# Patient Record
Sex: MALE | Race: BLACK OR AFRICAN AMERICAN | Employment: UNEMPLOYED | URBAN - METROPOLITAN AREA
[De-identification: names, ages, dates, MRNs, and addresses within clinical notes are randomized per-mention and may not be internally consistent; named-entity substitution may affect disease eponyms.]

---

## 2017-01-26 ENCOUNTER — OFFICE VISIT (OUTPATIENT)
Dept: RHEUMATOLOGY | Age: 52
End: 2017-01-26

## 2017-01-26 VITALS
OXYGEN SATURATION: 96 % | SYSTOLIC BLOOD PRESSURE: 146 MMHG | HEIGHT: 67 IN | BODY MASS INDEX: 42.72 KG/M2 | WEIGHT: 272.2 LBS | DIASTOLIC BLOOD PRESSURE: 96 MMHG | TEMPERATURE: 98 F | HEART RATE: 80 BPM

## 2017-01-26 DIAGNOSIS — M05.742 RHEUMATOID ARTHRITIS INVOLVING BOTH HANDS WITH POSITIVE RHEUMATOID FACTOR (HCC): Primary | ICD-10-CM

## 2017-01-26 DIAGNOSIS — M19.90 OSTEOARTH NOS-UNSPEC: ICD-10-CM

## 2017-01-26 DIAGNOSIS — M05.741 RHEUMATOID ARTHRITIS INVOLVING BOTH HANDS WITH POSITIVE RHEUMATOID FACTOR (HCC): Primary | ICD-10-CM

## 2017-01-26 RX ORDER — FOLIC ACID 1 MG/1
1 TABLET ORAL DAILY
Qty: 30 TAB | Refills: 11 | Status: SHIPPED | OUTPATIENT
Start: 2017-01-26 | End: 2017-02-25

## 2017-01-26 RX ORDER — METHOTREXATE 2.5 MG/1
15 TABLET ORAL
Qty: 24 TAB | Refills: 6 | Status: SHIPPED | OUTPATIENT
Start: 2017-01-29 | End: 2017-02-28

## 2017-01-26 NOTE — PROGRESS NOTES
RHEUMATOLOGY PROBLEM LIST AND CHIEF COMPLAINT  1. Rheumatoid Arthritis (2015) - Joint pain/swelling, synovitis in hands, wrists, elbows, rheumatoid nodules on elbows, morning stiffness. Elevated ESR, CCP, RF, CRP. Therapy History:  Prior NSAIDs:   Prior DMARDs:  Current NSAIDs:  Current DMARDs: MTX (Past-09/2016, 10/2016-current). Enbrel (11/2015-current)    2. Shoulder OA and rotator cuff disease    INTERVAL HISTORY  This is a 54 y.o.  male. Today, the patient complains of pain in the joints - left shoulder and hands  Severity:  4 on a scale of 0-10. Timing: all day   Context/Associated signs and symptoms: The patient states that his RA is doing well, but he complains of some lower back pain. He also complains that his hands shake when grasping items like a glass of water. He notes that the nodules on his left elbow persist. He also states that his right knee has been sore on occasion. The patient was given a steroid injection in his left shoulder last visit and states that his shoulder pain fully resolved about 1 week after his steroid injection. He continues on methotrexate 20 mg weekly and Enbrel weekly with no adverse effects. He is no longer on prednisone. RHEUMATOLOGY REVIEW OF SYSTEMS   Positives as per history  Negatives as follows:  Princess Spell:  Denies unexplained persistent fevers or weight change  RESPIRATORY:  No pleuritic pain, exertional dyspnea  CARDIOVASCULAR:  Denies chest pain  GASTRO:   Denies heartburn, abdominal pain, nausea, vomiting, diarrhea  SKIN:    Denies rash   MSK:    No morning stiffness >1 hour, persistent joint swelling, persistent joint pain    PAST MEDICAL HISTORY  Reviewed with patient, significant changes in medical history - no    PHYSICAL EXAM  Blood pressure (!) 146/96, pulse 80, temperature 98 °F (36.7 °C), temperature source Oral, height 5' 7\" (1.702 m), weight 272 lb 3.2 oz (123.5 kg), SpO2 96 %.   GENERAL APPEARANCE: Well-nourished, no acute distress  NECK: No adenopathy  ENT: No oral ulcers  CARDIOVASCULAR: Heart rhythm is regular. No murmur, rub, gallop  CHEST: Normal vesicular breath sounds. No wheezes, rales, pleural friction rubs  ABDOMINAL: The abdomen is soft and nontender. Bowel sounds are normal  SKIN: No rash, palpable purpura, digital ulcer, abnormal thickening   MUSCULOSKELETAL: Rotator cuff signs on the left. Upper extremities -  2 Nodules noted over left elbow  Lower extremities - full range of motion, no tenderness, no swelling, no synovial thickening and no deformity of joints     LABS, RADIOLOGY AND PROCEDURES   Previous labs reviewed -Yes    ASSESSMENT  1. Rheumatoid Arthritis (Established problem -  Very good partial response) - The patient restarted methotrexate and continues on Enbrel weekly. He is doing well and most of his pain today is from osteoarthritis. The nodules over his left elbow have persisted. I will reduce his methotrexate to 15 mg weekly, as methotrexate could be causing this. He should continue on Enbrel weekly along with his methotrexate and return in 4 months for a follow up. 2. Drug therapy monitoring for toxicity (methotrexate/Enbrel) - CBC, BUN, Cr, AST, ALT and albumin every 4 months  3. Osteoarthritis - The patient's back is causing him pain today. I have recommended seeing a back specialist and referred him to physical therapy. His left shoulder improved with his previous steroid injection. PLAN  1. Continue Enbrel  2. Decrease Methotrexate to 15 mg weekly  3. Check CBC, CMP, markers of inflammation (ESR, CRP)  4. Referral to physical therapy  5. Return in 4 months    Refugio Garcia MD, [address] [place] [address]   Adult and Pediatric Rheumatology     [address] [place] [address], Phone [phone], Fax [phone]     Visiting  of Pediatrics    Department of Pediatrics, [facility] [address] [address], [place], [address] [place] [address], Phone [phone], Fax [phone]    cc: Sangeeta Bray MD    Written by jeffery Rodas, as dictated by Rodrigo William.  Huang Funk M.D.

## 2017-01-26 NOTE — MR AVS SNAPSHOT
Visit Information Date & Time Provider Department Dept. Phone Encounter #  
 1/26/2017  1:20 PM Jj Whitfield MD [facility] [id number] Follow-up Instructions Return in about 4 months (around 5/26/2017). Your Appointments 2/16/2017  3:00 PM  
ROUTINE CARE with Sangeeta Bray MD  
DEPARTMENT [facility] OFFICE-[facility] ([address]) Appt Note: 6 [facility]  
 [address] [facility] [phone]  
[phone] [address] [facility] [address] Upcoming Health Maintenance Date Due INFLUENZA AGE 9 TO ADULT 8/1/2016 DTaP/Tdap/Td series (2 - Td) 6/1/2025 Allergies as of 1/26/2017  Review Complete On: 1/26/2017 By: Yessi Aquino LPN No Known Allergies Current Immunizations  Reviewed on 6/1/2015 Name Date Tdap 6/1/2015 Not reviewed this visit You Were Diagnosed With   
  
 Codes Comments Rheumatoid arthritis involving both hands with positive rheumatoid factor ([facility]    -  Primary ICD-10-CM: M05.741, W17.604 ICD-9-CM: 714.0 Osteoarth NOS-unspec     ICD-10-CM: M19.90 ICD-9-CM: 715.90 Vitals BP Pulse Temp Height(growth percentile) Weight(growth percentile) SpO2  
 (!) 146/96 (BP 1 Location: Left arm, BP Patient Position: Sitting) 80 98 °F (36.7 °C) (Oral) 5' 7\" (1.702 m) 272 lb 3.2 oz (123.5 kg) 96% BMI Smoking Status 42.63 kg/m2 Current Every Day Smoker Vitals History BMI and BSA Data Body Mass Index Body Surface Area  
 42.63 kg/m 2 2.42 m 2 Preferred Pharmacy Pharmacy Name Phone [facility] [address]. [phone] Your Updated Medication List  
  
   
This list is accurate as of: 1/26/17  1:48 PM.  Always use your most recent med list.  
  
  
  
  
 aspirin delayed-release 81 mg tablet Take 1 Tab by mouth daily. atenolol 25 mg tablet Commonly known as:  TENORMIN Take 0.5 Tabs by mouth nightly. * chlorthalidone 25 mg tablet Commonly known as:  HYGROTEN  
TAKE ONE TABLET BY MOUTH DAILY  Indications: HYPERTENSION * chlorthalidone 25 mg tablet Commonly known as:  HYGROTEN  
TAKE ONE TABLET BY MOUTH DAILY  
  
 etanercept 50 mg/mL (0.98 mL) injection Commonly known as:  ENBREL 50 mg by SubCUTAneous route every seven (7) days. folic acid 1 mg tablet Commonly known as:  Google Take 1 Tab by mouth daily for 30 days. methotrexate 2.5 mg tablet Commonly known as:  Denise Knee Take 6 Tabs by mouth every Sunday for 30 days. Start taking on:  1/29/2017  
  
 nystatin topical cream  
Commonly known as:  MYCOSTATIN Apply  to affected area two (2) times a day. * Notice: This list has 2 medication(s) that are the same as other medications prescribed for you. Read the directions carefully, and ask your doctor or other care provider to review them with you. Prescriptions Sent to Pharmacy Refills  
 methotrexate (RHEUMATREX) 2.5 mg tablet 6 Starting on: 1/29/2017 Sig: Take 6 Tabs by mouth every Sunday for 30 days. Class: Normal  
 Pharmacy: [address] Ph #: [phone] Route: Oral  
 folic acid (FOLVITE) 1 mg tablet 11 Sig: Take 1 Tab by mouth daily for 30 days. Class: Normal  
 Pharmacy: [address] Ph #: [phone] Route: Oral  
  
We Performed the Following C REACTIVE PROTEIN, QT [00699 CPT(R)] CBC+PLATELET+HEM REVIEW [94213 CPT(R)] METABOLIC PANEL, COMPREHENSIVE [59716 CPT(R)] REFERRAL TO PHYSICAL THERAPY [QUS64 Custom] Comments:  
 Osteoarthritis SED RATE (ESR) [id number] CPT(R)] Follow-up Instructions Return in about 4 months (around 5/26/2017). Referral Information Referral ID Referred By Referred To [id number] Zunilda GARCIA [facility], [address] [place] [address] Phone: [phone] Fax: [phone] Visits Status Start Date End Date 1 New Request 1/26/17 1/26/18 If your referral has a status of pending review or denied, additional information will be sent to support the outcome of this decision. Introducing [facility]! [address] introduces [employer] patient portal. Now you can access parts of your medical record, email your doctor's office, and request medication refills online. 1. In your internet browser, go to [email or web address] 2. Click on the First Time User? Click Here link in the Sign In box. You will see the New Member Sign Up page. 3. Enter your [employer] Access Code exactly as it appears below. You will not need to use this code after youve completed the sign-up process. If you do not sign up before the expiration date, you must request a new code. · [employer] Access Code: [email or web address] Expires: 4/26/2017  1:48 PM 
 
4. Enter the last four digits of your Social Security Number (xxxx) and Date of Birth (mm/dd/yyyy) as indicated and click Submit. You will be taken to the next sign-up page. 5. Create a [employer] ID. This will be your [employer] login ID and cannot be changed, so think of one that is secure and easy to remember. 6. Create a [employer] password. You can change your password at any time. 7. Enter your Password Reset Question and Answer. This can be used at a later time if you forget your password. 8. Enter your e-mail address. You will receive e-mail notification when new information is available in [address] 9. Click Sign Up. You can now view and download portions of your medical record. 10. Click the Download Summary menu link to download a portable copy of your medical information.  
 
If you have questions, please visit the Frequently Asked Questions section of the [employer] Remember, [employer] is NOT to be used for urgent needs. For medical emergencies, dial 911. Now available from your iPhone and Android! Please provide this summary of care documentation to your next provider. Your primary care clinician is listed as Jennifer Fowler. If you have any questions after today's visit, please call [phone].

## 2017-01-27 LAB
ALBUMIN SERPL-MCNC: 4.1 G/DL (ref 3.5–5.5)
ALBUMIN/GLOB SERPL: 1.4 {RATIO} (ref 1.1–2.5)
ALP SERPL-CCNC: 60 IU/L (ref 39–117)
ALT SERPL-CCNC: 25 IU/L (ref 0–44)
AST SERPL-CCNC: 25 IU/L (ref 0–40)
BASOPHILS # BLD MANUAL: 0.1 X10E3/UL (ref 0–0.2)
BASOPHILS NFR BLD MANUAL: 1 %
BILIRUB SERPL-MCNC: 0.7 MG/DL (ref 0–1.2)
BUN SERPL-MCNC: 10 MG/DL (ref 6–24)
BUN/CREAT SERPL: 10 (ref 9–20)
CALCIUM SERPL-MCNC: 9.2 MG/DL (ref 8.7–10.2)
CHLORIDE SERPL-SCNC: 99 MMOL/L (ref 96–106)
CO2 SERPL-SCNC: 23 MMOL/L (ref 18–29)
CREAT SERPL-MCNC: 0.96 MG/DL (ref 0.76–1.27)
CRP SERPL-MCNC: 10.9 MG/L (ref 0–4.9)
DIFFERENTIAL COMMENT, 115260: ABNORMAL
EOSINOPHIL # BLD MANUAL: 0.2 X10E3/UL (ref 0–0.4)
EOSINOPHIL NFR BLD MANUAL: 2 %
ERYTHROCYTE [DISTWIDTH] IN BLOOD BY AUTOMATED COUNT: 15.8 % (ref 12.3–15.4)
ERYTHROCYTE [SEDIMENTATION RATE] IN BLOOD BY WESTERGREN METHOD: 20 MM/HR (ref 0–15)
GLOBULIN SER CALC-MCNC: 3 G/DL (ref 1.5–4.5)
GLUCOSE SERPL-MCNC: 82 MG/DL (ref 65–99)
HCT VFR BLD AUTO: 41.8 % (ref 37.5–51)
HGB BLD-MCNC: 14.3 G/DL (ref 12.6–17.7)
LYMPHOCYTES # BLD MANUAL: 3.3 X10E3/UL (ref 0.7–3.1)
LYMPHOCYTES NFR BLD MANUAL: 38 %
MCH RBC QN AUTO: 28.5 PG (ref 26.6–33)
MCHC RBC AUTO-ENTMCNC: 34.2 G/DL (ref 31.5–35.7)
MCV RBC AUTO: 83 FL (ref 79–97)
MONOCYTES # BLD MANUAL: 0.9 X10E3/UL (ref 0.1–0.9)
MONOCYTES NFR BLD MANUAL: 10 %
NEUTROPHILS # BLD MANUAL: 4.3 X10E3/UL (ref 1.4–7)
NEUTROPHILS NFR BLD MANUAL: 49 %
PLATELET # BLD AUTO: 329 X10E3/UL (ref 150–379)
PLATELET BLD QL SMEAR: ADEQUATE
POTASSIUM SERPL-SCNC: 3.8 MMOL/L (ref 3.5–5.2)
PROT SERPL-MCNC: 7.1 G/DL (ref 6–8.5)
RBC # BLD AUTO: 5.02 X10E6/UL (ref 4.14–5.8)
RBC MORPH BLD: ABNORMAL
SODIUM SERPL-SCNC: 140 MMOL/L (ref 134–144)
WBC # BLD AUTO: 8.8 X10E3/UL (ref 3.4–10.8)

## 2017-02-02 RX ORDER — CHLORTHALIDONE 25 MG/1
TABLET ORAL
Qty: 30 TAB | Refills: 0 | Status: SHIPPED | OUTPATIENT
Start: 2017-02-02 | End: 2017-03-13 | Stop reason: SDUPTHER

## 2017-02-02 RX ORDER — HYDROXYCHLOROQUINE SULFATE 200 MG/1
TABLET, FILM COATED ORAL
Qty: 60 TAB | Refills: 0 | Status: SHIPPED | OUTPATIENT
Start: 2017-02-02 | End: 2017-03-30 | Stop reason: SDUPTHER

## 2017-02-09 ENCOUNTER — APPOINTMENT (OUTPATIENT)
Dept: PHYSICAL THERAPY | Age: 52
End: 2017-02-09

## 2017-02-16 RX ORDER — ETANERCEPT 50 MG/ML
SOLUTION SUBCUTANEOUS
Qty: 4 EACH | Refills: 5 | Status: SHIPPED | OUTPATIENT
Start: 2017-02-16 | End: 2017-09-12 | Stop reason: SDUPTHER

## 2017-03-13 ENCOUNTER — OFFICE VISIT (OUTPATIENT)
Dept: FAMILY MEDICINE CLINIC | Age: 52
End: 2017-03-13

## 2017-03-13 VITALS
HEIGHT: 67 IN | DIASTOLIC BLOOD PRESSURE: 90 MMHG | TEMPERATURE: 96.9 F | WEIGHT: 276.4 LBS | SYSTOLIC BLOOD PRESSURE: 145 MMHG | BODY MASS INDEX: 43.38 KG/M2 | RESPIRATION RATE: 14 BRPM | OXYGEN SATURATION: 95 % | HEART RATE: 77 BPM

## 2017-03-13 DIAGNOSIS — I10 HTN, GOAL BELOW 130/80: Primary | ICD-10-CM

## 2017-03-13 DIAGNOSIS — R73.03 PREDIABETES: ICD-10-CM

## 2017-03-13 DIAGNOSIS — G25.2 INTENTION TREMOR: ICD-10-CM

## 2017-03-13 DIAGNOSIS — Z72.0 TOBACCO ABUSE: ICD-10-CM

## 2017-03-13 RX ORDER — ATENOLOL 25 MG/1
25 TABLET ORAL
Qty: 30 TAB | Refills: 6 | Status: SHIPPED | OUTPATIENT
Start: 2017-03-13 | End: 2019-07-17 | Stop reason: ALTCHOICE

## 2017-03-13 RX ORDER — CHLORTHALIDONE 25 MG/1
TABLET ORAL
Qty: 30 TAB | Refills: 6 | Status: SHIPPED | OUTPATIENT
Start: 2017-03-13 | End: 2018-03-22 | Stop reason: SDUPTHER

## 2017-03-13 RX ORDER — ASPIRIN 81 MG/1
81 TABLET ORAL DAILY
Qty: 30 TAB | Refills: 11
Start: 2017-03-13 | End: 2022-01-21 | Stop reason: ALTCHOICE

## 2017-03-13 RX ORDER — METHOTREXATE 2.5 MG/1
25 TABLET ORAL
COMMUNITY
End: 2017-04-18

## 2017-03-13 NOTE — PATIENT INSTRUCTIONS
Learning About Benefits From Quitting Smoking  How does quitting smoking make you healthier? If you're thinking about quitting smoking, you may have a few reasons to be smoke-free. Your health may be one of them. · When you quit smoking, you lower your risks for cancer, lung disease, heart attack, stroke, blood vessel disease, and blindness from macular degeneration. · When you're smoke-free, you get sick less often, and you heal faster. You are less likely to get colds, flu, bronchitis, and pneumonia. · As a nonsmoker, you may find that your mood is better and you are less stressed. When and how will you feel healthier? Quitting has real health benefits that start from day 1 of being smoke-free. And the longer you stay smoke-free, the healthier you get and the better you feel. The first hours  · After just 20 minutes, your blood pressure and heart rate go down. That means there's less stress on your heart and blood vessels. · Within 12 hours, the level of carbon monoxide in your blood drops back to normal. That makes room for more oxygen. With more oxygen in your body, you may notice that you have more energy than when you smoked. After 2 weeks  · Your lungs start to work better. · Your risk of heart attack starts to drop. After 1 month  · When your lungs are clear, you cough less and breathe deeper, so it's easier to be active. · Your sense of taste and smell return. That means you can enjoy food more than you have since you started smoking. Over the years  · After 1 year, your risk of heart disease is half what it would be if you kept smoking. · After 5 years, your risk of stroke starts to shrink. Within a few years after that, it's about the same as if you'd never smoked. · After 10 years, your risk of dying from lung cancer is cut by about half. And your risk for many other types of cancer is lower too. How would quitting help others in your life?   When you quit smoking, you improve the health of everyone who now breathes in your smoke. · Their heart, lung, and cancer risks drop, much like yours. · They are sick less. For babies and small children, living smoke-free means they're less likely to have ear infections, pneumonia, and bronchitis. · If you're a woman who is or will be pregnant someday, quitting smoking means a healthier . · Children who are close to you are less likely to become adult smokers. Where can you learn more? Go to http://jada-moreno.info/. Enter 052 [id number] 72 11 in the search box to learn more about \"Learning About Benefits From Quitting Smoking. \"  Current as of: May 26, 2016  Content Version: 11.1  © 2020-4661 [employer]. Care instructions adapted under license by [employer] (which disclaims liability or warranty for this information). If you have questions about a medical condition or this instruction, always ask your healthcare professional. [place] [address] any warranty or liability for your use of this information. Learning About Benefits From Quitting Smoking  How does quitting smoking make you healthier? If you're thinking about quitting smoking, you may have a few reasons to be smoke-free. Your health may be one of them. · When you quit smoking, you lower your risks for cancer, lung disease, heart attack, stroke, blood vessel disease, and blindness from macular degeneration. · When you're smoke-free, you get sick less often, and you heal faster. You are less likely to get colds, flu, bronchitis, and pneumonia. · As a nonsmoker, you may find that your mood is better and you are less stressed. When and how will you feel healthier? Quitting has real health benefits that start from day 1 of being smoke-free. And the longer you stay smoke-free, the healthier you get and the better you feel. The first hours  · After just 20 minutes, your blood pressure and heart rate go down.  That means there's less stress on your heart and blood vessels. · Within 12 hours, the level of carbon monoxide in your blood drops back to normal. That makes room for more oxygen. With more oxygen in your body, you may notice that you have more energy than when you smoked. After 2 weeks  · Your lungs start to work better. · Your risk of heart attack starts to drop. After 1 month  · When your lungs are clear, you cough less and breathe deeper, so it's easier to be active. · Your sense of taste and smell return. That means you can enjoy food more than you have since you started smoking. Over the years  · After 1 year, your risk of heart disease is half what it would be if you kept smoking. · After 5 years, your risk of stroke starts to shrink. Within a few years after that, it's about the same as if you'd never smoked. · After 10 years, your risk of dying from lung cancer is cut by about half. And your risk for many other types of cancer is lower too. How would quitting help others in your life? When you quit smoking, you improve the health of everyone who now breathes in your smoke. · Their heart, lung, and cancer risks drop, much like yours. · They are sick less. For babies and small children, living smoke-free means they're less likely to have ear infections, pneumonia, and bronchitis. · If you're a woman who is or will be pregnant someday, quitting smoking means a healthier . · Children who are close to you are less likely to become adult smokers. Where can you learn more? Go to http://jada-moreno.info/. Enter 052 [id number] 72 11 in the search box to learn more about \"Learning About Benefits From Quitting Smoking. \"  Current as of: May 26, 2016  Content Version: 11.1  © 0851-0790 [employer]. Care instructions adapted under license by [employer] (which disclaims liability or warranty for this information).  If you have questions about a medical condition or this instruction, always ask your healthcare professional. [place] [address] any warranty or liability for your use of this information. DASH Diet: Care Instructions  Your Care Instructions  The DASH diet is an eating plan that can help lower your blood pressure. DASH stands for Dietary Approaches to Stop Hypertension. Hypertension is high blood pressure. The DASH diet focuses on eating foods that are high in calcium, potassium, and magnesium. These nutrients can lower blood pressure. The foods that are highest in these nutrients are fruits, vegetables, low-fat dairy products, nuts, seeds, and legumes. But taking calcium, potassium, and magnesium supplements instead of eating foods that are high in those nutrients does not have the same effect. The DASH diet also includes whole grains, fish, and poultry. The DASH diet is one of several lifestyle changes your doctor may recommend to lower your high blood pressure. Your doctor may also want you to decrease the amount of sodium in your diet. Lowering sodium while following the DASH diet can lower blood pressure even further than just the DASH diet alone. Follow-up care is a key part of your treatment and safety. Be sure to make and go to all appointments, and call your doctor if you are having problems. It's also a good idea to know your test results and keep a list of the medicines you take. How can you care for yourself at home? Following the DASH diet  · Eat 4 to 5 servings of fruit each day. A serving is 1 medium-sized piece of fruit, ½ cup chopped or canned fruit, 1/4 cup dried fruit, or 4 ounces (½ cup) of fruit juice. Choose fruit more often than fruit juice. · Eat 4 to 5 servings of vegetables each day. A serving is 1 cup of lettuce or raw leafy vegetables, ½ cup of chopped or cooked vegetables, or 4 ounces (½ cup) of vegetable juice. Choose vegetables more often than vegetable juice. · Get 2 to 3 servings of low-fat and fat-free dairy each day.  A serving is 8 ounces of milk, 1 cup of yogurt, or 1 ½ ounces of cheese. · Eat 6 to 8 servings of grains each day. A serving is 1 slice of bread, 1 ounce of dry cereal, or ½ cup of cooked rice, pasta, or cooked cereal. Try to choose whole-grain products as much as possible. · Limit lean meat, poultry, and fish to 2 servings each day. A serving is 3 ounces, about the size of a deck of cards. · Eat 4 to 5 servings of nuts, seeds, and legumes (cooked dried beans, lentils, and split peas) each week. A serving is 1/3 cup of nuts, 2 tablespoons of seeds, or ½ cup of cooked beans or peas. · Limit fats and oils to 2 to 3 servings each day. A serving is 1 teaspoon of vegetable oil or 2 tablespoons of salad dressing. · Limit sweets and added sugars to 5 servings or less a week. A serving is 1 tablespoon jelly or jam, ½ cup sorbet, or 1 cup of lemonade. · Eat less than 2,300 milligrams (mg) of sodium a day. If you limit your sodium to 1,500 mg a day, you can lower your blood pressure even more. Tips for success  · Start small. Do not try to make dramatic changes to your diet all at once. You might feel that you are missing out on your favorite foods and then be more likely to not follow the plan. Make small changes, and stick with them. Once those changes become habit, add a few more changes. · Try some of the following:  ¨ Make it a goal to eat a fruit or vegetable at every meal and at snacks. This will make it easy to get the recommended amount of fruits and vegetables each day. ¨ Try yogurt topped with fruit and nuts for a snack or healthy dessert. ¨ Add lettuce, tomato, cucumber, and onion to sandwiches. ¨ Combine a ready-made pizza crust with low-fat mozzarella cheese and lots of vegetable toppings. Try using tomatoes, squash, spinach, broccoli, carrots, cauliflower, and onions. ¨ Have a variety of cut-up vegetables with a low-fat dip as an appetizer instead of chips and dip.   ¨ Sprinkle sunflower seeds or chopped almonds over salads. Or try adding chopped walnuts or almonds to cooked vegetables. ¨ Try some vegetarian meals using beans and peas. Add garbanzo or kidney beans to salads. Make burritos and tacos with mashed whyte beans or black beans. Where can you learn more? Go to http://jada-moreno.info/. Enter Z657 in the search box to learn more about \"DASH Diet: Care Instructions. \"  Current as of: March 23, 2016  Content Version: 11.1  © 2216-5630 [employer]. Care instructions adapted under license by [employer] (which disclaims liability or warranty for this information). If you have questions about a medical condition or this instruction, always ask your healthcare professional. [address] any warranty or liability for your use of this information. High Blood Pressure: Care Instructions  Your Care Instructions  If your blood pressure is usually above 140/90, you have high blood pressure, or hypertension. That means the top number is 140 or higher or the bottom number is 90 or higher, or both. Despite what a lot of people think, high blood pressure usually doesn't cause headaches or make you feel dizzy or lightheaded. It usually has no symptoms. But it does increase your risk for heart attack, stroke, and kidney or eye damage. The higher your blood pressure, the more your risk increases. Your doctor will give you a goal for your blood pressure. Your goal will be based on your health and your age. An example of a goal is to keep your blood pressure below 140/90. Lifestyle changes, such as eating healthy and being active, are always important to help lower blood pressure. You might also take medicine to reach your blood pressure goal.  Follow-up care is a key part of your treatment and safety. Be sure to make and go to all appointments, and call your doctor if you are having problems.  It's also a good idea to know your test results and keep a list of the medicines you take. How can you care for yourself at home? Medical treatment  · If you stop taking your medicine, your blood pressure will go back up. You may take one or more types of medicine to lower your blood pressure. Be safe with medicines. Take your medicine exactly as prescribed. Call your doctor if you think you are having a problem with your medicine. · Talk to your doctor before you start taking aspirin every day. Aspirin can help certain people lower their risk of a heart attack or stroke. But taking aspirin isn't right for everyone, because it can cause serious bleeding. · See your doctor regularly. You may need to see the doctor more often at first or until your blood pressure comes down. · If you are taking blood pressure medicine, talk to your doctor before you take decongestants or anti-inflammatory medicine, such as ibuprofen. Some of these medicines can raise blood pressure. · Learn how to check your blood pressure at home. Lifestyle changes  · Stay at a healthy weight. This is especially important if you put on weight around the waist. Losing even 10 pounds can help you lower your blood pressure. · If your doctor recommends it, get more exercise. Walking is a good choice. Bit by bit, increase the amount you walk every day. Try for at least 30 minutes on most days of the week. You also may want to swim, bike, or do other activities. · Avoid or limit alcohol. Talk to your doctor about whether you can drink any alcohol. · Try to limit how much sodium you eat to less than 2,300 milligrams (mg) a day. Your doctor may ask you to try to eat less than 1,500 mg a day. · Eat plenty of fruits (such as bananas and oranges), vegetables, legumes, whole grains, and low-fat dairy products. · Lower the amount of saturated fat in your diet. Saturated fat is found in animal products such as milk, cheese, and meat.  Limiting these foods may help you lose weight and also lower your risk for heart disease. · Do not smoke. Smoking increases your risk for heart attack and stroke. If you need help quitting, talk to your doctor about stop-smoking programs and medicines. These can increase your chances of quitting for good. When should you call for help? Call 911 anytime you think you may need emergency care. This may mean having symptoms that suggest that your blood pressure is causing a serious heart or blood vessel problem. Your blood pressure may be over 180/110. For example, call 911 if:  · You have symptoms of a heart attack. These may include:  ¨ Chest pain or pressure, or a strange feeling in the chest.  ¨ Sweating. ¨ Shortness of breath. ¨ Nausea or vomiting. ¨ Pain, pressure, or a strange feeling in the back, neck, jaw, or upper belly or in one or both shoulders or arms. ¨ Lightheadedness or sudden weakness. ¨ A fast or irregular heartbeat. · You have symptoms of a stroke. These may include:  ¨ Sudden numbness, tingling, weakness, or loss of movement in your face, arm, or leg, especially on only one side of your body. ¨ Sudden vision changes. ¨ Sudden trouble speaking. ¨ Sudden confusion or trouble understanding simple statements. ¨ Sudden problems with walking or balance. ¨ A sudden, severe headache that is different from past headaches. · You have severe back or belly pain. Do not wait until your blood pressure comes down on its own. Get help right away. Call your doctor now or seek immediate care if:  · Your blood pressure is much higher than normal (such as 180/110 or higher), but you don't have symptoms. · You think high blood pressure is causing symptoms, such as:  ¨ Severe headache. ¨ Blurry vision. Watch closely for changes in your health, and be sure to contact your doctor if:  · Your blood pressure measures 140/90 or higher at least 2 times. That means the top number is 140 or higher or the bottom number is 90 or higher, or both.   · You think you may be having side effects from your blood pressure medicine. · Your blood pressure is usually normal, but it goes above normal at least 2 times. Where can you learn more? Go to http://jada-moreno.info/. Enter Y166 in the search box to learn more about \"High Blood Pressure: Care Instructions. \"  Current as of: August 8, 2016  Content Version: 11.1  © 5577-4369 [employer]. Care instructions adapted under license by [employer] (which disclaims liability or warranty for this information). If you have questions about a medical condition or this instruction, always ask your healthcare professional. [place] [address] any warranty or liability for your use of this information.

## 2017-03-13 NOTE — PROGRESS NOTES
HISTORY OF PRESENT ILLNESS  Linzie Barthel is a 54 y.o. male. HPI       HTN  Today pt present for Bp check and the patient stating that so far there has been a Compliancy w/ the bp meds, he is having no low salt diet, hx of intentional tremor was givne B blocker did not get it filled requesting a med for it, mainly on activities, no resting no hx of Parkinsonism,  And likes his smoked meats,   the patient has been active life style and does do the daily walking,   pt states that patien does not obtain the bp at home  , today the pt denies Chest Pain, has no legs swelling no lightheadedness,  otherwise feeling better since the last visit      Current Outpatient Prescriptions   Medication Sig Dispense Refill    methotrexate (RHEUMATREX) 2.5 mg tablet Take 25 mg by mouth every Sunday.  ENBREL SURECLICK 50 mg/mL (9.56 mL) injection Inject 50 mg under the skin every week 4 Each 5    hydroxychloroquine (PLAQUENIL) 200 mg tablet TAKE ONE TABLET BY MOUTH TWICE A DAY 60 Tab 0    chlorthalidone (HYGROTEN) 25 mg tablet TAKE ONE TABLET BY MOUTH DAILY  Indications: HYPERTENSION 30 Tab 4    aspirin delayed-release 81 mg tablet Take 1 Tab by mouth daily. 30 Tab 11    nystatin (MYCOSTATIN) topical cream Apply  to affected area two (2) times a day. 30 g 5     No Known Allergies  Past Medical History:   Diagnosis Date    Cellulitis and abscess of trunk 7/31/2014    Chronic back pain greater than 3 months duration 7/31/2014    Family history of prostate cancer 7/31/2014    Hyperhydrosis disorder 7/31/2014    Hypertension     Intention tremor 12/9/2015    Prediabetes 6/25/2014    RA (rheumatoid arthritis) ([facility] [address] 7/31/2014    Rash of perineum 6/25/2014    Tinea manus 1/19/2016    Tinea pedis of both feet 1/19/2016    Tobacco abuse 6/25/2014     Past Surgical History:   Procedure Laterality Date    HX ORTHOPAEDIC      right leg and knee     History reviewed. No pertinent family history.   Social History Substance Use Topics    Smoking status: Current Every Day Smoker     Packs/day: 1.00    Smokeless tobacco: Never Used    Alcohol use Yes      Comment: occasionally      Lab Results  Component Value Date/Time   Hemoglobin A1c 5.5 01/19/2016 12:26 PM   Hemoglobin A1c 5.7 06/25/2014 12:19 PM   Glucose 82 01/26/2017 01:56 PM   Creatinine 0.96 01/26/2017 01:56 PM      Lab Results  Component Value Date/Time   GFR est  01/26/2017 01:56 PM   GFR est non-AA 92 01/26/2017 01:56 PM   Creatinine 0.96 01/26/2017 01:56 PM   BUN 10 01/26/2017 01:56 PM   Sodium 140 01/26/2017 01:56 PM   Potassium 3.8 01/26/2017 01:56 PM   Chloride 99 01/26/2017 01:56 PM   CO2 23 01/26/2017 01:56 PM      Lab Results  Component Value Date/Time   TSH 1.700 08/10/2016 04:40 PM         Review of Systems   Constitutional: Negative for chills and fever. HENT: Negative for ear pain and nosebleeds. Eyes: Negative for blurred vision, pain and discharge. Respiratory: Negative for shortness of breath. Cardiovascular: Negative for chest pain and leg swelling. Gastrointestinal: Negative for constipation, diarrhea, nausea and vomiting. Genitourinary: Negative for frequency. Musculoskeletal: Negative for joint pain. Skin: Negative for itching and rash. Neurological: Negative for headaches. Psychiatric/Behavioral: Negative for depression. The patient is not nervous/anxious. Physical Exam   Constitutional: He is oriented to person, place, and time. He appears well-developed and well-nourished. HENT:   Head: Normocephalic and atraumatic. Mouth/Throat: No oropharyngeal exudate. Eyes: Conjunctivae and EOM are normal.   Neck: Normal range of motion. Neck supple. Cardiovascular: Normal rate, regular rhythm and normal heart sounds. No murmur heard. Pulmonary/Chest: Effort normal and breath sounds normal. No respiratory distress. Abdominal: Soft. Bowel sounds are normal. He exhibits no distension. There is no rebound. Musculoskeletal: He exhibits no edema or tenderness. Neurological: He is alert and oriented to person, place, and time. Skin: Skin is warm. No erythema. Psychiatric: He has a normal mood and affect. His behavior is normal.   Nursing note and vitals reviewed. ASSESSMENT and PLAN  Christo Ríos was seen today for hypertension. Diagnoses and all orders for this visit:    HTN, goal below 130/80  -     chlorthalidone (HYGROTEN) 25 mg tablet; TAKE ONE TABLET BY MOUTH DAILY  Indications: hypertension  -     aspirin delayed-release 81 mg tablet; Take 1 Tab by mouth daily. -     atenolol (TENORMIN) 25 mg tablet; Take 1 Tab by mouth nightly. Indications: intentional tremor    Prediabetes  -     chlorthalidone (HYGROTEN) 25 mg tablet; TAKE ONE TABLET BY MOUTH DAILY  Indications: hypertension  -     aspirin delayed-release 81 mg tablet; Take 1 Tab by mouth daily. -     atenolol (TENORMIN) 25 mg tablet; Take 1 Tab by mouth nightly. Indications: intentional tremor    Tobacco abuse  -     chlorthalidone (HYGROTEN) 25 mg tablet; TAKE ONE TABLET BY MOUTH DAILY  Indications: hypertension  -     aspirin delayed-release 81 mg tablet; Take 1 Tab by mouth daily. -     atenolol (TENORMIN) 25 mg tablet; Take 1 Tab by mouth nightly. Indications: intentional tremor    Intention tremor  -     chlorthalidone (HYGROTEN) 25 mg tablet; TAKE ONE TABLET BY MOUTH DAILY  Indications: hypertension  -     aspirin delayed-release 81 mg tablet; Take 1 Tab by mouth daily. -     atenolol (TENORMIN) 25 mg tablet; Take 1 Tab by mouth nightly.  Indications: intentional tremor      HTN controlled, no change of bp meds at this time,  Discussed sodium restriction, high k rich diet,  maintaining ideal body weight and regular exercise program such as daily walking 30 min perday 4-5 times per week,  medication compliance advised, was told to call back for rfs or of any concern

## 2017-03-13 NOTE — MR AVS SNAPSHOT
Visit Information Date & Time Provider Department Dept. Phone Encounter #  
 3/13/2017 11:15 AM Sangeeta Bray MD [address] [facility] OFFICE-ANNEX [phone] [id number] Follow-up Instructions Return in about 6 months (around 9/13/2017), or if symptoms worsen or fail to improve. Follow-up and Disposition History Your Appointments 4/24/2017  1:40 PM  
ESTABLISHED PATIENT with Jj Whitfield MD  
Arthritis and [facility] ([address] [place] [address]) Appt Note: 4 MO F/U  
 [address] [facility] [address]  
[phone]  
  
   
 [address] [address] Upcoming Health Maintenance Date Due INFLUENZA AGE 9 TO ADULT 8/1/2016 DTaP/Tdap/Td series (2 - Td) 6/1/2025 Allergies as of 3/13/2017  Review Complete On: 3/13/2017 By: Waqas Goldberg LPN No Known Allergies Current Immunizations  Reviewed on 6/1/2015 Name Date Tdap 6/1/2015 Not reviewed this visit You Were Diagnosed With   
  
 Codes Comments HTN, goal below 130/80    -  Primary ICD-10-CM: I10 
ICD-9-CM: 401.9 Prediabetes     ICD-10-CM: R73.03 
ICD-9-CM: 790.29 Tobacco abuse     ICD-10-CM: Z72.0 ICD-9-CM: 305.1 Intention tremor     ICD-10-CM: G25.2 ICD-9-CM: 333.1 Vitals BP Pulse Temp Resp Height(growth percentile) Weight(growth percentile) 145/90 77 96.9 °F (36.1 °C) (Oral) 14 5' 7\" (1.702 m) 276 lb 6.4 oz (125.4 kg) SpO2 BMI Smoking Status 95% 43.29 kg/m2 Current Every Day Smoker Vitals History BMI and BSA Data Body Mass Index Body Surface Area  
 43.29 kg/m 2 2.43 m 2 Preferred Pharmacy Pharmacy Name Phone [facility] [address] [place] [address] Payton Hagan [phone] Your Updated Medication List  
  
   
This list is accurate as of: 3/13/17 12:35 PM.  Always use your most recent med list.  
  
  
  
  
 aspirin delayed-release 81 mg tablet Take 1 Tab by mouth daily. atenolol 25 mg tablet Commonly known as:  TENORMIN Take 1 Tab by mouth nightly. Indications: intentional tremor  
  
 chlorthalidone 25 mg tablet Commonly known as:  HYGROTEN  
TAKE ONE TABLET BY MOUTH DAILY  Indications: hypertension ENBREL SURECLICK 50 mg/mL (3.31 mL) injection Generic drug:  etanercept Inject 50 mg under the skin every week  
  
 hydroxychloroquine 200 mg tablet Commonly known as:  PLAQUENIL  
TAKE ONE TABLET BY MOUTH TWICE A DAY  
  
 methotrexate 2.5 mg tablet Commonly known as:  Lela Schimke Take 25 mg by mouth every Sunday. nystatin topical cream  
Commonly known as:  MYCOSTATIN Apply  to affected area two (2) times a day. Prescriptions Sent to Pharmacy Refills  
 chlorthalidone (HYGROTEN) 25 mg tablet 6 Sig: TAKE ONE TABLET BY MOUTH DAILY  Indications: hypertension Class: Normal  
 Pharmacy: [address] Ph #: [phone]  
 atenolol (TENORMIN) 25 mg tablet 6 Sig: Take 1 Tab by mouth nightly. Indications: intentional tremor Class: Normal  
 Pharmacy: [address]  [address] Ph #: [phone] Route: Oral  
  
Follow-up Instructions Return in about 6 months (around 9/13/2017), or if symptoms worsen or fail to improve. Patient Instructions Learning About Benefits From Quitting Smoking How does quitting smoking make you healthier? If you're thinking about quitting smoking, you may have a few reasons to be smoke-free. Your health may be one of them. · When you quit smoking, you lower your risks for cancer, lung disease, heart attack, stroke, blood vessel disease, and blindness from macular degeneration. · When you're smoke-free, you get sick less often, and you heal faster. You are less likely to get colds, flu, bronchitis, and pneumonia. · As a nonsmoker, you may find that your mood is better and you are less stressed. When and how will you feel healthier? Quitting has real health benefits that start from day 1 of being smoke-free. And the longer you stay smoke-free, the healthier you get and the better you feel. The first hours · After just 20 minutes, your blood pressure and heart rate go down. That means there's less stress on your heart and blood vessels. · Within 12 hours, the level of carbon monoxide in your blood drops back to normal. That makes room for more oxygen. With more oxygen in your body, you may notice that you have more energy than when you smoked. After 2 weeks · Your lungs start to work better. · Your risk of heart attack starts to drop. After 1 month · When your lungs are clear, you cough less and breathe deeper, so it's easier to be active. · Your sense of taste and smell return. That means you can enjoy food more than you have since you started smoking. Over the years · After 1 year, your risk of heart disease is half what it would be if you kept smoking. · After 5 years, your risk of stroke starts to shrink. Within a few years after that, it's about the same as if you'd never smoked. · After 10 years, your risk of dying from lung cancer is cut by about half. And your risk for many other types of cancer is lower too. How would quitting help others in your life? When you quit smoking, you improve the health of everyone who now breathes in your smoke. · Their heart, lung, and cancer risks drop, much like yours. · They are sick less. For babies and small children, living smoke-free means they're less likely to have ear infections, pneumonia, and bronchitis. · If you're a woman who is or will be pregnant someday, quitting smoking means a healthier . · Children who are close to you are less likely to become adult smokers. Where can you learn more? Go to http://jada-moreno.info/. Enter 052 [id number] [id number] in the search box to learn more about \"Learning About Benefits From Quitting Smoking. \" Current as of: May 26, 2016 Content Version: 11.1 © 8515-4771 [employer]. Care instructions adapted under license by [employer] (which disclaims liability or warranty for this information). If you have questions about a medical condition or this instruction, always ask your healthcare professional. [place] [address] any warranty or liability for your use of this information. Learning About Benefits From Quitting Smoking How does quitting smoking make you healthier? If you're thinking about quitting smoking, you may have a few reasons to be smoke-free. Your health may be one of them. · When you quit smoking, you lower your risks for cancer, lung disease, heart attack, stroke, blood vessel disease, and blindness from macular degeneration. · When you're smoke-free, you get sick less often, and you heal faster. You are less likely to get colds, flu, bronchitis, and pneumonia. · As a nonsmoker, you may find that your mood is better and you are less stressed. When and how will you feel healthier? Quitting has real health benefits that start from day 1 of being smoke-free. And the longer you stay smoke-free, the healthier you get and the better you feel. The first hours · After just 20 minutes, your blood pressure and heart rate go down. That means there's less stress on your heart and blood vessels. · Within 12 hours, the level of carbon monoxide in your blood drops back to normal. That makes room for more oxygen. With more oxygen in your body, you may notice that you have more energy than when you smoked. After 2 weeks · Your lungs start to work better. · Your risk of heart attack starts to drop. After 1 month · When your lungs are clear, you cough less and breathe deeper, so it's easier to be active. · Your sense of taste and smell return. That means you can enjoy food more than you have since you started smoking. Over the years · After 1 year, your risk of heart disease is half what it would be if you kept smoking. · After 5 years, your risk of stroke starts to shrink. Within a few years after that, it's about the same as if you'd never smoked. · After 10 years, your risk of dying from lung cancer is cut by about half. And your risk for many other types of cancer is lower too. How would quitting help others in your life? When you quit smoking, you improve the health of everyone who now breathes in your smoke. · Their heart, lung, and cancer risks drop, much like yours. · They are sick less. For babies and small children, living smoke-free means they're less likely to have ear infections, pneumonia, and bronchitis. · If you're a woman who is or will be pregnant someday, quitting smoking means a healthier . · Children who are close to you are less likely to become adult smokers. Where can you learn more? Go to http://jadaBetterymoreno.info/. Enter 052 [id number] [id number] 11 in the search box to learn more about \"Learning About Benefits From Quitting Smoking. \" Current as of: May 26, 2016 Content Version: 11.1 © 7845-2936 [employer]. Care instructions adapted under license by [employer] (which disclaims liability or warranty for this information). If you have questions about a medical condition or this instruction, always ask your healthcare professional. [place] [address] any warranty or liability for your use of this information. DASH Diet: Care Instructions Your Care Instructions The DASH diet is an eating plan that can help lower your blood pressure. DASH stands for Dietary Approaches to Stop Hypertension. Hypertension is high blood pressure.  
The DASH diet focuses on eating foods that are high in calcium, potassium, and magnesium. These nutrients can lower blood pressure. The foods that are highest in these nutrients are fruits, vegetables, low-fat dairy products, nuts, seeds, and legumes. But taking calcium, potassium, and magnesium supplements instead of eating foods that are high in those nutrients does not have the same effect. The DASH diet also includes whole grains, fish, and poultry. The DASH diet is one of several lifestyle changes your doctor may recommend to lower your high blood pressure. Your doctor may also want you to decrease the amount of sodium in your diet. Lowering sodium while following the DASH diet can lower blood pressure even further than just the DASH diet alone. Follow-up care is a key part of your treatment and safety. Be sure to make and go to all appointments, and call your doctor if you are having problems. It's also a good idea to know your test results and keep a list of the medicines you take. How can you care for yourself at home? Following the DASH diet · Eat 4 to 5 servings of fruit each day. A serving is 1 medium-sized piece of fruit, ½ cup chopped or canned fruit, 1/4 cup dried fruit, or 4 ounces (½ cup) of fruit juice. Choose fruit more often than fruit juice. · Eat 4 to 5 servings of vegetables each day. A serving is 1 cup of lettuce or raw leafy vegetables, ½ cup of chopped or cooked vegetables, or 4 ounces (½ cup) of vegetable juice. Choose vegetables more often than vegetable juice. · Get 2 to 3 servings of low-fat and fat-free dairy each day. A serving is 8 ounces of milk, 1 cup of yogurt, or 1 ½ ounces of cheese. · Eat 6 to 8 servings of grains each day. A serving is 1 slice of bread, 1 ounce of dry cereal, or ½ cup of cooked rice, pasta, or cooked cereal. Try to choose whole-grain products as much as possible. · Limit lean meat, poultry, and fish to 2 servings each day. A serving is 3 ounces, about the size of a deck of cards. · Eat 4 to 5 servings of nuts, seeds, and legumes (cooked dried beans, lentils, and split peas) each week. A serving is 1/3 cup of nuts, 2 tablespoons of seeds, or ½ cup of cooked beans or peas. · Limit fats and oils to 2 to 3 servings each day. A serving is 1 teaspoon of vegetable oil or 2 tablespoons of salad dressing. · Limit sweets and added sugars to 5 servings or less a week. A serving is 1 tablespoon jelly or jam, ½ cup sorbet, or 1 cup of lemonade. · Eat less than 2,300 milligrams (mg) of sodium a day. If you limit your sodium to 1,500 mg a day, you can lower your blood pressure even more. Tips for success · Start small. Do not try to make dramatic changes to your diet all at once. You might feel that you are missing out on your favorite foods and then be more likely to not follow the plan. Make small changes, and stick with them. Once those changes become habit, add a few more changes. · Try some of the following: ¨ Make it a goal to eat a fruit or vegetable at every meal and at snacks. This will make it easy to get the recommended amount of fruits and vegetables each day. ¨ Try yogurt topped with fruit and nuts for a snack or healthy dessert. ¨ Add lettuce, tomato, cucumber, and onion to sandwiches. ¨ Combine a ready-made pizza crust with low-fat mozzarella cheese and lots of vegetable toppings. Try using tomatoes, squash, spinach, broccoli, carrots, cauliflower, and onions. ¨ Have a variety of cut-up vegetables with a low-fat dip as an appetizer instead of chips and dip. ¨ Sprinkle sunflower seeds or chopped almonds over salads. Or try adding chopped walnuts or almonds to cooked vegetables. ¨ Try some vegetarian meals using beans and peas. Add garbanzo or kidney beans to salads. Make burritos and tacos with mashed whyte beans or black beans. Where can you learn more? Go to http://jada-moreno.info/.  
Enter R582 in the search box to learn more about \"DASH Diet: Care Instructions. \" Current as of: March 23, 2016 Content Version: 11.1 © 6382-2457 [employer]. Care instructions adapted under license by [employer] (which disclaims liability or warranty for this information). If you have questions about a medical condition or this instruction, always ask your healthcare professional. [address] any warranty or liability for your use of this information. High Blood Pressure: Care Instructions Your Care Instructions If your blood pressure is usually above 140/90, you have high blood pressure, or hypertension. That means the top number is 140 or higher or the bottom number is 90 or higher, or both. Despite what a lot of people think, high blood pressure usually doesn't cause headaches or make you feel dizzy or lightheaded. It usually has no symptoms. But it does increase your risk for heart attack, stroke, and kidney or eye damage. The higher your blood pressure, the more your risk increases. Your doctor will give you a goal for your blood pressure. Your goal will be based on your health and your age. An example of a goal is to keep your blood pressure below 140/90. Lifestyle changes, such as eating healthy and being active, are always important to help lower blood pressure. You might also take medicine to reach your blood pressure goal. 
Follow-up care is a key part of your treatment and safety. Be sure to make and go to all appointments, and call your doctor if you are having problems. It's also a good idea to know your test results and keep a list of the medicines you take. How can you care for yourself at home? Medical treatment · If you stop taking your medicine, your blood pressure will go back up. You may take one or more types of medicine to lower your blood pressure. Be safe with medicines. Take your medicine exactly as prescribed. Call your doctor if you think you are having a problem with your medicine. · Talk to your doctor before you start taking aspirin every day. Aspirin can help certain people lower their risk of a heart attack or stroke. But taking aspirin isn't right for everyone, because it can cause serious bleeding. · See your doctor regularly. You may need to see the doctor more often at first or until your blood pressure comes down. · If you are taking blood pressure medicine, talk to your doctor before you take decongestants or anti-inflammatory medicine, such as ibuprofen. Some of these medicines can raise blood pressure. · Learn how to check your blood pressure at home. Lifestyle changes · Stay at a healthy weight. This is especially important if you put on weight around the waist. Losing even 10 pounds can help you lower your blood pressure. · If your doctor recommends it, get more exercise. Walking is a good choice. Bit by bit, increase the amount you walk every day. Try for at least 30 minutes on most days of the week. You also may want to swim, bike, or do other activities. · Avoid or limit alcohol. Talk to your doctor about whether you can drink any alcohol. · Try to limit how much sodium you eat to less than 2,300 milligrams (mg) a day. Your doctor may ask you to try to eat less than 1,500 mg a day. · Eat plenty of fruits (such as bananas and oranges), vegetables, legumes, whole grains, and low-fat dairy products. · Lower the amount of saturated fat in your diet. Saturated fat is found in animal products such as milk, cheese, and meat. Limiting these foods may help you lose weight and also lower your risk for heart disease. · Do not smoke. Smoking increases your risk for heart attack and stroke. If you need help quitting, talk to your doctor about stop-smoking programs and medicines. These can increase your chances of quitting for good. When should you call for help? Call 911 anytime you think you may need emergency care.  This may mean having symptoms that suggest that your blood pressure is causing a serious heart or blood vessel problem. Your blood pressure may be over 180/110. For example, call 911 if: 
· You have symptoms of a heart attack. These may include: ¨ Chest pain or pressure, or a strange feeling in the chest. 
¨ Sweating. ¨ Shortness of breath. ¨ Nausea or vomiting. ¨ Pain, pressure, or a strange feeling in the back, neck, jaw, or upper belly or in one or both shoulders or arms. ¨ Lightheadedness or sudden weakness. ¨ A fast or irregular heartbeat. · You have symptoms of a stroke. These may include: 
¨ Sudden numbness, tingling, weakness, or loss of movement in your face, arm, or leg, especially on only one side of your body. ¨ Sudden vision changes. ¨ Sudden trouble speaking. ¨ Sudden confusion or trouble understanding simple statements. ¨ Sudden problems with walking or balance. ¨ A sudden, severe headache that is different from past headaches. · You have severe back or belly pain. Do not wait until your blood pressure comes down on its own. Get help right away. Call your doctor now or seek immediate care if: 
· Your blood pressure is much higher than normal (such as 180/110 or higher), but you don't have symptoms. · You think high blood pressure is causing symptoms, such as: ¨ Severe headache. ¨ Blurry vision. Watch closely for changes in your health, and be sure to contact your doctor if: 
· Your blood pressure measures 140/90 or higher at least 2 times. That means the top number is 140 or higher or the bottom number is 90 or higher, or both. · You think you may be having side effects from your blood pressure medicine. · Your blood pressure is usually normal, but it goes above normal at least 2 times. Where can you learn more? Go to http://jada-moreno.info/. Enter G315 in the search box to learn more about \"High Blood Pressure: Care Instructions. \" Current as of: August 8, 2016 Content Version: 11.1 © 5771-6441 [employer]. Care instructions adapted under license by [employer] (which disclaims liability or warranty for this information). If you have questions about a medical condition or this instruction, always ask your healthcare professional. [address] any warranty or liability for your use of this information. Patient Instructions History Introducing [facility]! Rosa Elena Mena introduces [employer] patient portal. Now you can access parts of your medical record, email your doctor's office, and request medication refills online. 1. In your internet browser, go to [email or web address]. "[employer]"/[employer] 2. Click on the First Time User? Click Here link in the Sign In box. You will see the New Member Sign Up page. 3. Enter your [employer] Access Code exactly as it appears below. You will not need to use this code after youve completed the sign-up process. If you do not sign up before the expiration date, you must request a new code. · [employer] Access Code: [email or web address] Expires: 4/26/2017  2:48 PM 
 
4. Enter the last four digits of your Social Security Number (xxxx) and Date of Birth (mm/dd/yyyy) as indicated and click Submit. You will be taken to the next sign-up page. 5. Create a [employer] ID. This will be your [employer] login ID and cannot be changed, so think of one that is secure and easy to remember. 6. Create a [employer] password. You can change your password at any time. 7. Enter your Password Reset Question and Answer. This can be used at a later time if you forget your password. 8. Enter your e-mail address. You will receive e-mail notification when new information is available in [address] 9. Click Sign Up. You can now view and download portions of your medical record. 10. Click the Download Summary menu link to download a portable copy of your medical information. If you have questions, please visit the Frequently Asked Questions section of the [employer] website. Remember, [employer] is NOT to be used for urgent needs. For medical emergencies, dial 911. Now available from your iPhone and Android! Please provide this summary of care documentation to your next provider. Your primary care clinician is listed as Claudis Phalen. If you have any questions after today's visit, please call [phone].

## 2017-03-31 RX ORDER — HYDROXYCHLOROQUINE SULFATE 200 MG/1
TABLET, FILM COATED ORAL
Qty: 60 TAB | Refills: 0 | Status: SHIPPED | OUTPATIENT
Start: 2017-03-31 | End: 2017-04-24 | Stop reason: SDUPTHER

## 2017-04-18 ENCOUNTER — APPOINTMENT (OUTPATIENT)
Dept: GENERAL RADIOLOGY | Age: 52
End: 2017-04-18
Attending: PHYSICIAN ASSISTANT
Payer: MEDICAID

## 2017-04-18 ENCOUNTER — APPOINTMENT (OUTPATIENT)
Dept: CT IMAGING | Age: 52
End: 2017-04-18
Attending: PHYSICIAN ASSISTANT
Payer: MEDICAID

## 2017-04-18 ENCOUNTER — HOSPITAL ENCOUNTER (EMERGENCY)
Age: 52
Discharge: HOME | End: 2017-04-18
Attending: EMERGENCY MEDICINE
Payer: MEDICAID

## 2017-04-18 VITALS
OXYGEN SATURATION: 99 % | WEIGHT: 279.32 LBS | BODY MASS INDEX: 43.84 KG/M2 | SYSTOLIC BLOOD PRESSURE: 119 MMHG | RESPIRATION RATE: 16 BRPM | HEART RATE: 61 BPM | HEIGHT: 67 IN | DIASTOLIC BLOOD PRESSURE: 91 MMHG | TEMPERATURE: 97.9 F

## 2017-04-18 DIAGNOSIS — M50.30 DDD (DEGENERATIVE DISC DISEASE), CERVICAL: ICD-10-CM

## 2017-04-18 DIAGNOSIS — M48.02 CERVICAL STENOSIS OF SPINAL CANAL: Primary | ICD-10-CM

## 2017-04-18 LAB
AMORPH CRY URNS QL MICRO: ABNORMAL
APPEARANCE UR: ABNORMAL
BACTERIA URNS QL MICRO: ABNORMAL /HPF
BILIRUB UR QL: NEGATIVE
COLOR UR: ABNORMAL
EPITH CASTS URNS QL MICRO: ABNORMAL /LPF
GLUCOSE UR STRIP.AUTO-MCNC: NEGATIVE MG/DL
HGB UR QL STRIP: NEGATIVE
KETONES UR QL STRIP.AUTO: NEGATIVE MG/DL
LEUKOCYTE ESTERASE UR QL STRIP.AUTO: ABNORMAL
NITRITE UR QL STRIP.AUTO: NEGATIVE
PH UR STRIP: 7.5 [PH] (ref 5–8)
PROT UR STRIP-MCNC: NEGATIVE MG/DL
RBC #/AREA URNS HPF: ABNORMAL /HPF (ref 0–5)
SP GR UR REFRACTOMETRY: 1.02 (ref 1–1.03)
UA: UC IF INDICATED,UAUC: ABNORMAL
UROBILINOGEN UR QL STRIP.AUTO: 1 EU/DL (ref 0.2–1)
WBC URNS QL MICRO: ABNORMAL /HPF (ref 0–4)

## 2017-04-18 PROCEDURE — 74011250636 HC RX REV CODE- 250/636: Performed by: PHYSICIAN ASSISTANT

## 2017-04-18 PROCEDURE — 72125 CT NECK SPINE W/O DYE: CPT

## 2017-04-18 PROCEDURE — 87186 SC STD MICRODIL/AGAR DIL: CPT | Performed by: PHYSICIAN ASSISTANT

## 2017-04-18 PROCEDURE — 96372 THER/PROPH/DIAG INJ SC/IM: CPT

## 2017-04-18 PROCEDURE — 81001 URINALYSIS AUTO W/SCOPE: CPT | Performed by: PHYSICIAN ASSISTANT

## 2017-04-18 PROCEDURE — 87086 URINE CULTURE/COLONY COUNT: CPT | Performed by: PHYSICIAN ASSISTANT

## 2017-04-18 PROCEDURE — 72050 X-RAY EXAM NECK SPINE 4/5VWS: CPT

## 2017-04-18 PROCEDURE — 87077 CULTURE AEROBIC IDENTIFY: CPT | Performed by: PHYSICIAN ASSISTANT

## 2017-04-18 PROCEDURE — 74011250637 HC RX REV CODE- 250/637: Performed by: PHYSICIAN ASSISTANT

## 2017-04-18 PROCEDURE — 99283 EMERGENCY DEPT VISIT LOW MDM: CPT

## 2017-04-18 RX ORDER — PREDNISONE 5 MG/1
TABLET ORAL
Qty: 21 TAB | Refills: 0 | Status: SHIPPED | OUTPATIENT
Start: 2017-04-18 | End: 2018-06-11 | Stop reason: ALTCHOICE

## 2017-04-18 RX ORDER — HYDROCODONE BITARTRATE AND ACETAMINOPHEN 7.5; 325 MG/1; MG/1
1 TABLET ORAL
Qty: 20 TAB | Refills: 0 | Status: SHIPPED | OUTPATIENT
Start: 2017-04-18 | End: 2019-07-17 | Stop reason: ALTCHOICE

## 2017-04-18 RX ORDER — KETOROLAC TROMETHAMINE 30 MG/ML
30 INJECTION, SOLUTION INTRAMUSCULAR; INTRAVENOUS
Status: COMPLETED | OUTPATIENT
Start: 2017-04-18 | End: 2017-04-18

## 2017-04-18 RX ORDER — LIDOCAINE 50 MG/G
2 PATCH TOPICAL EVERY 24 HOURS
Status: DISCONTINUED | OUTPATIENT
Start: 2017-04-18 | End: 2017-04-19 | Stop reason: HOSPADM

## 2017-04-18 RX ORDER — CYCLOBENZAPRINE HCL 5 MG
5-10 TABLET ORAL
Qty: 15 TAB | Refills: 0 | Status: SHIPPED | OUTPATIENT
Start: 2017-04-18 | End: 2019-07-17 | Stop reason: ALTCHOICE

## 2017-04-18 RX ORDER — FOLIC ACID 1 MG/1
TABLET ORAL DAILY
COMMUNITY
End: 2017-07-13 | Stop reason: SDUPTHER

## 2017-04-18 RX ADMIN — KETOROLAC TROMETHAMINE 30 MG: 30 INJECTION, SOLUTION INTRAMUSCULAR at 20:21

## 2017-04-19 NOTE — ED PROVIDER NOTES
HPI Comments: Eze Hinton is a 50 y.o. male with PMhx significant for RA, for which he is on multiple medications to manage, and HTN who presents ambulatory to the ED with cc of L sided neck pain that radiates into the L shoulder x more than two weeks. He also notes occasional tingling of the L shoulder into the L elbow and mild low back pain. Pt reports his pain is exacerbated with movement of his neck and L shoulder. He denies any hx of current symptoms as well as any recent injury, trauma, or falls that could contribute. He notes no change in his sx's with exertion. He endorses taking aleve and applying heat and ice to no relief. Pt reports hx of osteoarthritis, but states he has never seen a spine specialist. Has tried Aleve and Tylenol with no relief. Pt specifically denies any fever, rash, HA, changes in vision, numbness, weakness, SOB, diaphoresis, bowel/bladder dysfunction, F/C, swelling, cough, N/V/D, CP, dysuria, and hematuria. PCP: Miley Jones MD    There are no other complaints, changes or physical findings at this time. The history is provided by the patient. Past Medical History:   Diagnosis Date    Cellulitis and abscess of trunk 7/31/2014    Chronic back pain greater than 3 months duration 7/31/2014    Family history of prostate cancer 7/31/2014    Hyperhydrosis disorder 7/31/2014    Hypertension     Intention tremor 12/9/2015    Prediabetes 6/25/2014    RA [facility]rheumatoid arthritis) ([facility] [address] 7/31/2014    Rash of perineum 6/25/2014    Tinea manus 1/19/2016    Tinea pedis of both feet 1/19/2016    Tobacco abuse 6/25/2014       Past Surgical History:   Procedure Laterality Date    HX ORTHOPAEDIC      right leg and knee         History reviewed. No pertinent family history. Social History     Social History    Marital status:      Spouse name: N/A    Number of children: N/A    Years of education: N/A     Occupational History    Not on file.      Social History Main Topics    Smoking status: Current Every Day Smoker     Packs/day: 1.00    Smokeless tobacco: Never Used    Alcohol use Yes      Comment: occasionally    Drug use: No    Sexual activity: Not Currently     Other Topics Concern    Not on file     Social History Narrative         ALLERGIES: Review of patient's allergies indicates no known allergies. Review of Systems   Constitutional: Negative. Negative for activity change, chills, diaphoresis, fatigue and unexpected weight change. Eyes: Negative for visual disturbance. Respiratory: Negative for cough, chest tightness, shortness of breath and wheezing. Cardiovascular: Negative. Negative for chest pain and palpitations. Gastrointestinal: Negative. Negative for abdominal pain, diarrhea, nausea and vomiting. Genitourinary: Negative. Negative for dysuria, flank pain, frequency and hematuria. Musculoskeletal: Positive for arthralgias (left shoulder), back pain (low) and neck pain (left). Negative for neck stiffness. - swelling   Skin: Negative. Negative for color change and rash. Neurological: Negative. Negative for dizziness, weakness, numbness and headaches.        + tingling L shoulder to elbow   Psychiatric/Behavioral: Negative. Negative for confusion. All other systems reviewed and are negative. Patient Vitals for the past 12 hrs:   Temp Pulse Resp BP SpO2   04/18/17 2213 - 61 16 (!) 119/91 99 %   04/18/17 1920 97.9 °F (36.6 °C) 84 16 (!) 165/110 99 %         Physical Exam   Constitutional: He is oriented to person, place, and time. He appears well-developed. He is active. Non-toxic appearance. No distress. Elevated BMI   HENT:   Head: Normocephalic and atraumatic. Eyes: Conjunctivae are normal. Pupils are equal, round, and reactive to light. Right eye exhibits no discharge. Left eye exhibits no discharge. Neck: Normal range of motion and full passive range of motion without pain. Neck supple.  No tracheal tenderness present. No midline spinous process tenderness  Palpable spasm to left paracervical and L trapezius muscle with reproducible tenderness to palpation and lateral rotation of the neck. Strength 5/5. NVI. NO meningismus. Cardiovascular: Normal rate, regular rhythm, normal heart sounds, intact distal pulses and normal pulses. Exam reveals no gallop and no friction rub. No murmur heard. Pulmonary/Chest: Effort normal and breath sounds normal. No respiratory distress. He has no wheezes. He has no rales. He exhibits no tenderness. Abdominal: Soft. Bowel sounds are normal. He exhibits no distension. There is no tenderness. There is no rebound and no guarding. Musculoskeletal: Normal range of motion. He exhibits tenderness. He exhibits no edema. Neurological: He is alert and oriented to person, place, and time. He has normal strength. No cranial nerve deficit or sensory deficit. Coordination normal.   Strength 5/5 throughout   Skin: Skin is warm, dry and intact. No abrasion and no rash noted. He is not diaphoretic. No erythema. Psychiatric: He has a normal mood and affect. His speech is normal and behavior is normal. Cognition and memory are normal.   Nursing note and vitals reviewed.        MDM  Number of Diagnoses or Management Options  Cervical stenosis of spinal canal:   DDD (degenerative disc disease), cervical:   Diagnosis management comments: DDx: torticollis, DDD, HMP       Amount and/or Complexity of Data Reviewed  Clinical lab tests: ordered and reviewed  Tests in the radiology section of CPT®: ordered and reviewed  Review and summarize past medical records: yes  Independent visualization of images, tracings, or specimens: yes    Patient Progress  Patient progress: stable    ED Course       Procedures    PROGRESS NOTE:  9:45 PM  Spoke with Dr. Mariaelena Walker regarding patient, she states he could also be placed on a steroid pack along with pain medication and muscle relaxant with ortho spine or rheumatology f/u. Written by JOANNE Roger, as dictated by Karlos Donohue. LABORATORY TESTS:  Recent Results (from the past 12 hour(s))   URINALYSIS W/ REFLEX CULTURE    Collection Time: 04/18/17  7:53 PM   Result Value Ref Range    Color YELLOW/STRAW      Appearance CLOUDY (A) CLEAR      Specific gravity 1.021 1.003 - 1.030      pH (UA) 7.5 5.0 - 8.0      Protein NEGATIVE  NEG mg/dL    Glucose NEGATIVE  NEG mg/dL    Ketone NEGATIVE  NEG mg/dL    Bilirubin NEGATIVE  NEG      Blood NEGATIVE  NEG      Urobilinogen 1.0 0.2 - 1.0 EU/dL    Nitrites NEGATIVE  NEG      Leukocyte Esterase SMALL (A) NEG      WBC 5-10 0 - 4 /hpf    RBC 0-5 0 - 5 /hpf    Epithelial cells MODERATE (A) FEW /lpf    Bacteria 1+ (A) NEG /hpf    UA:UC IF INDICATED URINE CULTURE ORDERED (A) CNI      Amorphous Crystals 3+ (A) NEG       IMAGING RESULTS:  CT SPINE CERV WO CONT   Final Result   EXAM: CT CERVICAL SPINE WITHOUT CONTRAST     INDICATION: Left neck, tenderness for a few weeks. No history of trauma. History  of rheumatoid arthritis.     COMPARISON: None.     TECHNIQUE: Multislice helical CT of the cervical spine was performed without  intravenous contrast administration. Sagittal and coronal reconstructions were  generated. CT dose reduction was achieved through use of a standardized  protocol tailored for this examination and automatic exposure control for dose  modulation.      CONTRAST: None.     FINDINGS:     The alignment is within normal limits. There is no fracture or subluxation. The  odontoid process is intact. The craniocervical junction is within normal limits. The prevertebral soft tissues are within normal limits.     C2-C3: There is no spinal canal or neural foraminal stenosis. The central canal  measures 1 cm anterior to posterior.     C3-C4: There is a disc osteophyte, complex eccentric to the left. The central  canal measures 8.5 mm anterior to posterior.  There is severe left-sided neural  foraminal stenosis (series 3, image 36). .     C4-C5: There is a disc osteophyte complex with a superimposed central  protrusion. The central canal measures 6.5 mm anterior to posterior. There is  minimal right and moderate left neural foraminal stenosis secondary to  uncovertebral joint hypertrophy (image 41).    C5-C6: There is a disc osteophyte complex, eccentric to the left. Central canal  measures 8.1 mm anterior to posterior. There is moderate bilateral neural  foraminal stenosis secondary to the uncovertebral joint hypertrophy (series 3,  image 47). .     C6-C7: There is a disc osteophyte complex, eccentric to the left. A  superimposed central protrusion contacts the anterior cord. The central canal  measures 8.9 mm anterior to posterior. There is mild left-sided osseous  neuroforaminal stenosis. .     C7-T1: There is no spinal canal or neural foraminal stenosis. Moderate quantity  mottle.     There is mild anterior subluxation of both mandibular condyles.     IMPRESSION  IMPRESSION:     No fracture  Central canal stenosis from C3-4 to C6-7  Severe left C3-4, moderate left C4-5, and moderate bilateral C5-6 neural  foraminal stenosis      XR SPINE CERV 4 OR 5 V   Final Result    INDICATION: neck pain.     EXAM: CERVICAL SPINE.     AP, lateral, odontoid and bilateral oblique views of the cervical spine are  obtained.     Images demonstrate no acute fracture and no subluxation. There is chronic  appearing mild loss of vertebral body height, possibly developmental, involving  C4, C5 and C6. There is degenerative disc disease C3-C7. No significant   foraminal encroachment by bone spurs is suggested. Soft tissues are  unremarkable.     IMPRESSION  IMPRESSION: Chronic and degenerative findings.  No acute disease.                MEDICATIONS GIVEN:  Medications   lidocaine (LIDODERM) 5 % patch 2 Patch (2 Patches TransDERmal Apply Patch 4/18/17 2021)   ketorolac (TORADOL) injection 30 mg (30 mg IntraMUSCular Given 4/18/17 2021)       IMPRESSION:  1. Cervical stenosis of spinal canal    2. DDD (degenerative disc disease), cervical        PLAN:  1. Discharge home  Current Discharge Medication List      START taking these medications    Details   predniSONE (STERAPRED) 5 mg dose pack Take as directed with food x 6 days. See administration instruction per 5mg dose pack  Qty: 21 Tab, Refills: 0      cyclobenzaprine (FLEXERIL) 5 mg tablet Take 1-2 Tabs by mouth three (3) times daily as needed for Muscle Spasm(s). Qty: 15 Tab, Refills: 0      HYDROcodone-acetaminophen (NORCO) 7.5-325 mg per tablet Take 1 Tab by mouth every six (6) hours as needed for Pain. Max Daily Amount: 4 Tabs. Qty: 20 Tab, Refills: 0           2. Follow-up Information     Follow up With Details Comments [place] [address] [place], [place] Schedule an appointment as soon as possible for a visit  [facility]  [facility]      Carlos Chandra MD Schedule an appointment as soon as possible for a visit [address] [facility]  [address] [place] [address]  [address]       Chun Ureña MD Schedule an appointment as soon as possible for a visit [address]  [phone]          Return to ED if worse     DISCHARGE NOTE:  10:02 PM  The patient is ready for discharge. The patients signs, symptoms, diagnosis, and instructions for discharge have been discussed and the pt has conveyed their understanding. The patient is to follow up as recommended with PCP or return to the ER should their symptoms worsen. Plan has been discussed and patient has conveyed their agreement. This note is prepared by Arianna Jackson, acting as Scribe for [employer].     JOCY Sparks: The scribe's documentation has been prepared under my direction and personally reviewed by me in its entirety. I confirm that the note above accurately reflects all work, treatment, procedures, and medical decision making performed by me. This note will not be viewable in [address]

## 2017-04-19 NOTE — ED NOTES
Pt given discharge instructions and prescripitions by Jamee KNOX . Pt able to verbalize understanding of these instructions at this time. No other questions. Pt able to ambulate out of ED at this time.

## 2017-04-19 NOTE — DISCHARGE INSTRUCTIONS
Cervical Disc Disease: Care Instructions  Your Care Instructions    Cervical disc disease results from damage, disease, or wear and tear to the discs between the bones (vertebra) in your neck. The discs act as shock absorbers for the spine and keep the spine flexible. When a disc is damaged, it can bulge out and press against the nerve roots or spinal cord. This is sometimes called a herniated or \"slipped disc. \" This pressure can cause pain and numbness or tingling in your arms and hands. It can also cause weakness in your legs. An accident can damage a disc and cause it to break open (rupture). Aging and hard physical work can also cause damage to cervical discs. The first treatments for cervical disc disease include physical therapy, special neck exercises, heat, and pain medicine. If these fail, your doctor may inject steroids and pain medicine into your neck. Surgery is usually done only if other treatments have not worked. Follow-up care is a key part of your treatment and safety. Be sure to make and go to all appointments, and call your doctor if you are having problems. It's also a good idea to know your test results and keep a list of the medicines you take. How can you care for yourself at home? · Take pain medicines exactly as directed. ¨ If the doctor gave you a prescription medicine for pain, take it as prescribed. ¨ If you are not taking a prescription pain medicine, ask your doctor if you can take an over-the-counter medicine. · Don't spend too long in one position. Take short breaks to move around and change positions. · Wear a seat belt and shoulder harness when you are in a car. · Sleep with a pillow under your head and neck that keeps your neck straight. · Follow your doctor's instructions for gentle neck-stretching exercises. · Do not smoke. Smoking can slow healing of your discs. If you need help quitting, talk to your doctor about stop-smoking programs and medicines.  These can increase your chances of quitting for good. · Avoid strenuous work or exercise until your doctor says it is okay. When should you call for help? Call 911 anytime you think you may need emergency care. For example, call if:  · You are unable to move an arm or a leg at all. Call your doctor now or seek immediate medical care if:  · You have new or worse symptoms in your arms, legs, chest, belly, or buttocks. Symptoms may include:  ¨ Numbness or tingling. ¨ Weakness. ¨ Pain. · You lose bladder or bowel control. Watch closely for changes in your health, and be sure to contact your doctor if:  · You are not getting better as expected. Where can you learn more? Go to http://jada-moreno.info/. Enter N118 in the search box to learn more about \"Cervical Disc Disease: Care Instructions. \"  Current as of: May 23, 2016  Content Version: 11.2  © 9535-3008 [employer]. Care instructions adapted under license by [employer] (which disclaims liability or warranty for this information). If you have questions about a medical condition or this instruction, always ask your healthcare professional. [place] [address] any warranty or liability for your use of this information. Spinal Stenosis: Care Instructions  Your Care Instructions    Spinal stenosis is a narrowing of the canal that surrounds the spinal cord and nerve roots. The spine is made up of bones, or vertebrae. The spinal cord runs through an opening in the bones called the spinal canal. Sometimes, bone and ligament and disc tissue grow into this canal and press on the nerves that branch out from the spinal cord. This causes pain, numbness, or weakness--most often in the arms, legs, feet, and rear end (buttocks). Spinal stenosis can happen as you age. It can occur in the lower back or the neck.   You may be able to treat spinal stenosis with pain medicine and exercises to keep your spine strong and flexible. Your doctor may suggest physical therapy. Some people try cortisone (corticosteroid) shots to reduce swelling. However, you may need surgery if your pain and numbness are so bad that you cannot do normal activities. Follow-up care is a key part of your treatment and safety. Be sure to make and go to all appointments, and call your doctor if you are having problems. It's also a good idea to know your test results and keep a list of the medicines you take. How can you care for yourself at home? · Ask your doctor if you can take an over-the-counter pain medicine, such as acetaminophen (Tylenol), ibuprofen (Advil, Motrin), or naproxen (Aleve). Be safe with medicines. Read and follow all instructions on the label. · Reach and stay at a healthy weight. Too much weight is hard on your spine. · Change positions when sitting to ease pain. For example, lean forward. This may reduce pressure on the spinal cord and its nerves. · Stretch your back muscles as your doctor or physical therapist recommends. Here are a few exercises to try if your doctor says it is okay. ¨ Lie on your back and gently pull one bent knee to your chest. Put that foot back on the floor and then pull the other knee to your chest.  ¨ Do pelvic tilts. Lie on your back with your knees bent. Tighten your stomach muscles. Pull your belly button (navel) in and up toward your ribs. You should feel like your back is pressing to the floor and your hips and pelvis are slightly lifting off the floor. Hold for 6 seconds while breathing smoothly. ¨ Press your back flat against a wall and slide down into a half squat. Hold for 6 seconds while breathing normally. When should you call for help? Call 911 anytime you think you may need emergency care. For example, call if:  · You are unable to move a leg at all. Call your doctor now or seek immediate medical care if:  · You have new or worse symptoms in your arms, legs, chest, belly, or buttocks. Symptoms may include:  ¨ Numbness or tingling. ¨ Weakness. ¨ Pain. · You lose bladder or bowel control. Watch closely for changes in your health, and be sure to contact your doctor if:  · You are not getting better as expected. Where can you learn more? Go to http://jada-moreno.info/. Enter  in the search box to learn more about \"Spinal Stenosis: Care Instructions. \"  Current as of: May 23, 2016  Content Version: 11.2  © 0775-9841 [employer]. Care instructions adapted under license by "[employer] (which disclaims liability or warranty for this information). If you have questions about a medical condition or this instruction, always ask your healthcare professional. [address] any warranty or liability for your use of this information.

## 2017-04-20 LAB
BACTERIA SPEC CULT: ABNORMAL
CC UR VC: ABNORMAL
SERVICE CMNT-IMP: ABNORMAL

## 2017-04-20 RX ORDER — CIPROFLOXACIN 500 MG/1
500 TABLET ORAL 2 TIMES DAILY
Qty: 10 TAB | Refills: 0 | Status: SHIPPED | OUTPATIENT
Start: 2017-04-20 | End: 2017-04-25

## 2017-04-20 NOTE — PROGRESS NOTES
Contacted patient, verified  and allergies. Discussed culture results. Called in Cipro to [facility] as directed by patient.

## 2017-04-24 ENCOUNTER — OFFICE VISIT (OUTPATIENT)
Dept: RHEUMATOLOGY | Age: 52
End: 2017-04-24

## 2017-04-24 VITALS
TEMPERATURE: 96.9 F | SYSTOLIC BLOOD PRESSURE: 124 MMHG | DIASTOLIC BLOOD PRESSURE: 65 MMHG | RESPIRATION RATE: 20 BRPM | OXYGEN SATURATION: 97 % | HEIGHT: 67 IN | WEIGHT: 279.4 LBS | BODY MASS INDEX: 43.85 KG/M2 | HEART RATE: 87 BPM

## 2017-04-24 DIAGNOSIS — M05.79 SEROPOSITIVE RHEUMATOID ARTHRITIS OF MULTIPLE SITES (HCC): Primary | ICD-10-CM

## 2017-04-24 DIAGNOSIS — M19.011 OSTEOARTHRITIS, LOCALIZED, SHOULDER, RIGHT: ICD-10-CM

## 2017-04-24 RX ORDER — HYDROXYCHLOROQUINE SULFATE 200 MG/1
400 TABLET, FILM COATED ORAL 2 TIMES DAILY
Qty: 60 TAB | Refills: 11 | Status: SHIPPED | OUTPATIENT
Start: 2017-04-24 | End: 2018-03-12 | Stop reason: SDUPTHER

## 2017-04-24 RX ORDER — METHOTREXATE 2.5 MG/1
10 TABLET ORAL
Qty: 16 TAB | Refills: 11 | Status: SHIPPED | OUTPATIENT
Start: 2017-04-24 | End: 2017-05-24

## 2017-04-24 RX ORDER — METHOTREXATE 2.5 MG/1
TABLET ORAL
COMMUNITY
End: 2017-04-24 | Stop reason: SDUPTHER

## 2017-04-24 RX ORDER — LIDOCAINE HYDROCHLORIDE 10 MG/ML
1 INJECTION INFILTRATION; PERINEURAL ONCE
Qty: 1 VIAL | Refills: 0
Start: 2017-04-24 | End: 2017-04-24

## 2017-04-24 NOTE — MR AVS SNAPSHOT
Visit Information Date & Time Provider Department Dept. Phone Encounter #  
 4/24/2017  1:40 PM Slick Costa MD [facility] [id number] Your Appointments 9/20/2017 11:00 AM  
ROUTINE CARE with Anna Bowen MD  
[address] [place] [address] OFFICE-[facility] ([address]) Appt Note: [address] [place] [address] [phone] [facility] [phone] Upcoming Health Maintenance Date Due INFLUENZA AGE 9 TO ADULT 8/1/2016 FOBT Q 1 YEAR AGE 50-75 4/1/2017 DTaP/Tdap/Td series (2 - Td) 6/1/2025 Allergies as of 4/24/2017  Review Complete On: 4/24/2017 By: Grey Prasad LPN No Known Allergies Current Immunizations  Reviewed on 6/1/2015 Name Date Tdap 6/1/2015 Not reviewed this visit Vitals BP Pulse Temp Resp Height(growth percentile) Weight(growth percentile) 124/65 (BP 1 Location: Left arm, BP Patient Position: Sitting) 87 96.9 °F (36.1 °C) (Oral) 20 5' 7\" (1.702 m) 279 lb 6.4 oz (126.7 kg) SpO2 BMI Smoking Status 97% 43.76 kg/m2 Current Every Day Smoker Vitals History BMI and BSA Data Body Mass Index Body Surface Area 43.76 kg/m 2 2.45 m 2 Preferred Pharmacy Pharmacy Name Phone [address] [phone] Your Updated Medication List  
  
   
This list is accurate as of: 4/24/17  2:32 PM.  Always use your most recent med list.  
  
  
  
  
 aspirin delayed-release 81 mg tablet Take 1 Tab by mouth daily. atenolol 25 mg tablet Commonly known as:  TENORMIN Take 1 Tab by mouth nightly. Indications: intentional tremor  
  
 chlorthalidone 25 mg tablet Commonly known as:  HYGROTEN  
TAKE ONE TABLET BY MOUTH DAILY  Indications: hypertension  
  
 ciprofloxacin HCl 500 mg tablet Commonly known as:  CIPRO Take 1 Tab by mouth two (2) times a day for 5 days. cyclobenzaprine 5 mg tablet Commonly known as:  FLEXERIL Take 1-2 Tabs by mouth three (3) times daily as needed for Muscle Spasm(s). ENBREL SURECLICK 50 mg/mL (6.34 mL) injection Generic drug:  etanercept Inject 50 mg under the skin every week  
  
 folic acid 1 mg tablet Commonly known as:  Google Take  by mouth daily. HYDROcodone-acetaminophen 7.5-325 mg per tablet Commonly known as:  Nisreen Cruise Take 1 Tab by mouth every six (6) hours as needed for Pain. Max Daily Amount: 4 Tabs. hydroxychloroquine 200 mg tablet Commonly known as:  PLAQUENIL  
TAKE ONE TABLET BY MOUTH TWICE A DAY  
  
 methotrexate 2.5 mg tablet Commonly known as:  [place] Take  by mouth every Monday. Takes 8 Tablets Every Monday  
  
 predniSONE 5 mg dose pack Commonly known as:  STERAPRED Take as directed with food x 6 days. See administration instruction per 5mg dose pack Introducing [facility]! [employer] introduces [employer] patient portal. Now you can access parts of your medical record, email your doctor's office, and request medication refills online. 1. In your internet browser, go to [email or web address] 2. Click on the First Time User? Click Here link in the Sign In box. You will see the New Member Sign Up page. 3. Enter your [employer] Access Code exactly as it appears below. You will not need to use this code after youve completed the sign-up process. If you do not sign up before the expiration date, you must request a new code. · [employer] Access Code: [email or web address] Expires: 4/26/2017  2:48 PM 
 
4. Enter the last four digits of your Social Security Number (xxxx) and Date of Birth (mm/dd/yyyy) as indicated and click Submit. You will be taken to the next sign-up page. 5. Create a [employer] ID.  This will be your [employer] login ID and cannot be changed, so think of one that is secure and easy to remember. 6. Create a [employer] password. You can change your password at any time. 7. Enter your Password Reset Question and Answer. This can be used at a later time if you forget your password. 8. Enter your e-mail address. You will receive e-mail notification when new information is available in [address] 9. Click Sign Up. You can now view and download portions of your medical record. 10. Click the Download Summary menu link to download a portable copy of your medical information. If you have questions, please visit the Frequently Asked Questions section of the [employer] website. Remember, [employer] is NOT to be used for urgent needs. For medical emergencies, dial 911. Now available from your iPhone and Android! Please provide this summary of care documentation to your next provider. Your primary care clinician is listed as Jake Narayan. If you have any questions after today's visit, please call [phone].

## 2017-04-24 NOTE — PROGRESS NOTES
RHEUMATOLOGY PROBLEM LIST AND CHIEF COMPLAINT  1. Rheumatoid Arthritis (2015) - Joint pain/swelling, synovitis in hands, wrists, elbows, rheumatoid nodules on elbows, morning stiffness. Elevated ESR, CCP, RF, CRP. Therapy History:  Current DMARDs: MTX (Past-09/2016, 10/2016-current). Enbrel (11/2015-current)    2. Shoulder OA and rotator cuff disease    INTERVAL HISTORY  This is a 50 y.o.  male. Today, the patient complains of pain in the joints. Location: shoulder  Severity:  8 on a scale of 0-10  Timing: all day   Duration:  a few months  Modifying factors: Enbrel  Context/Associated signs and symptoms: The patient reports going to the ER recently for a UTI. He complains of pain in his left shoulder today. He states that this pain was severe enough that he went to the ER and he was treated with prednisone and hydrocodone. He denies much relief from this. He admits to relief from previous steroid injections in his shoulder. He also complains that the nodules over his left elbow are getting larger. He reports that he bumped his elbow against a door recently and this caused pain to his nodules. He admits to some right shoulder pain and back pain, but he denies any other arthralgia. He continues on Enbrel weekly and methotrexate 20 mg weekly. RHEUMATOLOGY REVIEW OF SYSTEMS   Positives as per history  Negatives as follows:  Ricciramone Bonilla:  Denies unexplained persistent fevers or weight change  RESPIRATORY:  No pleuritic pain, exertional dyspnea  CARDIOVASCULAR:  Denies chest pain  GASTRO:   Denies heartburn, abdominal pain, nausea, vomiting, diarrhea  SKIN:    Denies rash   MSK:    No morning stiffness >1 hour, persistent joint swelling, persistent joint pain    PAST MEDICAL HISTORY  Reviewed with patient, significant changes in medical history - Yes, recent UTI    PHYSICAL EXAM  Blood pressure 124/65, pulse 87, temperature 96.9 °F (36.1 °C), temperature source Oral, resp.  rate 20, height 5' 7\" (1.702 m), weight 279 lb 6.4 oz (126.7 kg), SpO2 97 %. GENERAL APPEARANCE: Well-nourished, no acute distress  NECK: No adenopathy  ENT: No oral ulcers  CARDIOVASCULAR: Heart rhythm is regular. No murmur, rub, gallop  CHEST: Normal vesicular breath sounds. No wheezes, rales, pleural friction rubs  ABDOMINAL: The abdomen is soft and nontender. Bowel sounds are normal  SKIN: No rash, palpable purpura, digital ulcer, abnormal thickening   MUSCULOSKELETAL: Rotator cuff signs on the left worse  Upper extremities -  2 Nodules noted over left elbow -larger, no synovitis  Lower extremities - full range of motion, no tenderness, no swelling, no synovial thickening and no deformity of joints     LABS, RADIOLOGY AND PROCEDURES   Previous labs reviewed -Yes    ASSESSMENT  1. Rheumatoid Arthritis (Established problem -  Stable disease) - The patient's RA is stable on Enbrel weekly and Methotrexate 15 mg weekly. His shoulder pain today is from his osteoarthritis. The nodules over his left elbow have increased in size, and I question whether this could be methotrexate induced nodulosis. I want him to decrease his methotrexate to 10 mg weekly as this should halt any growth of his nodules. 2. Drug therapy monitoring for toxicity (methotrexate/Enbrel) - CBC, BUN, Cr, AST, ALT and albumin every 4 months  3. Osteoarthritis - The patient's back is causing him pain today. I have recommended seeing a back specialist and referred him to physical therapy. His left shoulder improved with his previous steroid injection. PLAN  1. Continue Enbrel weekly  2. Decrease Methotrexate to 10 mg weekly  3. Left shoulder steroid injection    Refugio Ramirez MD  Adult and Pediatric Rheumatology     [address] [place] [address], Phone [phone], Fax [phone]     Visiting  of Pediatrics    Department of Pediatrics, [address] [facility]   [address], [address], Phone [phone], Fax [phone]    There are no Patient Instructions on file for this visit. cc:  Benny Apodaca MD    Written by jeffery Bermudez, as dictated by Loreto Coe. Marianna Nieves M.D. [facility]  OFFICE PROCEDURE PROGRESS NOTE        Chart reviewed for the following:   Carmen RAYA, have reviewed the History, Physical and updated the Allergic reactions for [address] performed immediately prior to start of procedure:   Carmen RAYA, have performed the following reviews on [employer] prior to the start of the procedure:            * Patient was identified by name and date of birth   * Agreement on procedure being performed was verified  * Risks and Benefits explained to the patient  * Procedure site verified and marked as necessary  * Patient was positioned for comfort  * Consent was signed and verified     Time: 2:25 pm      Date of procedure: 4/24/2017    Procedure performed by: Carmen Recio MD    Provider assisted by: none    Patient assisted by: self    How tolerated by patient: tolerated the procedure well with no complications    Post Procedural Pain Scale: 0 - No Hurt    Comments: none      PROCEDURE  After consent was obtained, using sterile technique the left shoulder was prepped and Depo-medrol 40 mg and 1ml of lidocaine was then injected and the needle withdrawn. The procedure was well tolerated. The patient is asked to continue to rest for 24 hours before resuming regular activities. It may be more painful for the first 1-2 days. Watch for fever, or increased swelling or persistent pain. Call or return to clinic prn if such symptoms occur.

## 2017-07-13 ENCOUNTER — OFFICE VISIT (OUTPATIENT)
Dept: RHEUMATOLOGY | Age: 52
End: 2017-07-13

## 2017-07-13 VITALS
BODY MASS INDEX: 42.25 KG/M2 | TEMPERATURE: 97.6 F | RESPIRATION RATE: 16 BRPM | SYSTOLIC BLOOD PRESSURE: 120 MMHG | WEIGHT: 269.2 LBS | HEIGHT: 67 IN | OXYGEN SATURATION: 95 % | HEART RATE: 95 BPM | DIASTOLIC BLOOD PRESSURE: 89 MMHG

## 2017-07-13 DIAGNOSIS — M05.79 SEROPOSITIVE RHEUMATOID ARTHRITIS OF MULTIPLE SITES (HCC): Primary | ICD-10-CM

## 2017-07-13 RX ORDER — FOLIC ACID 1 MG/1
1 TABLET ORAL DAILY
Qty: 30 TAB | Refills: 6 | Status: SHIPPED | OUTPATIENT
Start: 2017-07-13 | End: 2018-03-12 | Stop reason: ALTCHOICE

## 2017-07-13 RX ORDER — TACROLIMUS 1 MG/G
OINTMENT TOPICAL 2 TIMES DAILY
Qty: 30 G | Refills: 6 | Status: SHIPPED | OUTPATIENT
Start: 2017-07-13 | End: 2021-10-11 | Stop reason: ALTCHOICE

## 2017-07-13 RX ORDER — METHOTREXATE 2.5 MG/1
TABLET ORAL
COMMUNITY
End: 2021-07-19 | Stop reason: ALTCHOICE

## 2017-07-13 NOTE — MR AVS SNAPSHOT
Visit Information Date & Time Provider Department Dept. Phone Encounter #  
 7/13/2017  3:00 PM Suman Horvath MD [facility] [id number] Your Appointments 9/20/2017 11:00 AM  
ROUTINE CARE with Victor M Guadalupe MD  
[address] [place] [address] OFFICE-[facility] ([address]) Appt Note: 6 mnth fu  
 [address] [facility] [address] [phone] [place] [phone] Upcoming Health Maintenance Date Due FOBT Q 1 YEAR AGE 50-75 4/1/2017 INFLUENZA AGE 9 TO ADULT 8/1/2017 DTaP/Tdap/Td series (2 - Td) 6/1/2025 Allergies as of 7/13/2017  Review Complete On: 7/13/2017 By: Suman Horvath MD  
 No Known Allergies Current Immunizations  Reviewed on 6/1/2015 Name Date Tdap 6/1/2015 Not reviewed this visit You Were Diagnosed With   
  
 Codes Comments Seropositive rheumatoid arthritis of multiple sites [facility])    -  Primary ICD-10-CM: M05.79 ICD-9-CM: 714.0 Vitals BP Pulse Temp Resp Height(growth percentile) Weight(growth percentile) 120/89 (BP 1 Location: Right arm, BP Patient Position: Sitting) 95 97.6 °F (36.4 °C) (Oral) 16 5' 7\" (1.702 m) 269 lb 3.2 oz (122.1 kg) SpO2 BMI Smoking Status 95% 42.16 kg/m2 Current Every Day Smoker Vitals History BMI and BSA Data Body Mass Index Body Surface Area  
 42.16 kg/m 2 2.4 m 2 Preferred Pharmacy Pharmacy Name Phone [facility] [address], [address] [place] [address] Lakisha Beard [phone] Your Updated Medication List  
  
   
This list is accurate as of: 7/13/17  4:11 PM.  Always use your most recent med list.  
  
  
  
  
 aspirin delayed-release 81 mg tablet Take 1 Tab by mouth daily. atenolol 25 mg tablet Commonly known as:  TENORMIN Take 1 Tab by mouth nightly. Indications: intentional tremor chlorthalidone 25 mg tablet Commonly known as:  HYGROTEN  
TAKE ONE TABLET BY MOUTH DAILY  Indications: hypertension  
  
 cyclobenzaprine 5 mg tablet Commonly known as:  FLEXERIL Take 1-2 Tabs by mouth three (3) times daily as needed for Muscle Spasm(s). ENBREL SURECLICK 50 mg/mL (7.05 mL) injection Generic drug:  etanercept Inject 50 mg under the skin every week  
  
 folic acid 1 mg tablet Commonly known as:  Google Take 1 Tab by mouth daily. HYDROcodone-acetaminophen 7.5-325 mg per tablet Commonly known as:  Marguerite Dimairam Take 1 Tab by mouth every six (6) hours as needed for Pain. Max Daily Amount: 4 Tabs. hydroxychloroquine 200 mg tablet Commonly known as:  PLAQUENIL Take 2 Tabs by mouth two (2) times a day. methotrexate 2.5 mg tablet Commonly known as:  Clearance Both Take  by mouth every Sunday. Takes 4 Tablets Every Sunday  
  
 predniSONE 5 mg dose pack Commonly known as:  STERAPRED Take as directed with food x 6 days. See administration instruction per 5mg dose pack  
  
 tacrolimus 0.1 % ointment Commonly known as:  PROTOPIC Apply  to affected area two (2) times a day. Prescriptions Sent to Pharmacy Refills  
 folic acid (FOLVITE) 1 mg tablet 6 Sig: Take 1 Tab by mouth daily. Class: Normal  
 Pharmacy: [address]  [address] [facility] Ph #: [phone] Route: Oral  
 tacrolimus (PROTOPIC) 0.1 % ointment 6 Sig: Apply  to affected area two (2) times a day. Class: Normal  
 Pharmacy: [address]  [address] [facility] Ph #: [phone] Route: Topical  
  
We Performed the Following ALBUMIN [id number] CPT(R)] ALT K3042805 CPT(R)] AST T1180781 CPT(R)] BUN S5947153 CPT(R)] CBC+PLATELET+HEM REVIEW [23962 CPT(R)] CREATININE [id number] CPT(R)] Introducing [facility]! [employer] introduces [employer]obab patient portal. Now you can access parts of your medical record, email your doctor's office, and request medication refills online. 1. In your internet browser, go to [email or web address] 2. Click on the First Time User? Click Here link in the Sign In box. You will see the New Member Sign Up page. 3. Enter your Baobab Access Code exactly as it appears below. You will not need to use this code after youve completed the sign-up process. If you do not sign up before the expiration date, you must request a new code. · Baobab Access Code: [email or web address] Expires: 10/11/2017  4:11 PM 
 
4. Enter the last four digits of your Social Security Number (xxxx) and Date of Birth (mm/dd/yyyy) as indicated and click Submit. You will be taken to the next sign-up page. 5. Create a Baobab ID. This will be your Baobab login ID and cannot be changed, so think of one that is secure and easy to remember. 6. Create a Baobab password. You can change your password at any time. 7. Enter your Password Reset Question and Answer. This can be used at a later time if you forget your password. 8. Enter your e-mail address. You will receive e-mail notification when new information is available in [address] 9. Click Sign Up. You can now view and download portions of your medical record. 10. Click the Download Summary menu link to download a portable copy of your medical information. If you have questions, please visit the Frequently Asked Questions section of the Baobab website. Remember, Baobab is NOT to be used for urgent needs. For medical emergencies, dial 911. Now available from your iPhone and Android! Please provide this summary of care documentation to your next provider. Your primary care clinician is listed as Zoraida Rowland. If you have any questions after today's visit, please call [phone].

## 2017-07-13 NOTE — PROGRESS NOTES
RHEUMATOLOGY PROBLEM LIST AND CHIEF COMPLAINT  1. Rheumatoid Arthritis (2015) - Joint pain/swelling, synovitis in hands, wrists, elbows, rheumatoid nodules on elbows, morning stiffness. Elevated ESR, CCP, RF, CRP. Therapy History:  Current DMARDs: MTX (Past-09/2016, 10/2016-current). Enbrel (11/2015-current), Plaquenil (2015 - current)    2. Shoulder OA and rotator cuff disease    INTERVAL HISTORY  This is a 50 y.o.  male. Today, the patient complains of pain in the joints. Location: NA  Severity:  0 on a scale of 0-10  Timing: none at this time   Duration:  3 months  Context/Associated signs and symptoms: The patient states he is doing well and the shoulder does not bother him anymore. He reports that he is back to work. He was wondering about Jodene Niki use because he is changing insurances and someone told him this was a good medication. He mentions that his rheumatoid nodules on his elbows have been bothering him. He states that Plaquenil has not been effective in treating his nodules. He states that when he stops his methotrexate he has some stiffness. He continues on Methotrexate 10 mg weekly, Plaquenil , and Enbrel 50 mgweekly. There have been no adverse effects to the current medication. The patient has no other complaints. RHEUMATOLOGY REVIEW OF SYSTEMS   Positives as per history  Negatives as follows:  Ranjan Dials:  Denies unexplained persistent fevers or weight change  RESPIRATORY:  No pleuritic pain, exertional dyspnea  CARDIOVASCULAR:  Denies chest pain  GASTRO:   Denies heartburn, abdominal pain, nausea, vomiting, diarrhea  SKIN:    Denies rash   MSK:    No morning stiffness >1 hour, persistent joint swelling, persistent joint pain    PAST MEDICAL HISTORY  Reviewed with patient, significant changes in medical history - no    PHYSICAL EXAM  Blood pressure 120/89, pulse 95, temperature 97.6 °F (36.4 °C), temperature source Oral, resp.  rate 16, height 5' 7\" (1.702 m), weight 269 lb 3.2 oz (122.1 kg), SpO2 95 %. GENERAL APPEARANCE: Well-nourished, no acute distress  NECK: No adenopathy  ENT: No oral ulcers  CARDIOVASCULAR: Heart rhythm is regular. No murmur, rub, gallop  CHEST: Normal vesicular breath sounds. No wheezes, rales, pleural friction rubs  ABDOMINAL: The abdomen is soft and nontender. Bowel sounds are normal  SKIN: No rash, palpable purpura, digital ulcer, abnormal thickening   MUSCULOSKELETAL: Rotator cuff signs on the left have improved  Upper extremities -  2 Nodules noted over left elbow -larger, no synovitis  Lower extremities - full range of motion, no tenderness, no swelling, no synovial thickening and no deformity of joints     LABS, RADIOLOGY AND PROCEDURES   Previous labs reviewed -Yes    ASSESSMENT  1. Rheumatoid Arthritis (Established problem -  Stable disease) - The patient's RA is stable on Enbrel weekly and Methotrexate 10 mg weekly. Since his nodules continue to bother him, I want him to stop his plaquenil and start topical tacrolimus. He should continue with Methotrexate 10 mg weekly and Enbrel 50 mg weekly. He should return in 4 months for a follow up. I will check labs today. 2. Drug therapy monitoring for toxicity (methotrexate/Enbrel) - CBC, BUN, Cr, AST, ALT and albumin every 4 months  3. Osteoarthritis - He did not complain about this today. His left shoulder improved with his previous steroid injection. PLAN  1. Enbrel 50 mg weekly  2. Methotrexate 10 mg weekly  3. Stop Plaquenil  4. Start topical tacrolimus for nodules  5. CBC, CMP, AST, ALT, Albumin, BUN. 6. Return in 4 months    Refugio Ruiz MD  Adult and Pediatric Rheumatology     [facility] [address], Phone [phone], Fax [phone]     Visiting  of Pediatrics    Department of Pediatrics, [facility] of [address] [place] [address], [address] [place] [address], Phone [phone], Fax [phone]    There are no Patient Instructions on file for this visit. cc:  Christel Mendez MD    Written by jeffery Cooper, as dictated by Mariola Foster.  Angeline Solis M.D.

## 2017-07-14 LAB
ALBUMIN SERPL-MCNC: 4.3 G/DL (ref 3.5–5.5)
ALT SERPL-CCNC: 33 IU/L (ref 0–44)
AST SERPL-CCNC: 28 IU/L (ref 0–40)
BASOPHILS # BLD MANUAL: 0.1 X10E3/UL (ref 0–0.2)
BASOPHILS NFR BLD MANUAL: 1 %
BUN SERPL-MCNC: 15 MG/DL (ref 6–24)
CREAT SERPL-MCNC: 1.03 MG/DL (ref 0.76–1.27)
DIFFERENTIAL COMMENT, 115260: ABNORMAL
EOSINOPHIL # BLD MANUAL: 0.2 X10E3/UL (ref 0–0.4)
EOSINOPHIL NFR BLD MANUAL: 2 %
ERYTHROCYTE [DISTWIDTH] IN BLOOD BY AUTOMATED COUNT: 14.4 % (ref 12.3–15.4)
HCT VFR BLD AUTO: 40.8 % (ref 37.5–51)
HGB BLD-MCNC: 13.5 G/DL (ref 12.6–17.7)
LYMPHOCYTES # BLD MANUAL: 3.8 X10E3/UL (ref 0.7–3.1)
LYMPHOCYTES NFR BLD MANUAL: 38 %
MCH RBC QN AUTO: 28.9 PG (ref 26.6–33)
MCHC RBC AUTO-ENTMCNC: 33.1 G/DL (ref 31.5–35.7)
MCV RBC AUTO: 87 FL (ref 79–97)
MONOCYTES # BLD MANUAL: 0.6 X10E3/UL (ref 0.1–0.9)
MONOCYTES NFR BLD MANUAL: 6 %
NEUTROPHILS # BLD MANUAL: 5.4 X10E3/UL (ref 1.4–7)
NEUTROPHILS NFR BLD MANUAL: 53 %
PLATELET # BLD AUTO: 300 X10E3/UL (ref 150–379)
PLATELET BLD QL SMEAR: ADEQUATE
RBC # BLD AUTO: 4.67 X10E6/UL (ref 4.14–5.8)
RBC MORPH BLD: ABNORMAL
WBC # BLD AUTO: 10.1 X10E3/UL (ref 3.4–10.8)

## 2017-07-26 ENCOUNTER — TELEPHONE (OUTPATIENT)
Dept: RHEUMATOLOGY | Age: 52
End: 2017-07-26

## 2017-10-31 ENCOUNTER — TELEPHONE (OUTPATIENT)
Dept: RHEUMATOLOGY | Age: 52
End: 2017-10-31

## 2017-10-31 NOTE — TELEPHONE ENCOUNTER
Xavier Gunter with [facility] called to check the status of a prior auth for Enbrel.  Please advise [phone]

## 2017-11-03 ENCOUNTER — TELEPHONE (OUTPATIENT)
Dept: RHEUMATOLOGY | Age: 52
End: 2017-11-03

## 2017-11-03 NOTE — TELEPHONE ENCOUNTER
[facility] calling to check on the authorization for the Enbrel Infusion. Phone is [phone].

## 2017-11-09 ENCOUNTER — TELEPHONE (OUTPATIENT)
Dept: RHEUMATOLOGY | Age: 52
End: 2017-11-09

## 2017-11-09 NOTE — TELEPHONE ENCOUNTER
Faxed prior auth request to [facility] for Enbrel [employer] approval. Received fax from [phone] [facility] approved, PA# [id number], good 11/9/2017 - 11/9/2018. Left message for patient Enbrel was approved.

## 2017-12-19 ENCOUNTER — OFFICE VISIT (OUTPATIENT)
Dept: RHEUMATOLOGY | Age: 52
End: 2017-12-19

## 2017-12-19 VITALS
WEIGHT: 270.6 LBS | SYSTOLIC BLOOD PRESSURE: 152 MMHG | TEMPERATURE: 98.3 F | RESPIRATION RATE: 16 BRPM | DIASTOLIC BLOOD PRESSURE: 94 MMHG | HEART RATE: 89 BPM | OXYGEN SATURATION: 96 % | BODY MASS INDEX: 42.38 KG/M2

## 2017-12-19 DIAGNOSIS — M05.742 RHEUMATOID ARTHRITIS INVOLVING BOTH HANDS WITH POSITIVE RHEUMATOID FACTOR (HCC): ICD-10-CM

## 2017-12-19 DIAGNOSIS — M19.011 OSTEOARTHRITIS, LOCALIZED, SHOULDER, RIGHT: Primary | ICD-10-CM

## 2017-12-19 DIAGNOSIS — M05.741 RHEUMATOID ARTHRITIS INVOLVING BOTH HANDS WITH POSITIVE RHEUMATOID FACTOR (HCC): ICD-10-CM

## 2017-12-19 PROBLEM — E66.01 OBESITY, MORBID (HCC): Status: ACTIVE | Noted: 2017-12-19

## 2017-12-19 RX ORDER — DICLOFENAC SODIUM 75 MG/1
75 TABLET, DELAYED RELEASE ORAL 2 TIMES DAILY
Qty: 60 TAB | Refills: 3 | Status: SHIPPED | OUTPATIENT
Start: 2017-12-19 | End: 2018-01-18

## 2017-12-19 NOTE — PROGRESS NOTES
RHEUMATOLOGY PROBLEM LIST AND CHIEF COMPLAINT  1. Rheumatoid Arthritis (2015) - Joint pain/swelling, synovitis in hands, wrists, elbows, rheumatoid nodules on elbows, morning stiffness. Elevated ESR, CCP, RF, CRP. Therapy History:  Previous DMARDs: MTX (Past-09/2016, 10/2016 - 12/2017; AEs- nodulosis), Plaquenil (2015 - 2017)  Current DMARDs: Enbrel (11/2015-current)    2. Shoulder OA and rotator cuff disease    INTERVAL HISTORY  This is a 50 y.o.  male. Today, the patient complains of pain in the joints. Location: knee, thoracic spine  Severity:  6 on a scale of 0-10  Timing: all day   Duration:  4 months  Context/Associated signs and symptoms: The patient complains of intermittent pain in his lower back and gelling of his knees. He believes his nodules have worsened and state that plaquenil and topical tacrolimus provided no relief. He continues with Methotrexate 10 mg weekly and Enbrel 50 mg weekly. He mentions an episode of knee swelling that resolved after he took \"two extra pills of methotrexate. \"    RHEUMATOLOGY REVIEW OF SYSTEMS   Positives as per history  Negatives as follows:  Nimo Oiler:  Denies unexplained persistent fevers or weight change  RESPIRATORY:  No pleuritic pain, exertional dyspnea  CARDIOVASCULAR:  Denies chest pain  GASTRO:   Denies heartburn, abdominal pain, nausea, vomiting, diarrhea  SKIN:    Denies rash   MSK:    No morning stiffness >1 hour, persistent joint swelling, persistent joint pain    PAST MEDICAL HISTORY  Reviewed with patient, significant changes in medical history - no    PHYSICAL EXAM  Blood pressure (!) 152/94, pulse 89, temperature 98.3 °F (36.8 °C), temperature source Oral, resp. rate 16, weight 270 lb 9.6 oz (122.7 kg), SpO2 96 %. GENERAL APPEARANCE: Well-nourished, no acute distress  NECK: No adenopathy  ENT: No oral ulcers  CARDIOVASCULAR: Heart rhythm is regular. No murmur, rub, gallop  CHEST: Normal vesicular breath sounds.  No wheezes, rales, pleural friction rubs  ABDOMINAL: The abdomen is soft and nontender. Bowel sounds are normal  SKIN: No rash, palpable purpura, digital ulcer, abnormal thickening   MUSCULOSKELETAL: Rotator cuff signs on the left have improved  Upper extremities -  2 Nodules noted over left elbow - larger, no synovitis  Lower extremities - B/L Knee crepitus, bony prominence (R>L)    LABS, RADIOLOGY AND PROCEDURES   Previous labs reviewed -Yes    ASSESSMENT  1. Rheumatoid Arthritis (Established problem -  Stable disease) - The patient's nodules are persistent so I want him to stop his methotrexate as it may be contributing to this. His RA is stable so I suspect Enbrel 50 mg weekly is sufficient to manage his symptoms. He should continue with Enbrel and return in 3 months for a follow up. I will check labs today. 2. Drug therapy monitoring for toxicity (methotrexate/Enbrel) - CBC, BUN, Cr, AST, ALT and albumin every 4 months  3. Osteoarthritis - I will give him Medrol 40 mg B/L knees. Weight loss is recommended so I will refer him to a nutritionist.     PLAN  1. Enbrel 50 mg weekly  2. Stop methotrexate for nodulosis   3. Medrol 40 mg B/L Knee  4. Check CBC, CMP, markers of inflammation (ESR, CRP)  5. Obtain labs for hepatitis and tuberculosis in anticipation of possible anti-TNF therapy / biologic therapy. 6. Referral to Nutrition  7. Return in 3 months    Refugio Guillen MD  Adult and Pediatric Rheumatology     [facility], [address] [facility], [address] [facility], Phone [phone], Fax [phone]   E-mail: [email or web address]    Visiting  of Pediatrics    Department of Pediatrics, [facility] of [address] [place] [address], [address] [place] [address], Phone [phone], Fax [phone]  E-mail: [email or web address]    There are no Patient Instructions on file for this visit.     cc:  Gabriela Bacon MD    Written by jeffery Mauro, as dictated by Kristen Gaspar. Margaux Chino M.D.    Nanda Clark NOTE        Chart reviewed for the following:   Jose Manuel RAYA, have reviewed the History, Physical and updated the Allergic reactions for [address] [place] [address] performed immediately prior to start of procedure:   Jose Manuel RAYA, have performed the following reviews on [facility] prior to the start of the procedure:            * Patient was identified by name and date of birth   * Agreement on procedure being performed was verified  * Risks and Benefits explained to the patient  * Procedure site verified and marked as necessary  * Patient was positioned for comfort  * Consent was signed and verified     Time: 4:15      Date of procedure: 12/19/2017    Procedure performed by: Jose Manuel Covarrubias MD    Provider assisted by: none     Patient assisted by: self    How tolerated by patient: tolerated the procedure well with no complications    Post Procedural Pain Scale: 0 - No Hurt    Comments: none    PROCEDURE  After consent was obtained, using sterile technique the bilateral  knee was prepped and Depo-medrol 40 mg and 1ml of lidocaine was then injected and the needle withdrawn. The procedure was well tolerated. The patient is asked to continue to rest for 24 hours before resuming regular activities. It may be more painful for the first 1-2 days. Watch for fever, or increased swelling or persistent pain. Call or return to clinic prn if such symptoms occur.

## 2017-12-19 NOTE — MR AVS SNAPSHOT
Visit Information Date & Time Provider Department Dept. Phone Encounter #  
 12/19/2017  3:30 PM Jose Manuel Covarrubias MD [address] [phone] [id number] Follow-up Instructions Return in about 3 months (around 3/19/2018). Upcoming Health Maintenance Date Due FOBT Q 1 YEAR AGE 50-75 4/1/2017 Influenza Age 5 to Adult 8/1/2017 DTaP/Tdap/Td series (2 - Td) 6/1/2025 Allergies as of 12/19/2017  Review Complete On: 12/19/2017 By: Darlene Edwards LPN No Known Allergies Current Immunizations  Reviewed on 6/1/2015 Name Date Tdap 6/1/2015 Not reviewed this visit You Were Diagnosed With   
  
 Codes Comments Osteoarthritis, localized, shoulder, right    -  Primary ICD-10-CM: M19.011 
ICD-9-CM: 715.31 Rheumatoid arthritis involving both hands with positive rheumatoid factor ([facility]     ICD-10-CM: M05.741, O02.013 ICD-9-CM: 714.0 Vitals BP Pulse Temp Resp Weight(growth percentile) SpO2  
 (!) 152/94 (BP 1 Location: Right arm, BP Patient Position: Sitting) 89 98.3 °F (36.8 °C) (Oral) 16 270 lb 9.6 oz (122.7 kg) 96% BMI Smoking Status 42.38 kg/m2 Current Every Day Smoker BMI and BSA Data Body Mass Index Body Surface Area  
 42.38 kg/m 2 2.41 m 2 Preferred Pharmacy Pharmacy Name Phone Mitzy Recinos [facility] [address] [phone] Your Updated Medication List  
  
   
This list is accurate as of: 12/19/17  4:30 PM.  Always use your most recent med list.  
  
  
  
  
 aspirin delayed-release 81 mg tablet Take 1 Tab by mouth daily. atenolol 25 mg tablet Commonly known as:  TENORMIN Take 1 Tab by mouth nightly. Indications: intentional tremor  
  
 chlorthalidone 25 mg tablet Commonly known as:  HYGROTEN  
TAKE ONE TABLET BY MOUTH DAILY  Indications: hypertension  
  
 cyclobenzaprine 5 mg tablet Commonly known as:  FLEXERIL Take 1-2 Tabs by mouth three (3) times daily as needed for Muscle Spasm(s). diclofenac EC 75 mg EC tablet Commonly known as:  VOLTAREN Take 1 Tab by mouth two (2) times a day for 30 days. etanercept 50 mg/mL (0.98 mL) injection Commonly known as:  ENBREL SURECLICK  
0.05 mL by SubCUTAneous route every seven (7) days. folic acid 1 mg tablet Commonly known as:  Google Take 1 Tab by mouth daily. HYDROcodone-acetaminophen 7.5-325 mg per tablet Commonly known as:  Wayna Glaze Take 1 Tab by mouth every six (6) hours as needed for Pain. Max Daily Amount: 4 Tabs. hydroxychloroquine 200 mg tablet Commonly known as:  PLAQUENIL Take 2 Tabs by mouth two (2) times a day. methotrexate 2.5 mg tablet Commonly known as:  Maliha Hoof Take  by mouth every Sunday. Takes 4 Tablets Every Sunday  
  
 predniSONE 5 mg dose pack Commonly known as:  STERAPRED Take as directed with food x 6 days. See administration instruction per 5mg dose pack  
  
 tacrolimus 0.1 % ointment Commonly known as:  PROTOPIC Apply  to affected area two (2) times a day. Prescriptions Sent to Pharmacy Refills  
 diclofenac EC (VOLTAREN) 75 mg EC tablet 3 Sig: Take 1 Tab by mouth two (2) times a day for 30 days. Class: Normal  
 Pharmacy: [facility] [address] [place] [address] Ph #: [phone] Route: Oral  
  
We Performed the Following C REACTIVE PROTEIN, QT [17222 CPT(R)] CBC+PLATELET+HEM REVIEW [73179 CPT(R)] HCV AB W/RFLX TO MIRANDA [24110 CPT(R)] HEP B SURFACE AG [id number] CPT(R)] HEPATITIS B CORE AB, TOTAL [id number] CPT(R)] HEPATITIS B SURF AB QUANT [id number] CPT(R)] METABOLIC PANEL, COMPREHENSIVE [32519 CPT(R)] QUANTIFERON TB GOLD [[id number] Custom] REFERRAL TO NUTRITION [REF50 Custom] SED RATE (ESR) [id number] CPT(R)] Follow-up Instructions Return in about 3 months (around 3/19/2018). Referral Information Referral ID Referred By Referred To  
  
 [id number] RADHA, 3 [facility] [place], [address] Phone: [phone] Visits Status Start Date End Date 1 New Request 12/19/17 12/19/18 If your referral has a status of pending review or denied, additional information will be sent to support the outcome of this decision. Introducing [facility] & HEALTH SERVICES! Maria Alejandra Chavez introduces [employer] patient portal. Now you can access parts of your medical record, email your doctor's office, and request medication refills online. 1. In your internet browser, go to [email or web address] 2. Click on the First Time User? Click Here link in the Sign In box. You will see the New Member Sign Up page. 3. Enter your [employer] Access Code exactly as it appears below. You will not need to use this code after youve completed the sign-up process. If you do not sign up before the expiration date, you must request a new code. · [employer] Access Code: [email or web address] Expires: 3/19/2018  3:32 PM 
 
4. Enter the last four digits of your Social Security Number (xxxx) and Date of Birth (mm/dd/yyyy) as indicated and click Submit. You will be taken to the next sign-up page. 5. Create a [employer] ID. This will be your [employer] login ID and cannot be changed, so think of one that is secure and easy to remember. 6. Create a [employer] password. You can change your password at any time. 7. Enter your Password Reset Question and Answer. This can be used at a later time if you forget your password. 8. Enter your e-mail address. You will receive e-mail notification when new information is available in [address] 9. Click Sign Up. You can now view and download portions of your medical record. 10. Click the Download Summary menu link to download a portable copy of your medical information. If you have questions, please visit the Frequently Asked Questions section of the Repeatitt website. Remember, Collect is NOT to be used for urgent needs. For medical emergencies, dial 911. Now available from your iPhone and Android! Please provide this summary of care documentation to your next provider. Your primary care clinician is listed as Mindi Alert. If you have any questions after today's visit, please call [phone].

## 2017-12-20 LAB
ALBUMIN SERPL-MCNC: 4.2 G/DL (ref 3.5–5.5)
ALBUMIN/GLOB SERPL: 1.4 {RATIO} (ref 1.2–2.2)
ALP SERPL-CCNC: 62 IU/L (ref 39–117)
ALT SERPL-CCNC: 22 IU/L (ref 0–44)
AST SERPL-CCNC: 21 IU/L (ref 0–40)
BASOPHILS # BLD MANUAL: 0 X10E3/UL (ref 0–0.2)
BASOPHILS NFR BLD MANUAL: 0 %
BILIRUB SERPL-MCNC: 0.3 MG/DL (ref 0–1.2)
BUN SERPL-MCNC: 14 MG/DL (ref 6–24)
BUN/CREAT SERPL: 14 (ref 9–20)
CALCIUM SERPL-MCNC: 9.5 MG/DL (ref 8.7–10.2)
CHLORIDE SERPL-SCNC: 97 MMOL/L (ref 96–106)
CO2 SERPL-SCNC: 23 MMOL/L (ref 18–29)
CREAT SERPL-MCNC: 0.98 MG/DL (ref 0.76–1.27)
CRP SERPL-MCNC: 6.3 MG/L (ref 0–4.9)
DIFFERENTIAL COMMENT, 115260: ABNORMAL
EOSINOPHIL # BLD MANUAL: 0.2 X10E3/UL (ref 0–0.4)
EOSINOPHIL NFR BLD MANUAL: 2 %
ERYTHROCYTE [DISTWIDTH] IN BLOOD BY AUTOMATED COUNT: 15.1 % (ref 12.3–15.4)
ERYTHROCYTE [SEDIMENTATION RATE] IN BLOOD BY WESTERGREN METHOD: 25 MM/HR (ref 0–30)
GLOBULIN SER CALC-MCNC: 3 G/DL (ref 1.5–4.5)
GLUCOSE SERPL-MCNC: 114 MG/DL (ref 65–99)
HBV CORE AB SERPL QL IA: NEGATIVE
HBV SURFACE AB SER-ACNC: <3.1 MIU/ML
HBV SURFACE AG SERPL QL IA: NEGATIVE
HCT VFR BLD AUTO: 43.8 % (ref 37.5–51)
HCV AB S/CO SERPL IA: <0.1 S/CO RATIO (ref 0–0.9)
HCV AB SERPL QL IA: NORMAL
HGB BLD-MCNC: 14.8 G/DL (ref 13–17.7)
LYMPHOCYTES # BLD MANUAL: 3.3 X10E3/UL (ref 0.7–3.1)
LYMPHOCYTES NFR BLD MANUAL: 36 %
MCH RBC QN AUTO: 28.6 PG (ref 26.6–33)
MCHC RBC AUTO-ENTMCNC: 33.8 G/DL (ref 31.5–35.7)
MCV RBC AUTO: 85 FL (ref 79–97)
MONOCYTES # BLD MANUAL: 0.7 X10E3/UL (ref 0.1–0.9)
MONOCYTES NFR BLD MANUAL: 8 %
NEUTROPHILS # BLD MANUAL: 5 X10E3/UL (ref 1.4–7)
NEUTROPHILS NFR BLD MANUAL: 54 %
PLATELET # BLD AUTO: 316 X10E3/UL (ref 150–379)
PLATELET BLD QL SMEAR: ADEQUATE
POTASSIUM SERPL-SCNC: 4 MMOL/L (ref 3.5–5.2)
PROT SERPL-MCNC: 7.2 G/DL (ref 6–8.5)
RBC # BLD AUTO: 5.18 X10E6/UL (ref 4.14–5.8)
RBC MORPH BLD: ABNORMAL
SODIUM SERPL-SCNC: 139 MMOL/L (ref 134–144)
WBC # BLD AUTO: 9.2 X10E3/UL (ref 3.4–10.8)

## 2017-12-22 LAB
ANNOTATION COMMENT IMP: NORMAL
GAMMA INTERFERON BACKGROUND BLD IA-ACNC: 0.03 IU/ML
M TB IFN-G BLD-IMP: NEGATIVE
M TB IFN-G CD4+ BCKGRND COR BLD-ACNC: 0 IU/ML
M TB IFN-G CD4+ T-CELLS BLD-ACNC: 0.03 IU/ML
MITOGEN IGNF BLD-ACNC: 7.73 IU/ML
QUANTIFERON INCUBATION: NORMAL
SERVICE CMNT-IMP: NORMAL

## 2018-03-12 ENCOUNTER — OFFICE VISIT (OUTPATIENT)
Dept: RHEUMATOLOGY | Age: 53
End: 2018-03-12

## 2018-03-12 VITALS
OXYGEN SATURATION: 96 % | HEART RATE: 85 BPM | TEMPERATURE: 98.2 F | BODY MASS INDEX: 43.63 KG/M2 | HEIGHT: 67 IN | WEIGHT: 278 LBS | SYSTOLIC BLOOD PRESSURE: 132 MMHG | DIASTOLIC BLOOD PRESSURE: 83 MMHG | RESPIRATION RATE: 20 BRPM

## 2018-03-12 DIAGNOSIS — M19.011 OSTEOARTHRITIS, LOCALIZED, SHOULDER, RIGHT: ICD-10-CM

## 2018-03-12 DIAGNOSIS — M05.79 SEROPOSITIVE RHEUMATOID ARTHRITIS OF MULTIPLE SITES (HCC): Primary | ICD-10-CM

## 2018-03-12 DIAGNOSIS — M12.812 ROTATOR CUFF ARTHROPATHY, LEFT: ICD-10-CM

## 2018-03-12 RX ORDER — DICLOFENAC SODIUM 75 MG/1
75 TABLET, DELAYED RELEASE ORAL 2 TIMES DAILY
Qty: 60 TAB | Refills: 6 | Status: SHIPPED | OUTPATIENT
Start: 2018-03-12 | End: 2018-10-23 | Stop reason: SDUPTHER

## 2018-03-12 RX ORDER — DICLOFENAC SODIUM 75 MG/1
TABLET, DELAYED RELEASE ORAL
COMMUNITY
End: 2018-03-12 | Stop reason: ALTCHOICE

## 2018-03-12 RX ORDER — HYDROXYCHLOROQUINE SULFATE 200 MG/1
400 TABLET, FILM COATED ORAL 2 TIMES DAILY
Qty: 60 TAB | Refills: 11 | Status: SHIPPED | OUTPATIENT
Start: 2018-03-12 | End: 2018-06-11 | Stop reason: ALTCHOICE

## 2018-03-12 NOTE — MR AVS SNAPSHOT
[address] Fabrice Ceron 74 
[phone] Patient: Yesenia Ward MRN: [id number] :1965 Visit Information Date & Time Provider Department Dept. Phone Encounter #  
 3/12/2018  4:30 PM Doris Corley MD 1 [facility] [address] [facility] [id number] Follow-up Instructions Return in about 3 months (around 2018). Upcoming Health Maintenance Date Due FOBT Q 1 YEAR AGE 50-75 2017 Influenza Age 5 to Adult 2017 DTaP/Tdap/Td series (2 - Td) 2025 Allergies as of 3/12/2018  Review Complete On: 3/12/2018 By: Doris Corley MD  
 No Known Allergies Current Immunizations  Reviewed on 2015 Name Date Tdap 2015 Not reviewed this visit You Were Diagnosed With   
  
 Codes Comments Seropositive rheumatoid arthritis of multiple sites [facility])    -  Primary ICD-10-CM: M05.79 ICD-9-CM: 714.0 Vitals BP Pulse Temp Resp Height(growth percentile) Weight(growth percentile) 132/83 (BP 1 Location: Right arm, BP Patient Position: Sitting) 85 98.2 °F (36.8 °C) 20 5' 7\" (1.702 m) 278 lb (126.1 kg) SpO2 BMI Smoking Status 96% 43.54 kg/m2 Current Every Day Smoker BMI and BSA Data Body Mass Index Body Surface Area 43.54 kg/m 2 2.44 m 2 Preferred Pharmacy Pharmacy Name Phone Italia Rizzo [address] [phone] Your Updated Medication List  
  
   
This list is accurate as of 3/12/18  4:57 PM.  Always use your most recent med list.  
  
  
  
  
 aspirin delayed-release 81 mg tablet Take 1 Tab by mouth daily. atenolol 25 mg tablet Commonly known as:  TENORMIN Take 1 Tab by mouth nightly. Indications: intentional tremor  
  
 chlorthalidone 25 mg tablet Commonly known as:  Basil Adrian  
 TAKE ONE TABLET BY MOUTH DAILY  Indications: hypertension  
  
 cyclobenzaprine 5 mg tablet Commonly known as:  FLEXERIL Take 1-2 Tabs by mouth three (3) times daily as needed for Muscle Spasm(s). diclofenac EC 75 mg EC tablet Commonly known as:  VOLTAREN Take 1 Tab by mouth two (2) times a day.  
  
 etanercept 50 mg/mL (0.98 mL) injection Commonly known as:  ENBREL SURECLICK  
8.27 mL by SubCUTAneous route every seven (7) days. HYDROcodone-acetaminophen 7.5-325 mg per tablet Commonly known as:  Lai Punt Take 1 Tab by mouth every six (6) hours as needed for Pain. Max Daily Amount: 4 Tabs. hydroxychloroquine 200 mg tablet Commonly known as:  PLAQUENIL Take 2 Tabs by mouth two (2) times a day. methotrexate 2.5 mg tablet Commonly known as:  Bradly Spire Take  by mouth every Sunday. Takes 4 Tablets Every Sunday  
  
 predniSONE 5 mg dose pack Commonly known as:  STERAPRED Take as directed with food x 6 days. See administration instruction per 5mg dose pack  
  
 tacrolimus 0.1 % ointment Commonly known as:  PROTOPIC Apply  to affected area two (2) times a day. Prescriptions Sent to Pharmacy Refills  
 hydroxychloroquine (PLAQUENIL) 200 mg tablet 11 Sig: Take 2 Tabs by mouth two (2) times a day. Class: Normal  
 Pharmacy: [address] [place] Ph #: [phone] Route: Oral  
 diclofenac EC (VOLTAREN) 75 mg EC tablet 6 Sig: Take 1 Tab by mouth two (2) times a day. Class: Normal  
 Pharmacy: [address] Ph #: [phone] Route: Oral  
  
We Performed the Following C REACTIVE PROTEIN, QT [84924 CPT(R)] CBC+PLATELET+HEM REVIEW [74654 CPT(R)] METABOLIC PANEL, COMPREHENSIVE [66967 CPT(R)] REFERRAL TO NEUROLOGY [TCW12 Custom] SED RATE (ESR) [id number] CPT(R)] Follow-up Instructions Return in about 3 months (around 6/12/2018). Referral Information Referral ID Referred By Referred To  
  
 [id number] Matt GARCIA, [address] Lesleerasheed Hermandaniela [facility] [place], [address] Phone: [phone] Fax: [phone] Visits Status Start Date End Date 1 New Request 3/12/18 3/12/19 If your referral has a status of pending review or denied, additional information will be sent to support the outcome of this decision. Introducing [facility] & HEALTH SERVICES! Leslee Harrison introduces [employer] patient portal. Now you can access parts of your medical record, email your doctor's office, and request medication refills online. 1. In your internet browser, go to [email or web address] 2. Click on the First Time User? Click Here link in the Sign In box. You will see the New Member Sign Up page. 3. Enter your [employer] Access Code exactly as it appears below. You will not need to use this code after youve completed the sign-up process. If you do not sign up before the expiration date, you must request a new code. · [employer] Access Code: [email or web address] Expires: 3/19/2018  4:32 PM 
 
4. Enter the last four digits of your Social Security Number (xxxx) and Date of Birth (mm/dd/yyyy) as indicated and click Submit. You will be taken to the next sign-up page. 5. Create a USB Promost ID. This will be your [employer] login ID and cannot be changed, so think of one that is secure and easy to remember. 6. Create a USB [employer] password. You can change your password at any time. 7. Enter your Password Reset Question and Answer. This can be used at a later time if you forget your password. 8. Enter your e-mail address. You will receive e-mail notification when new information is available in [address] 9. Click Sign Up. You can now view and download portions of your medical record. 10. Click the Download Summary menu link to download a portable copy of your medical information. If you have questions, please visit the Frequently Asked Questions section of the 1-800-DENTIST website. Remember, 1-800-DENTIST is NOT to be used for urgent needs. For medical emergencies, dial 911. Now available from your iPhone and Android! Please provide this summary of care documentation to your next provider. Your primary care clinician is listed as Ina Antunez. If you have any questions after today's visit, please call [phone].

## 2018-03-12 NOTE — PROGRESS NOTES
RHEUMATOLOGY PROBLEM LIST AND CHIEF COMPLAINT  1. Rheumatoid Arthritis (2015) - Joint pain/swelling, synovitis in hands, wrists, elbows, rheumatoid nodules on elbows, morning stiffness. Elevated ESR, CCP, RF, CRP. Therapy History:  Previous DMARDs: MTX (Past-09/2016, 10/2016 - 12/2017; AEs- nodulosis)   Current DMARDs: Enbrel (11/2015-current), Plaquenil (2015 - 2017, restarted 3/2018)    2. Shoulder OA and rotator cuff disease    INTERVAL HISTORY  This is a 50 y.o.  male. Today, the patient complains of pain in the joints. Location: knee  Severity:  8 on a scale of 0-10  Timing: all day   Duration:  3 months  Context/Associated signs and symptoms: The patient complains of knee pain and swelling, primarily on the right and mainly with activity. He denies stiffness in the small joints. He continues with Enbrel 50 mg weekly. Previous steroid injections have provided relief. He also continues with diclofenac PRN and is pursuing weight loss. He mentions he has been experiencing hand tremors for a while now. RHEUMATOLOGY REVIEW OF SYSTEMS   Positives as per history  Negatives as follows:  Pam Blight:  Denies unexplained persistent fevers or weight change  RESPIRATORY:  No pleuritic pain, exertional dyspnea  CARDIOVASCULAR:  Denies chest pain  GASTRO:   Denies heartburn, abdominal pain, nausea, vomiting, diarrhea  SKIN:    Denies rash   MSK:    No morning stiffness >1 hour, persistent joint swelling, persistent joint pain    PAST MEDICAL HISTORY  Reviewed with patient, significant changes in medical history - no    PHYSICAL EXAM  Blood pressure 132/83, pulse 85, temperature 98.2 °F (36.8 °C), resp. rate 20, height 5' 7\" (1.702 m), weight 278 lb (126.1 kg), SpO2 96 %. GENERAL APPEARANCE: Well-nourished, no acute distress  NECK: No adenopathy  ENT: No oral ulcers  CARDIOVASCULAR: Heart rhythm is regular. No murmur, rub, gallop  CHEST: Normal vesicular breath sounds.  No wheezes, rales, pleural friction rubs  ABDOMINAL: The abdomen is soft and nontender. Bowel sounds are normal  SKIN: No rash, palpable purpura, digital ulcer, abnormal thickening   MUSCULOSKELETAL: Rotator cuff signs on the left have improved  Upper extremities -  2 Nodules noted over left elbow - larger, no synovitis  Lower extremities - B/L Knee crepitus, bony prominence (R>L)    LABS, RADIOLOGY AND PROCEDURES   Previous labs reviewed -Yes    ASSESSMENT  1. Rheumatoid Arthritis (Established problem -  Stable disease) - The patient's knee pain and tremors are unrelated to his disease. The patient should start Plaquenil 400 mg daily for his nodulosis. He should continue with Enbrel 50 mg weekly and return in 3 months for a follow up. I will check labs and refer him to Neurology for evaluation of his tremors. 2. Drug therapy monitoring for toxicity (methotrexate/Enbrel) - CBC, BUN, Cr, AST, ALT and albumin every 4 months  3. Osteoarthritis - I will inject Medrol 40 mg B/L knees. He should continue with diclofenac PRN and weight loss. 4. New medication - Plaquenil - A written summary, as prepared by the [employer] of Rheumatology was provided. The patient was given the opportunity to ask questions, and verbalized understanding of the following: The medication may not improve symptoms for 1-2 months, but it may take up to 6 months before full benefits are experienced. It is very well tolerated, and serious side effects are rare. Common side effects were discussed; nausea and diarrhea, which can improve by taking with food. Less common side effects that were discussed; skin rashes, changes in skin pigment, hair changes, anemia and weakness. Visual changes or loss of vision are also rare; we have dosed accordingly and she will have yearly eye exams. PLAN  1. Enbrel 50 mg weekly  2. Start Plaquenil 400 mg daily  3. Medrol 40 mg B/L Knee  4. Check CBC, CMP, markers of inflammation (ESR, CRP)  5. Referral to Neurology  6.  Return in 3 vickie ROMAN. Zamzam Jones MD  Adult and Pediatric Rheumatology     [facility] Arthritis and [facility], [address], Phone [phone], Fax [phone]   E-mail: [email or web address]    Visiting  of Pediatrics    Department of Pediatrics, [address] [place] [address], [address] [place] [address], Phone [phone], Fax [phone]  E-mail: [email or web address]    There are no Patient Instructions on file for this visit. cc:  Fernando Mendiola MD    Written by jeffery Cho, as dictated by Miya Marie. Zamzam Jones M.D.    [facility] NOTE        Chart reviewed for the following:   I, Janette Kocher, have reviewed the History, Physical and updated the Allergic reactions for [address] [place] [address] performed immediately prior to start of procedure:   I, Janette Kocher, have performed the following reviews on [address] [place] [address] prior to the start of the procedure:            * Patient was identified by name and date of birth   * Agreement on procedure being performed was verified  * Risks and Benefits explained to the patient  * Procedure site verified and marked as necessary  * Patient was positioned for comfort  * Consent was signed and verified     Time: 4:55      Date of procedure: 3/14/2018    Procedure performed by: Janette Kocher, MD    Provider assisted by: none    Patient assisted by: self    How tolerated by patient: tolerated the procedure well with no complications    Post Procedural Pain Scale: 0 - No Hurt    Comments: none    PROCEDURE  After consent was obtained, using sterile technique the bilateral  knee was prepped and Depo-medrol 40 mg and 1ml of lidocaine was then injected and the needle withdrawn. The procedure was well tolerated. The patient is asked to continue to rest for 24 hours before resuming regular activities.   It may be more painful for the first 1-2 days. Watch for fever, or increased swelling or persistent pain. Call or return to clinic prn if such symptoms occur.

## 2018-03-12 NOTE — PROGRESS NOTES
Chief Complaint   Patient presents with    Joint Pain     1. Have you been to the ER, urgent care clinic since your last visit? Hospitalized since your last visit? {Yes when where and reason for visit:20441}    2. Have you seen or consulted any other health care providers outside of the [address] [place] [address] since your last visit? Include any pap smears or colon screening.  {Yes when where and reason for visit:20441}  Visit Vitals    /83 (BP 1 Location: Right arm, BP Patient Position: Sitting)    Pulse 85    Temp 98.2 °F (36.8 °C)    Resp 20    Ht 5' 7\" (1.702 m)    Wt 278 lb (126.1 kg)    SpO2 96%    BMI 43.54 kg/m2

## 2018-03-13 LAB
ALBUMIN SERPL-MCNC: 4.1 G/DL (ref 3.5–5.5)
ALBUMIN/GLOB SERPL: 1.3 {RATIO} (ref 1.2–2.2)
ALP SERPL-CCNC: 61 IU/L (ref 39–117)
ALT SERPL-CCNC: 31 IU/L (ref 0–44)
AST SERPL-CCNC: 27 IU/L (ref 0–40)
BASOPHILS # BLD MANUAL: 0.1 X10E3/UL (ref 0–0.2)
BASOPHILS NFR BLD MANUAL: 1 %
BILIRUB SERPL-MCNC: 0.4 MG/DL (ref 0–1.2)
BUN SERPL-MCNC: 15 MG/DL (ref 6–24)
BUN/CREAT SERPL: 15 (ref 9–20)
CALCIUM SERPL-MCNC: 9.1 MG/DL (ref 8.7–10.2)
CHLORIDE SERPL-SCNC: 99 MMOL/L (ref 96–106)
CO2 SERPL-SCNC: 30 MMOL/L (ref 18–29)
CREAT SERPL-MCNC: 1.02 MG/DL (ref 0.76–1.27)
CRP SERPL-MCNC: 4.6 MG/L (ref 0–4.9)
DIFFERENTIAL COMMENT, 115260: ABNORMAL
EOSINOPHIL # BLD MANUAL: 0.2 X10E3/UL (ref 0–0.4)
EOSINOPHIL NFR BLD MANUAL: 2 %
ERYTHROCYTE [DISTWIDTH] IN BLOOD BY AUTOMATED COUNT: 14.8 % (ref 12.3–15.4)
ERYTHROCYTE [SEDIMENTATION RATE] IN BLOOD BY WESTERGREN METHOD: 5 MM/HR (ref 0–30)
GFR SERPLBLD CREATININE-BSD FMLA CKD-EPI: 85 ML/MIN/1.73
GFR SERPLBLD CREATININE-BSD FMLA CKD-EPI: 99 ML/MIN/1.73
GLOBULIN SER CALC-MCNC: 3.1 G/DL (ref 1.5–4.5)
GLUCOSE SERPL-MCNC: 83 MG/DL (ref 65–99)
HCT VFR BLD AUTO: 41.8 % (ref 37.5–51)
HGB BLD-MCNC: 14.2 G/DL (ref 13–17.7)
LYMPHOCYTES # BLD MANUAL: 3.5 X10E3/UL (ref 0.7–3.1)
LYMPHOCYTES NFR BLD MANUAL: 38 %
MCH RBC QN AUTO: 28.6 PG (ref 26.6–33)
MCHC RBC AUTO-ENTMCNC: 34 G/DL (ref 31.5–35.7)
MCV RBC AUTO: 84 FL (ref 79–97)
MONOCYTES # BLD MANUAL: 0.7 X10E3/UL (ref 0.1–0.9)
MONOCYTES NFR BLD MANUAL: 7 %
NEUTROPHILS # BLD MANUAL: 4.8 X10E3/UL (ref 1.4–7)
NEUTROPHILS NFR BLD MANUAL: 52 %
PLATELET # BLD AUTO: 307 X10E3/UL (ref 150–379)
PLATELET BLD QL SMEAR: ADEQUATE
POTASSIUM SERPL-SCNC: 3.6 MMOL/L (ref 3.5–5.2)
PROT SERPL-MCNC: 7.2 G/DL (ref 6–8.5)
RBC # BLD AUTO: 4.97 X10E6/UL (ref 4.14–5.8)
RBC MORPH BLD: ABNORMAL
SODIUM SERPL-SCNC: 142 MMOL/L (ref 134–144)
WBC # BLD AUTO: 9.3 X10E3/UL (ref 3.4–10.8)

## 2018-03-19 ENCOUNTER — TELEPHONE (OUTPATIENT)
Dept: RHEUMATOLOGY | Age: 53
End: 2018-03-19

## 2018-03-19 NOTE — TELEPHONE ENCOUNTER
[facility] called because they are not in contract with the patients insurance so their prescription for Enbrel has to go through a specialty pharmacy. A call back number is [phone] and you can speak to anyone.

## 2018-03-20 ENCOUNTER — TELEPHONE (OUTPATIENT)
Dept: RHEUMATOLOGY | Age: 53
End: 2018-03-20

## 2018-03-20 RX ORDER — LIDOCAINE HYDROCHLORIDE 10 MG/ML
2 INJECTION INFILTRATION; PERINEURAL ONCE
Qty: 1 VIAL | Refills: 0
Start: 2018-03-20 | End: 2018-03-20

## 2018-03-20 RX ORDER — LIDOCAINE HYDROCHLORIDE 10 MG/ML
2 INJECTION INFILTRATION; PERINEURAL ONCE
Qty: 1 VIAL | Refills: 0
Start: 2018-03-20 | End: 2018-03-20 | Stop reason: ALTCHOICE

## 2018-03-20 RX ORDER — METHYLPREDNISOLONE ACETATE 40 MG/ML
40 INJECTION, SUSPENSION INTRA-ARTICULAR; INTRALESIONAL; INTRAMUSCULAR; SOFT TISSUE ONCE
Qty: 2 ML | Refills: 0
Start: 2018-03-20 | End: 2018-03-20 | Stop reason: ALTCHOICE

## 2018-03-20 RX ORDER — ETANERCEPT 50 MG/ML
50 SOLUTION SUBCUTANEOUS
Qty: 3.92 ML | Refills: 0 | Status: SHIPPED | OUTPATIENT
Start: 2018-03-20 | End: 2018-03-23 | Stop reason: SDUPTHER

## 2018-03-20 NOTE — TELEPHONE ENCOUNTER
Patients wife called because the prescription for Earlyne Boozer needs to be changed to [facility] because of the insurance. Storie's number is [phone].

## 2018-03-22 ENCOUNTER — TELEPHONE (OUTPATIENT)
Dept: RHEUMATOLOGY | Age: 53
End: 2018-03-22

## 2018-03-22 DIAGNOSIS — I10 HTN, GOAL BELOW 130/80: ICD-10-CM

## 2018-03-22 DIAGNOSIS — Z72.0 TOBACCO ABUSE: ICD-10-CM

## 2018-03-22 DIAGNOSIS — G25.2 INTENTION TREMOR: ICD-10-CM

## 2018-03-22 DIAGNOSIS — R73.03 PREDIABETES: ICD-10-CM

## 2018-03-22 NOTE — TELEPHONE ENCOUNTER
----- Message from Morro Fraga sent at 3/21/2018  4:54 PM EDT -----  Regarding: Dr. Sidney Ybarra, with [facility], is requesting a Rx for Enbrel, [phone]. Thi's best contact number [phone].

## 2018-03-22 NOTE — TELEPHONE ENCOUNTER
----- Message from Tim Finney sent at 3/21/2018  4:54 PM EDT -----  Regarding: Dr. Claire Tomlin, with [facility], is requesting a Rx for Enbrel, [phone]. Thi's best contact number [phone].

## 2018-03-23 RX ORDER — ETANERCEPT 50 MG/ML
50 SOLUTION SUBCUTANEOUS
Qty: 12 EACH | Refills: 1 | Status: SHIPPED | OUTPATIENT
Start: 2018-03-23 | End: 2018-03-23 | Stop reason: SDUPTHER

## 2018-03-23 RX ORDER — ETANERCEPT 50 MG/ML
50 SOLUTION SUBCUTANEOUS
Qty: 12 EACH | Refills: 1 | Status: SHIPPED | OUTPATIENT
Start: 2018-03-23 | End: 2018-04-22

## 2018-03-26 RX ORDER — CHLORTHALIDONE 25 MG/1
TABLET ORAL
Qty: 30 TAB | Refills: 5 | Status: SHIPPED | OUTPATIENT
Start: 2018-03-26 | End: 2018-06-19 | Stop reason: SDUPTHER

## 2018-04-06 ENCOUNTER — DOCUMENTATION ONLY (OUTPATIENT)
Dept: RHEUMATOLOGY | Age: 53
End: 2018-04-06

## 2018-06-11 ENCOUNTER — OFFICE VISIT (OUTPATIENT)
Dept: RHEUMATOLOGY | Age: 53
End: 2018-06-11

## 2018-06-11 VITALS
SYSTOLIC BLOOD PRESSURE: 125 MMHG | HEIGHT: 67 IN | OXYGEN SATURATION: 95 % | DIASTOLIC BLOOD PRESSURE: 87 MMHG | RESPIRATION RATE: 18 BRPM | TEMPERATURE: 98.5 F | HEART RATE: 76 BPM | WEIGHT: 275.2 LBS | BODY MASS INDEX: 43.19 KG/M2

## 2018-06-11 DIAGNOSIS — M05.79 SEROPOSITIVE RHEUMATOID ARTHRITIS OF MULTIPLE SITES (HCC): Primary | ICD-10-CM

## 2018-06-11 RX ORDER — HYDROXYCHLOROQUINE SULFATE 200 MG/1
400 TABLET, FILM COATED ORAL DAILY
Qty: 60 TAB | Refills: 6 | Status: SHIPPED | OUTPATIENT
Start: 2018-06-11 | End: 2018-07-12 | Stop reason: SDUPTHER

## 2018-06-11 NOTE — PROGRESS NOTES
Exam Room #5  Linda Santos is a 48 y.o. male  Chief Complaint   Patient presents with    Joint Pain     3 month follow up     1. Have you been to the ER, urgent care clinic since your last visit? Hospitalized since your last visit? Yes, [facility]/2018 for pain on the right side of his face, was treated for an ear infection. 2. Have you seen or consulted any other health care providers outside of the [address] [place] [address] since your last visit? Include any pap smears or colon screening.  No    Visit Vitals    /87 (BP 1 Location: Right arm, BP Patient Position: Sitting)    Pulse 76    Temp 98.5 °F (36.9 °C) (Tympanic)    Resp 18    Ht 5' 7\" (1.702 m)    Wt 275 lb 3.2 oz (124.8 kg)    SpO2 95%    BMI 43.1 kg/m2

## 2018-06-11 NOTE — MR AVS SNAPSHOT
[address] [facility] [address] [place] [address] 
[phone] Patient: Ninfa Domingo MRN: [id number] :1965 Visit Information Date & Time Provider Department Dept. Phone Encounter #  
 2018  2:40 PM Farrukh Cifuentes MD [address] [phone] [id number] Follow-up Instructions Return in about 4 months (around 10/11/2018). Upcoming Health Maintenance Date Due FOBT Q 1 YEAR AGE 50-75 2017 Influenza Age 5 to Adult 2018 DTaP/Tdap/Td series (2 - Td) 2025 Allergies as of 2018  Review Complete On: 2018 By: Farrukh Cifuentes MD  
 No Known Allergies Current Immunizations  Reviewed on 2015 Name Date Tdap 2015 Not reviewed this visit You Were Diagnosed With   
  
 Codes Comments Seropositive rheumatoid arthritis of multiple sites [facility])    -  Primary ICD-10-CM: M05.79 ICD-9-CM: 714.0 Vitals BP Pulse Temp Resp Height(growth percentile) 125/87 (BP 1 Location: Right arm, BP Patient Position: Sitting) 76 98.5 °F (36.9 °C) (Tympanic) 18 5' 7\" (1.702 m) Weight(growth percentile) SpO2 BMI Smoking Status 275 lb 3.2 oz (124.8 kg) 95% 43.1 kg/m2 Current Every Day Smoker BMI and BSA Data Body Mass Index Body Surface Area  
 43.1 kg/m 2 2.43 m 2 Preferred Pharmacy Pharmacy Name Phone [id number] [phone] Your Updated Medication List  
  
   
This list is accurate as of 18  3:24 PM.  Always use your most recent med list.  
  
  
  
  
 aspirin delayed-release 81 mg tablet Take 1 Tab by mouth daily. atenolol 25 mg tablet Commonly known as:  TENORMIN Take 1 Tab by mouth nightly. Indications: intentional tremor  
  
 chlorthalidone 25 mg tablet Commonly known as:  Radha Howell  
 TAKE ONE TABLET BY MOUTH DAILY  
  
 cyclobenzaprine 5 mg tablet Commonly known as:  FLEXERIL Take 1-2 Tabs by mouth three (3) times daily as needed for Muscle Spasm(s). diclofenac EC 75 mg EC tablet Commonly known as:  VOLTAREN Take 1 Tab by mouth two (2) times a day. HYDROcodone-acetaminophen 7.5-325 mg per tablet Commonly known as:  Verlee Shack Take 1 Tab by mouth every six (6) hours as needed for Pain. Max Daily Amount: 4 Tabs. hydroxychloroquine 200 mg tablet Commonly known as:  PLAQUENIL Take 2 Tabs by mouth daily. methotrexate 2.5 mg tablet Commonly known as:  Terris Anuradha Take  by mouth every Sunday. Takes 4 Tablets Every Sunday  
  
 tacrolimus 0.1 % ointment Commonly known as:  PROTOPIC Apply  to affected area two (2) times a day. Prescriptions Sent to Pharmacy Refills  
 hydroxychloroquine (PLAQUENIL) 200 mg tablet 6 Sig: Take 2 Tabs by mouth daily. Class: Normal  
 Pharmacy: [address] #: [id number] Route: Oral  
  
Follow-up Instructions Return in about 4 months (around 10/11/2018). Introducing [facility] HE[facility]! Deshawn Eaton introduces [employer] patient portal. Now you can access parts of your medical record, email your doctor's office, and request medication refills online. 1. In your internet browser, go to [email or web address] 2. Click on the First Time User? Click Here link in the Sign In box. You will see the New Member Sign Up page. 3. Enter your [employer] Access Code exactly as it appears below. You will not need to use this code after youve completed the sign-up process. If you do not sign up before the expiration date, you must request a new code. · [employer] Access Code: [email or web address] Expires: 9/9/2018  3:24 PM 
 
4.  Enter the last four digits of your Social Security Number (xxxx) and Date of Birth (mm/dd/yyyy) as indicated and click Submit. You will be taken to the next sign-up page. 5. Create a [employer] ID. This will be your [employer] login ID and cannot be changed, so think of one that is secure and easy to remember. 6. Create a [employer] password. You can change your password at any time. 7. Enter your Password Reset Question and Answer. This can be used at a later time if you forget your password. 8. Enter your e-mail address. You will receive e-mail notification when new information is available in [address] 9. Click Sign Up. You can now view and download portions of your medical record. 10. Click the Download Summary menu link to download a portable copy of your medical information. If you have questions, please visit the Frequently Asked Questions section of the [employer] website. Remember, [employer] is NOT to be used for urgent needs. For medical emergencies, dial 911. Now available from your iPhone and Android! Please provide this summary of care documentation to your next provider. Your primary care clinician is listed as Basil Albright. If you have any questions after today's visit, please call [phone].

## 2018-06-11 NOTE — PROGRESS NOTES
RHEUMATOLOGY PROBLEM LIST AND CHIEF COMPLAINT  1. Rheumatoid Arthritis (2015) - Joint pain/swelling, synovitis in hands, wrists, elbows, rheumatoid nodules on elbows, morning stiffness. Elevated ESR, CCP, RF, CRP. Therapy History:  Previous DMARDs: MTX (Past-09/2016, 10/2016 - 12/2017; AEs- nodulosis)   Current DMARDs: Enbrel (11/2015-current), Plaquenil (2015 - 2017, restarted 3/2018)    2. Shoulder OA and rotator cuff disease    INTERVAL HISTORY  This is a 48 y.o.  male. Today, the patient complains of pain in the joints. Location: shoulder, elbow  Severity:  0 on a scale of 0-10  Timing: none at this time   Duration:  3 months  Context/Associated signs and symptoms: The patient continues to complain of stiffness of his left shoulder and elbow and some stiffness after inactivity. He mentions some stiffness by his right wrist and states his rheumatoid nodules persist. He denies any joint pain. Previous steroids injection of his knees provided relief. He continues with weight loss. He did not start Plaquenil; he states he did not receive it due to a change in insurance/pharmacy. He continues with Enbrel 50 mg weekly. RHEUMATOLOGY REVIEW OF SYSTEMS   Positives as per history  Negatives as follows:  Marlon Arlen:  Denies unexplained persistent fevers or weight change  RESPIRATORY:  No pleuritic pain, exertional dyspnea  CARDIOVASCULAR:  Denies chest pain  GASTRO:   Denies heartburn, abdominal pain, nausea, vomiting, diarrhea  SKIN:    Denies rash   MSK:    No morning stiffness >1 hour, persistent joint swelling, persistent joint pain    PAST MEDICAL HISTORY  Reviewed with patient, significant changes in medical history - no    PHYSICAL EXAM  Blood pressure 125/87, pulse 76, temperature 98.5 °F (36.9 °C), temperature source Tympanic, resp. rate 18, height 5' 7\" (1.702 m), weight 275 lb 3.2 oz (124.8 kg), SpO2 95 %.   GENERAL APPEARANCE: Well-nourished, no acute distress  NECK: No adenopathy  ENT: No oral ulcers  CARDIOVASCULAR: Heart rhythm is regular. No murmur, rub, gallop  CHEST: Normal vesicular breath sounds. No wheezes, rales, pleural friction rubs  ABDOMINAL: The abdomen is soft and nontender. Bowel sounds are normal  SKIN: No rash, palpable purpura, digital ulcer, abnormal thickening   MUSCULOSKELETAL: Rotator cuff signs on the left have improved  Upper extremities -  2 Nodules noted over left elbow - larger, no synovitis  Lower extremities - B/L Knee crepitus, bony prominence (R>L), scar over right knee from previous surgery. LABS, RADIOLOGY AND PROCEDURES   Previous labs reviewed -Yes    ASSESSMENT  1. Rheumatoid Arthritis (Established problem -  Stable disease) - He did not start Plaquenil 400 mg daily and continues to have rheumatoid nodules over his left elbow. I will resend Plaquenil. We will switch his Enbrel to the [employer] in the future. He should continue with Enbrel 50 mg SQ weekly and return in 4 months for a follow up. 2. Drug therapy monitoring for toxicity (methotrexate/Enbrel) - CBC, BUN, Cr, AST, ALT and albumin every 4 months  3. Osteoarthritis - He should continue with diclofenac PRN and weight loss. 4. New medication - Plaquenil - A written summary, as prepared by the [employer] of Rheumatology was provided. The patient was given the opportunity to ask questions, and verbalized understanding of the following: The medication may not improve symptoms for 1-2 months, but it may take up to 6 months before full benefits are experienced. It is very well tolerated, and serious side effects are rare. Common side effects were discussed; nausea and diarrhea, which can improve by taking with food. Less common side effects that were discussed; skin rashes, changes in skin pigment, hair changes, anemia and weakness. Visual changes or loss of vision are also rare; we have dosed accordingly and she will have yearly eye exams. PLAN  1. Enbrel 50 mg weekly  2. Plaquenil 400 mg daily  3. Return in 4 months    Refugio Minor MD  Adult and Pediatric Rheumatology     [facility] [address], [address] [place] [address], Phone [phone], Fax [phone]   E-mail: [email or web address]    Visiting  of Pediatrics    Department of Pediatrics, [facility] of [address] [place] [address], [address] [place] [address], Phone [phone], Fax [phone]  E-mail: [email or web address]    There are no Patient Instructions on file for this visit. cc:  David Walker MD    Written by jeffery Hoang, as dictated by Audrey Bey.  Froilan Minor M.D.

## 2018-06-19 ENCOUNTER — TELEPHONE (OUTPATIENT)
Dept: RHEUMATOLOGY | Age: 53
End: 2018-06-19

## 2018-06-19 DIAGNOSIS — R73.03 PREDIABETES: ICD-10-CM

## 2018-06-19 DIAGNOSIS — Z72.0 TOBACCO ABUSE: ICD-10-CM

## 2018-06-19 DIAGNOSIS — I10 HTN, GOAL BELOW 130/80: ICD-10-CM

## 2018-06-19 DIAGNOSIS — G25.2 INTENTION TREMOR: ICD-10-CM

## 2018-06-19 RX ORDER — CHLORTHALIDONE 25 MG/1
TABLET ORAL
Qty: 90 TAB | Refills: 0 | Status: SHIPPED | OUTPATIENT
Start: 2018-06-19 | End: 2018-09-15 | Stop reason: SDUPTHER

## 2018-06-19 NOTE — TELEPHONE ENCOUNTER
----- Message from Kamla Delgado MD sent at 6/19/2018  9:02 AM EDT -----  Give him enbrel support number  ----- Message -----     From: Carlos Clark LPN     Sent: 3/46/2845   3:59 PM       To: Kamla Delgado MD    Give him Enbrel Support number [phone].  ----- Message -----     From: Kamla Delgado MD     Sent: 6/11/2018   3:55 PM       To: Carlos Clark LPN    Not sure  ----- Message -----     From: Carlos Clark LPN     Sent: 3/08/1549   3:26 PM       To: Kamla Delgado MD    Does he have the co pay card? [facility] approved his Enbrel.   ----- Message -----     From: Kamla Delgado MD     Sent: 6/11/2018   3:17 PM       To: Carlos Clark LPN    His enbrel costs a lot. Right now he is getting assistance through an agency. He doesn't know which one. Should we do safety net.

## 2018-06-19 NOTE — TELEPHONE ENCOUNTER
Spoke to pt informed pt to call Enbrel Support to apply for pt assistance, pt was given the number to Enbrel support pt verbally acknowledged understanding

## 2018-07-12 ENCOUNTER — TELEPHONE (OUTPATIENT)
Dept: RHEUMATOLOGY | Age: 53
End: 2018-07-12

## 2018-07-12 RX ORDER — HYDROXYCHLOROQUINE SULFATE 200 MG/1
400 TABLET, FILM COATED ORAL DAILY
Qty: 180 TAB | Refills: 6 | Status: SHIPPED | OUTPATIENT
Start: 2018-07-12 | End: 2018-07-13 | Stop reason: SDUPTHER

## 2018-07-12 NOTE — TELEPHONE ENCOUNTER
----- Message from [facility] sent at 7/12/2018 11:39 AM EDT -----  Regarding: Dr. Manas Goldstein called requesting a refill for Hydroxychloroquine 200mg for a 30 day supply instead of a 90 day due to cost. [facility] pharmacy.  Best contact [phone]

## 2018-07-13 RX ORDER — HYDROXYCHLOROQUINE SULFATE 200 MG/1
400 TABLET, FILM COATED ORAL DAILY
Qty: 60 TAB | Refills: 6 | Status: SHIPPED | OUTPATIENT
Start: 2018-07-13 | End: 2018-09-27 | Stop reason: SDUPTHER

## 2018-07-17 ENCOUNTER — TELEPHONE (OUTPATIENT)
Dept: RHEUMATOLOGY | Age: 53
End: 2018-07-17

## 2018-07-17 RX ORDER — ETANERCEPT 50 MG/ML
SOLUTION SUBCUTANEOUS
COMMUNITY
Start: 2018-05-17 | End: 2018-07-17 | Stop reason: SDUPTHER

## 2018-07-17 RX ORDER — ETANERCEPT 50 MG/ML
50 SOLUTION SUBCUTANEOUS
Qty: 12 EACH | Refills: 1 | Status: SHIPPED | OUTPATIENT
Start: 2018-07-17 | End: 2018-10-29 | Stop reason: ALTCHOICE

## 2018-07-17 NOTE — TELEPHONE ENCOUNTER
Wife of patient called because the patient is running out of Enbrel and needs refill. Best contact is [phone].

## 2018-08-01 RX ORDER — FOLIC ACID 1 MG/1
TABLET ORAL
Refills: 3 | COMMUNITY
Start: 2018-06-24 | End: 2018-08-01 | Stop reason: SDUPTHER

## 2018-08-01 RX ORDER — FOLIC ACID 1 MG/1
TABLET ORAL
Qty: 1 TAB | Refills: 3 | Status: SHIPPED | OUTPATIENT
Start: 2018-08-01 | End: 2022-01-21 | Stop reason: ALTCHOICE

## 2018-08-23 ENCOUNTER — TELEPHONE (OUTPATIENT)
Dept: RHEUMATOLOGY | Age: 53
End: 2018-08-23

## 2018-08-23 RX ORDER — ETANERCEPT 50 MG/ML
50 SOLUTION SUBCUTANEOUS
Qty: 3 EACH | Refills: 0 | Status: SHIPPED | COMMUNITY
Start: 2018-08-23 | End: 2018-09-22

## 2018-08-23 NOTE — TELEPHONE ENCOUNTER
Spoke to pt wife informed pt wife per Dr Jerry Dean that we have samples of Enbrel for the pt, pt wife verbally acknowledged understanding

## 2018-09-15 DIAGNOSIS — Z72.0 TOBACCO ABUSE: ICD-10-CM

## 2018-09-15 DIAGNOSIS — I10 HTN, GOAL BELOW 130/80: ICD-10-CM

## 2018-09-15 DIAGNOSIS — G25.2 INTENTION TREMOR: ICD-10-CM

## 2018-09-15 DIAGNOSIS — R73.03 PREDIABETES: ICD-10-CM

## 2018-09-17 RX ORDER — CHLORTHALIDONE 25 MG/1
TABLET ORAL
Qty: 90 TAB | Refills: 0 | Status: SHIPPED | OUTPATIENT
Start: 2018-09-17 | End: 2019-07-17 | Stop reason: SDUPTHER

## 2018-09-27 ENCOUNTER — TELEPHONE (OUTPATIENT)
Dept: RHEUMATOLOGY | Age: 53
End: 2018-09-27

## 2018-09-27 RX ORDER — HYDROXYCHLOROQUINE SULFATE 200 MG/1
400 TABLET, FILM COATED ORAL DAILY
Qty: 60 TAB | Refills: 6 | Status: SHIPPED | OUTPATIENT
Start: 2018-09-27 | End: 2019-08-30 | Stop reason: SDUPTHER

## 2018-09-27 NOTE — TELEPHONE ENCOUNTER
----- Message from Lamonte Willis sent at 9/27/2018 11:13 AM EDT -----  Regarding: Dr. Wilson Gey: [phone]  Pt is requesting refill on Hydroxychloroquine 200mg and Emberl and uses the [facility] pharmacy on file. Best contact [phone].

## 2018-09-27 NOTE — TELEPHONE ENCOUNTER
----- Message from [employer] sent at 9/27/2018 11:04 AM EDT -----  Regarding: Dr. Dwayne Willams(pt's wife) is requesting for medication \"Hydrochlorothiazide\" for pt sent to [address] [place] [address] [phone]. Allison Morgan best contact [phone].

## 2018-10-16 DIAGNOSIS — Z72.0 TOBACCO ABUSE: ICD-10-CM

## 2018-10-16 DIAGNOSIS — G25.2 INTENTION TREMOR: ICD-10-CM

## 2018-10-16 DIAGNOSIS — I10 HTN, GOAL BELOW 130/80: ICD-10-CM

## 2018-10-16 DIAGNOSIS — R73.03 PREDIABETES: ICD-10-CM

## 2018-10-18 RX ORDER — CHLORTHALIDONE 25 MG/1
TABLET ORAL
Qty: 90 TAB | Refills: 0 | Status: SHIPPED | OUTPATIENT
Start: 2018-10-18 | End: 2019-03-20 | Stop reason: SDUPTHER

## 2018-10-24 RX ORDER — DICLOFENAC SODIUM 75 MG/1
75 TABLET, DELAYED RELEASE ORAL 2 TIMES DAILY
Qty: 60 TAB | Refills: 6 | Status: SHIPPED | OUTPATIENT
Start: 2018-10-24 | End: 2019-07-17 | Stop reason: ALTCHOICE

## 2018-10-29 ENCOUNTER — OFFICE VISIT (OUTPATIENT)
Dept: RHEUMATOLOGY | Age: 53
End: 2018-10-29

## 2018-10-29 VITALS
BODY MASS INDEX: 44.95 KG/M2 | DIASTOLIC BLOOD PRESSURE: 99 MMHG | OXYGEN SATURATION: 95 % | RESPIRATION RATE: 16 BRPM | HEART RATE: 79 BPM | TEMPERATURE: 99.1 F | SYSTOLIC BLOOD PRESSURE: 144 MMHG | WEIGHT: 287 LBS

## 2018-10-29 DIAGNOSIS — M05.741 RHEUMATOID ARTHRITIS INVOLVING BOTH HANDS WITH POSITIVE RHEUMATOID FACTOR (HCC): Primary | ICD-10-CM

## 2018-10-29 DIAGNOSIS — M19.011 OSTEOARTHRITIS, LOCALIZED, SHOULDER, RIGHT: ICD-10-CM

## 2018-10-29 DIAGNOSIS — M12.812 ROTATOR CUFF ARTHROPATHY, LEFT: ICD-10-CM

## 2018-10-29 DIAGNOSIS — M05.79 SEROPOSITIVE RHEUMATOID ARTHRITIS OF MULTIPLE SITES (HCC): ICD-10-CM

## 2018-10-29 DIAGNOSIS — M05.742 RHEUMATOID ARTHRITIS INVOLVING BOTH HANDS WITH POSITIVE RHEUMATOID FACTOR (HCC): Primary | ICD-10-CM

## 2018-10-29 RX ORDER — ETANERCEPT 50 MG/ML
50 SOLUTION SUBCUTANEOUS
Qty: 3.92 ML | Refills: 6 | Status: SHIPPED | OUTPATIENT
Start: 2018-10-29 | End: 2018-11-08 | Stop reason: ALTCHOICE

## 2018-10-29 NOTE — PROGRESS NOTES
RHEUMATOLOGY PROBLEM LIST AND CHIEF COMPLAINT  1. Rheumatoid Arthritis (2015) - Joint pain/swelling, synovitis in hands, wrists, elbows, rheumatoid nodules on elbows, morning stiffness. Elevated ESR, CCP, RF, CRP. Therapy History:  Previous DMARDs: MTX (Past-09/2016, 10/2016 - 12/2017; AEs- nodulosis)   Current DMARDs: Enbrel (11/2015-current), Plaquenil (2015 - 2017, 3/2018-current)    2. Shoulder OA and rotator cuff disease    INTERVAL HISTORY  This is a 48 y.o.  male. Today, the patient complains of no pain in the joints. Location: none  Severity:  0 on a scale of 0-10  Timing: none at this time   Duration:  3 months  Context/Associated signs and symptoms: The patient complains of persistent symptoms in his left shoulder and elbow. Symptoms are stiffness after long periods of inactivity such as car rides and after sleeping. He also complains of foot and knee pain after activity. His rheumatoid nodules persist over his left elbow. He continues with diet changes. He continues with medications Enbrel 50 mg weekly and Plaquenil 400 mg daily. His symptoms return if he misses his Enbrel injection for a week. RHEUMATOLOGY REVIEW OF SYSTEMS   Positives as per history  Negatives as follows:  Bravo Null:  Denies unexplained persistent fevers or weight change  RESPIRATORY:  No pleuritic pain, exertional dyspnea  CARDIOVASCULAR:  Denies chest pain  GASTRO:   Denies heartburn, abdominal pain, nausea, vomiting, diarrhea  SKIN:    Denies rash   MSK:    No morning stiffness >1 hour, persistent joint swelling    PAST MEDICAL HISTORY  Reviewed with patient, significant changes in medical history - no    PHYSICAL EXAM  Blood pressure (!) 144/99, pulse 79, temperature 99.1 °F (37.3 °C), temperature source Oral, resp. rate 16, weight 287 lb (130.2 kg), SpO2 95 %. GENERAL APPEARANCE: Well-nourished, no acute distress  NECK: No adenopathy  ENT: No oral ulcers  CARDIOVASCULAR: Heart rhythm is regular.  No murmur, rub, gallop  CHEST: Normal vesicular breath sounds. No wheezes, rales, pleural friction rubs  ABDOMINAL: The abdomen is soft and nontender. Bowel sounds are normal  SKIN: No rash, palpable purpura, digital ulcer, abnormal thickening   MUSCULOSKELETAL: Rotator cuff signs on the left - unchanged  Upper extremities -  2 Nodules noted over left elbow - unchanged, no synovitis  Lower extremities - B/L Knee crepitus, bony prominence (R>L), scar over right knee from previous surgery. LABS, RADIOLOGY AND PROCEDURES   Previous labs reviewed -Yes    ASSESSMENT  1. Rheumatoid Arthritis (Established problem -  Stable disease) - The patient is doing well on Plaquenil 400 mg daily and Enbrel 50 mg weekly. He should continue with these medications. His left elbow nodules persist. The majority of his pain today is from OA. He should return in 4 months for a follow up. I will check labs today. 2. Drug therapy monitoring for toxicity (methotrexate/Enbrel) - CBC, BUN, Cr, AST, ALT and albumin every 4 months  3. Osteoarthritis - The majority of his pain today is from OA. He should continue with diclofenac PRN and weight loss. PLAN  1. Enbrel 50 mg weekly  2. Plaquenil 400 mg daily  3. Check CBC, CMP, markers of inflammation (ESR, CRP)  4. Return in 4 months    Refugio Nunez MD  Adult and Pediatric Rheumatology     [address] [place] [address], Phone [phone], Fax [phone]   E-mail: [email or web address]    Visiting  of Pediatrics    Department of Pediatrics, [facility] of [address] [place] [address], [address] [place] [address], Phone [phone], Fax [phone]  E-mail: [email or web address]    There are no Patient Instructions on file for this visit. cc:  Jassi Milton MD    Written by jeffery Muniz, as dictated by Tio Cosme.  Shira Nunez M.D.

## 2018-10-31 LAB
ALBUMIN SERPL-MCNC: 3.9 G/DL (ref 3.5–5.5)
ALBUMIN/GLOB SERPL: 1.3 {RATIO} (ref 1.2–2.2)
ALP SERPL-CCNC: 86 IU/L (ref 39–117)
ALT SERPL-CCNC: 24 IU/L (ref 0–44)
AST SERPL-CCNC: 19 IU/L (ref 0–40)
BASOPHILS # BLD MANUAL: 0 X10E3/UL (ref 0–0.2)
BASOPHILS NFR BLD MANUAL: 0 %
BILIRUB SERPL-MCNC: 0.3 MG/DL (ref 0–1.2)
BUN SERPL-MCNC: 18 MG/DL (ref 6–24)
BUN/CREAT SERPL: 16 (ref 9–20)
CALCIUM SERPL-MCNC: 9.3 MG/DL (ref 8.7–10.2)
CHLORIDE SERPL-SCNC: 101 MMOL/L (ref 96–106)
CO2 SERPL-SCNC: 24 MMOL/L (ref 20–29)
CREAT SERPL-MCNC: 1.12 MG/DL (ref 0.76–1.27)
CRP SERPL-MCNC: 12.9 MG/L (ref 0–4.9)
DIFFERENTIAL COMMENT, 115260: ABNORMAL
EOSINOPHIL # BLD MANUAL: 0.1 X10E3/UL (ref 0–0.4)
EOSINOPHIL NFR BLD MANUAL: 1 %
ERYTHROCYTE [DISTWIDTH] IN BLOOD BY AUTOMATED COUNT: 14.8 % (ref 12.3–15.4)
ERYTHROCYTE [SEDIMENTATION RATE] IN BLOOD BY WESTERGREN METHOD: 30 MM/HR (ref 0–30)
GLOBULIN SER CALC-MCNC: 3.1 G/DL (ref 1.5–4.5)
GLUCOSE SERPL-MCNC: 105 MG/DL (ref 65–99)
HCT VFR BLD AUTO: 42.4 % (ref 37.5–51)
HGB BLD-MCNC: 14.3 G/DL (ref 13–17.7)
LYMPHOCYTES # BLD MANUAL: 3.8 X10E3/UL (ref 0.7–3.1)
LYMPHOCYTES NFR BLD MANUAL: 39 %
MCH RBC QN AUTO: 28.6 PG (ref 26.6–33)
MCHC RBC AUTO-ENTMCNC: 33.7 G/DL (ref 31.5–35.7)
MCV RBC AUTO: 85 FL (ref 79–97)
MONOCYTES # BLD MANUAL: 0.5 X10E3/UL (ref 0.1–0.9)
MONOCYTES NFR BLD MANUAL: 5 %
NEUTROPHILS # BLD MANUAL: 5.1 X10E3/UL (ref 1.4–7)
NEUTROPHILS NFR BLD MANUAL: 53 %
PLATELET # BLD AUTO: 274 X10E3/UL (ref 150–379)
PLATELET BLD QL SMEAR: ADEQUATE
POTASSIUM SERPL-SCNC: 3.8 MMOL/L (ref 3.5–5.2)
PROT SERPL-MCNC: 7 G/DL (ref 6–8.5)
RBC # BLD AUTO: 5 X10E6/UL (ref 4.14–5.8)
RBC MORPH BLD: ABNORMAL
SODIUM SERPL-SCNC: 139 MMOL/L (ref 134–144)
WBC # BLD AUTO: 9.7 X10E3/UL (ref 3.4–10.8)

## 2018-11-08 RX ORDER — ETANERCEPT 50 MG/ML
50 SOLUTION SUBCUTANEOUS
Qty: 3.92 ML | Refills: 6 | Status: SHIPPED | OUTPATIENT
Start: 2018-11-08 | End: 2018-12-08

## 2019-03-04 ENCOUNTER — OFFICE VISIT (OUTPATIENT)
Dept: RHEUMATOLOGY | Age: 54
End: 2019-03-04

## 2019-03-04 VITALS
TEMPERATURE: 98.1 F | BODY MASS INDEX: 45.89 KG/M2 | RESPIRATION RATE: 16 BRPM | OXYGEN SATURATION: 94 % | WEIGHT: 293 LBS | SYSTOLIC BLOOD PRESSURE: 135 MMHG | DIASTOLIC BLOOD PRESSURE: 87 MMHG | HEART RATE: 87 BPM

## 2019-03-04 DIAGNOSIS — M05.742 RHEUMATOID ARTHRITIS INVOLVING BOTH HANDS WITH POSITIVE RHEUMATOID FACTOR (HCC): ICD-10-CM

## 2019-03-04 DIAGNOSIS — M05.79 SEROPOSITIVE RHEUMATOID ARTHRITIS OF MULTIPLE SITES (HCC): Primary | ICD-10-CM

## 2019-03-04 DIAGNOSIS — M05.741 RHEUMATOID ARTHRITIS INVOLVING BOTH HANDS WITH POSITIVE RHEUMATOID FACTOR (HCC): ICD-10-CM

## 2019-03-04 RX ORDER — ETANERCEPT 50 MG/ML
SOLUTION SUBCUTANEOUS
COMMUNITY
Start: 2019-02-06 | End: 2019-07-31 | Stop reason: SDUPTHER

## 2019-03-04 RX ORDER — ETANERCEPT 50 MG/ML
50 SOLUTION SUBCUTANEOUS
Qty: 4 EACH | Refills: 0 | Status: SHIPPED | COMMUNITY
Start: 2019-03-04 | End: 2019-04-03

## 2019-03-04 NOTE — PROGRESS NOTES
Chief Complaint   Patient presents with    Joint Pain     1. Have you been to the ER, urgent care clinic since your last visit? Hospitalized since your last visit? No    2. Have you seen or consulted any other health care providers outside of the [address] [place] [address] since your last visit? Include any pap smears or colon screening.  No

## 2019-03-04 NOTE — PROGRESS NOTES
RHEUMATOLOGY PROBLEM LIST AND CHIEF COMPLAINT  1. Rheumatoid Arthritis (2015) - Joint pain/swelling, synovitis in hands, wrists, elbows, rheumatoid nodules on elbows, morning stiffness. Elevated ESR, CCP, RF, CRP. Therapy History:  Previous DMARDs: MTX (Past-09/2016, 10/2016 - 12/2017; AEs- nodulosis)   Current DMARDs: Enbrel (11/2015-current), Plaquenil (2015 - 2017, 3/2018-current)    2. Shoulder OA and rotator cuff disease    INTERVAL HISTORY  This is a 48 y.o.  male. Today, the patient complains of pain in the joints. Location: knee, feet  Severity:  8 on a scale of 0-10  Timing: all day  Duration:  5 months  Context/Associated signs and symptoms: The patient complains of stiffness in his feet and his knees for a few minutes in the morning. He continues with Enbrel 50 mg weekly and Plaquenil 400 mg daily. He states he has some insurance issues with the medication, and is only able to afford them for 6-7 months at a time.      RHEUMATOLOGY REVIEW OF SYSTEMS   Positives as per history  Negatives as follows:  [place]:  Denies unexplained persistent fevers, weight change, chronic fatigue  HEAD/EYES:   Denies eye redness, blurry vision or sudden loss of vision, dry eyes, HA, temporal artery pain  ENT:    Denies oral/nasal ulcers, recurrent sinus infections, dry mouth  RESPIRATORY:  No pleuritic pain, history of pleural effusions, hemoptysis, exertional dyspnea  CARDIOVASCULAR:  Denies chest pain, history of pericardial effusions  GASTRO:   Denies heartburn, esophageal dysmotility, abdominal pain, nausea, vomiting, diarrhea, blood in the stool  HEMATOLOGIC:  No easy bruising, purpura, swollen lymph nodes  SKIN:    Denies alopecia, ulcers, nodules, sun sensitivity, unexplained persistent rash   VASCULAR:   Denies edema, cyanosis, raynaud phenomenon  NEUROLOGIC:  Denies specific muscle weakness, paresthesias   PSYCHIATRIC:  No sleep disturbance / snoring, depression, anxiety  MSK:    No morning stiffness >1 hour, SI joint pain, persistent joint swelling, persistent joint pain    PAST MEDICAL HISTORY  Reviewed with patient, significant changes in medical history - no    PHYSICAL EXAM  Blood pressure 135/87, pulse 87, temperature 98.1 °F (36.7 °C), temperature source Oral, resp. rate 16, weight 293 lb (132.9 kg), SpO2 94 %. GENERAL APPEARANCE: Well-nourished, no acute distress  NECK: No adenopathy  ENT: No oral ulcers  CARDIOVASCULAR: Heart rhythm is regular. No murmur, rub, gallop  CHEST: Normal vesicular breath sounds. No wheezes, rales, pleural friction rubs  ABDOMINAL: The abdomen is soft and nontender. Bowel sounds are normal  SKIN: No rash, palpable purpura, digital ulcer, abnormal thickening   MUSCULOSKELETAL: Rotator cuff signs on the left - unchanged  Upper extremities -  2 Nodules noted over left elbow - unchanged, no synovitis. Some synovial thickening in the right 3rd digit, MCP & PIP. Lower extremities - B/L Knee crepitus, bony prominence (R>L), scar over right knee from previous surgery. LABS, RADIOLOGY AND PROCEDURES   Previous labs reviewed -Yes    ASSESSMENT  1. Rheumatoid Arthritis (Established problem -  Stable disease) - His disease continues to be stable. We will give him Enbrel samples today. We will look into the Safety Net program to make sure his Enbrel more affordable. We will continue with his medications Plaquenil 400 mg daily and Enbrel 50 mg weekly. He should return in 3 months for a follow up. I will check labs today. 2. Drug therapy monitoring for toxicity (methotrexate/Enbrel) - CBC, BUN, Cr, AST, ALT and albumin every 4 months  3. Osteoarthritis - His complaints today are from OA. He should continue with diclofenac PRN and weight loss. PLAN  1. Enbrel 50 mg weekly; samples given today  2. Plaquenil 400 mg daily  3. Check CBC, CMP, markers of inflammation (ESR, CRP)  4. Return in 3 months    Refugio Ramirez MD  Adult and Pediatric Rheumatology     [address] [facility], [address] [place] [address], Phone [phone], Fax [phone]   E-mail: [email or web address]    Visiting  of Pediatrics    Department of Pediatrics, [facility] of [address] [place] [address], [address] [place] [address], Phone [phone], Fax [phone]  E-mail: [email or web address]    There are no Patient Instructions on file for this visit. cc:  Reginaldo Coe MD    Written by jeffery Zapata, as dictated by Luisito Bender.  Ifrah Bocanegra M.D

## 2019-03-06 LAB
ALBUMIN SERPL-MCNC: 4.4 G/DL (ref 3.5–5.5)
ALBUMIN/GLOB SERPL: 1.6 {RATIO} (ref 1.2–2.2)
ALP SERPL-CCNC: 56 IU/L (ref 39–117)
ALT SERPL-CCNC: 38 IU/L (ref 0–44)
AST SERPL-CCNC: 24 IU/L (ref 0–40)
BASOPHILS # BLD MANUAL: 0 X10E3/UL (ref 0–0.2)
BASOPHILS NFR BLD MANUAL: 0 %
BILIRUB SERPL-MCNC: 0.3 MG/DL (ref 0–1.2)
BUN SERPL-MCNC: 20 MG/DL (ref 6–24)
BUN/CREAT SERPL: 18 (ref 9–20)
CALCIUM SERPL-MCNC: 9.5 MG/DL (ref 8.7–10.2)
CHLORIDE SERPL-SCNC: 98 MMOL/L (ref 96–106)
CO2 SERPL-SCNC: 25 MMOL/L (ref 20–29)
CREAT SERPL-MCNC: 1.11 MG/DL (ref 0.76–1.27)
CRP SERPL-MCNC: 2.5 MG/L (ref 0–4.9)
DIFFERENTIAL COMMENT, 115260: ABNORMAL
EOSINOPHIL # BLD MANUAL: 0.2 X10E3/UL (ref 0–0.4)
EOSINOPHIL NFR BLD MANUAL: 2 %
ERYTHROCYTE [DISTWIDTH] IN BLOOD BY AUTOMATED COUNT: 15.2 % (ref 12.3–15.4)
ERYTHROCYTE [SEDIMENTATION RATE] IN BLOOD BY WESTERGREN METHOD: 26 MM/HR (ref 0–30)
GLOBULIN SER CALC-MCNC: 2.7 G/DL (ref 1.5–4.5)
GLUCOSE SERPL-MCNC: 89 MG/DL (ref 65–99)
HCT VFR BLD AUTO: 41.8 % (ref 37.5–51)
HGB BLD-MCNC: 14.2 G/DL (ref 13–17.7)
LYMPHOCYTES # BLD MANUAL: 4.4 X10E3/UL (ref 0.7–3.1)
LYMPHOCYTES NFR BLD MANUAL: 45 %
MCH RBC QN AUTO: 28.1 PG (ref 26.6–33)
MCHC RBC AUTO-ENTMCNC: 34 G/DL (ref 31.5–35.7)
MCV RBC AUTO: 83 FL (ref 79–97)
MONOCYTES # BLD MANUAL: 0.5 X10E3/UL (ref 0.1–0.9)
MONOCYTES NFR BLD MANUAL: 5 %
NEUTROPHILS # BLD MANUAL: 4.7 X10E3/UL (ref 1.4–7)
NEUTROPHILS NFR BLD MANUAL: 48 %
PLATELET # BLD AUTO: 309 X10E3/UL (ref 150–379)
PLATELET BLD QL SMEAR: ADEQUATE
POTASSIUM SERPL-SCNC: 3.6 MMOL/L (ref 3.5–5.2)
PROT SERPL-MCNC: 7.1 G/DL (ref 6–8.5)
RBC # BLD AUTO: 5.05 X10E6/UL (ref 4.14–5.8)
RBC MORPH BLD: ABNORMAL
SODIUM SERPL-SCNC: 140 MMOL/L (ref 134–144)
WBC # BLD AUTO: 9.7 X10E3/UL (ref 3.4–10.8)

## 2019-03-12 ENCOUNTER — TELEPHONE (OUTPATIENT)
Dept: RHEUMATOLOGY | Age: 54
End: 2019-03-12

## 2019-03-12 NOTE — TELEPHONE ENCOUNTER
----- Message from Char Marino LPN sent at 1/59/8183  3:14 PM EDT -----  Regarding: FW: Dr. Rachell Willis: [phone]      ----- Message -----  From: Mary Banda RN  Sent: 3/12/2019   2:27 PM  To: Char Marino LPN  Subject: FW: [facility]. [facility]                              ----- Message -----  From: Nathan Muñiz  Sent: 3/12/2019   2:22 PM  To: [facility] Nurse Pool  Subject: Dr. Devika Colin (daughter) called to get the name of assistance program for (Enbre) for pt.

## 2019-03-12 NOTE — TELEPHONE ENCOUNTER
Returned call to Mr. Rodríguezlupe's resident, and spoke with Екатерина Rangel, and informed her she is not on HIPPA, and I could not give her any patient information. Offered the [employer] website for information.

## 2019-03-20 DIAGNOSIS — G25.2 INTENTION TREMOR: ICD-10-CM

## 2019-03-20 DIAGNOSIS — Z72.0 TOBACCO ABUSE: ICD-10-CM

## 2019-03-20 DIAGNOSIS — R73.03 PREDIABETES: ICD-10-CM

## 2019-03-20 DIAGNOSIS — I10 HTN, GOAL BELOW 130/80: ICD-10-CM

## 2019-03-20 RX ORDER — CHLORTHALIDONE 25 MG/1
TABLET ORAL
Qty: 90 TAB | Refills: 0 | Status: SHIPPED | OUTPATIENT
Start: 2019-03-20 | End: 2019-06-25 | Stop reason: SDUPTHER

## 2019-06-25 DIAGNOSIS — R73.03 PREDIABETES: ICD-10-CM

## 2019-06-25 DIAGNOSIS — Z72.0 TOBACCO ABUSE: ICD-10-CM

## 2019-06-25 DIAGNOSIS — G25.2 INTENTION TREMOR: ICD-10-CM

## 2019-06-25 DIAGNOSIS — I10 HTN, GOAL BELOW 130/80: ICD-10-CM

## 2019-06-26 RX ORDER — CHLORTHALIDONE 25 MG/1
TABLET ORAL
Qty: 90 TAB | Refills: 0 | Status: SHIPPED | OUTPATIENT
Start: 2019-06-26 | End: 2019-07-17 | Stop reason: ALTCHOICE

## 2019-07-17 ENCOUNTER — OFFICE VISIT (OUTPATIENT)
Dept: FAMILY MEDICINE CLINIC | Age: 54
End: 2019-07-17

## 2019-07-17 VITALS
RESPIRATION RATE: 20 BRPM | HEART RATE: 68 BPM | DIASTOLIC BLOOD PRESSURE: 85 MMHG | HEIGHT: 67 IN | BODY MASS INDEX: 45.53 KG/M2 | TEMPERATURE: 97.6 F | OXYGEN SATURATION: 96 % | WEIGHT: 290.1 LBS | SYSTOLIC BLOOD PRESSURE: 124 MMHG

## 2019-07-17 DIAGNOSIS — R73.03 PREDIABETES: ICD-10-CM

## 2019-07-17 DIAGNOSIS — Z23 ENCOUNTER FOR IMMUNIZATION: ICD-10-CM

## 2019-07-17 DIAGNOSIS — M25.552 HIP PAIN, ACUTE, LEFT: ICD-10-CM

## 2019-07-17 DIAGNOSIS — Z00.00 ROUTINE MEDICAL EXAM: Primary | ICD-10-CM

## 2019-07-17 DIAGNOSIS — Z72.0 TOBACCO ABUSE: ICD-10-CM

## 2019-07-17 DIAGNOSIS — Z12.5 PROSTATE CANCER SCREENING: ICD-10-CM

## 2019-07-17 DIAGNOSIS — G25.2 INTENTION TREMOR: ICD-10-CM

## 2019-07-17 DIAGNOSIS — E78.2 MIXED HYPERLIPIDEMIA: ICD-10-CM

## 2019-07-17 DIAGNOSIS — I10 HTN, GOAL BELOW 130/80: ICD-10-CM

## 2019-07-17 DIAGNOSIS — Z12.11 SCREEN FOR COLON CANCER: ICD-10-CM

## 2019-07-17 DIAGNOSIS — E66.01 OBESITY, MORBID (HCC): ICD-10-CM

## 2019-07-17 RX ORDER — CHLORTHALIDONE 25 MG/1
TABLET ORAL
Refills: 0 | COMMUNITY
Start: 2019-06-26 | End: 2020-05-14 | Stop reason: SDUPTHER

## 2019-07-17 RX ORDER — CHLORTHALIDONE 25 MG/1
TABLET ORAL
Qty: 90 TAB | Refills: 1
Start: 2019-07-17 | End: 2019-07-17 | Stop reason: ALTCHOICE

## 2019-07-17 RX ORDER — TRAMADOL HYDROCHLORIDE 50 MG/1
50 TABLET ORAL
Qty: 20 TAB | Refills: 0 | Status: SHIPPED | OUTPATIENT
Start: 2019-07-17 | End: 2019-07-22

## 2019-07-17 RX ORDER — DEXAMETHASONE SODIUM PHOSPHATE 10 MG/ML
10 INJECTION INTRAMUSCULAR; INTRAVENOUS ONCE
Qty: 1 ML | Refills: 0
Start: 2019-07-17 | End: 2019-07-17

## 2019-07-17 RX ORDER — METHYLPREDNISOLONE 4 MG/1
TABLET ORAL
Qty: 1 DOSE PACK | Refills: 0 | Status: SHIPPED | OUTPATIENT
Start: 2019-07-17 | End: 2020-01-30 | Stop reason: ALTCHOICE

## 2019-07-17 RX ORDER — INDOMETHACIN 50 MG/1
50 CAPSULE ORAL 3 TIMES DAILY
Qty: 18 CAP | Refills: 0 | Status: SHIPPED | OUTPATIENT
Start: 2019-07-17 | End: 2019-07-23

## 2019-07-17 NOTE — PROGRESS NOTES
HISTORY OF PRESENT ILLNESS  Kim Williamson is a 48 y.o. male. HPI present for low back pain letter for work hypertension and complete physical exam  S/p RA  Last time seen the Rheumatologist one yr ago, Currently taking Methotrexate, hydroxychloroquine, as per Rheumatologist, and folic acid, patient also getting injection therapy Enbrel once weekly unfortunately stating that he is been dealing with severe sharp pain secondary to the heavy lifting at work and unable to attend job does not like to make any mistake at his work current pain medication is not helping him  Today coming in moaning in pain, stating that he ran out of his meds and needs refill for all his pain meds, and unable to sleep, and not having any quality of life at this time, fortunately patient stopped his tobacco abuse it has been 11 months since the last time he smoked a cigarette for which he is happy with his decision he states that he smoked for last 30yrs,   HTN  Today pt present for Bp check and++Compliancy w/ the bp meds, having had the low salt diet ,  has been active, patien does not obtain the bp at home , today the pt denies Chest Pain, has no legs swelling no lightheadedness,      Current Outpatient Medications   Medication Sig Dispense Refill    OTHER Embriel injection one per week      hydroxychloroquine (PLAQUENIL) 200 mg tablet Take 2 Tabs by mouth daily. 60 Tab 6    chlorthalidone (HYGROTEN) 25 mg tablet TAKE 1 TABLET BY MOUTH EVERY DAY 90 Tab 0    ENBREL SURECLICK 50 mg/mL (3.64 mL) injection       diclofenac EC (VOLTAREN) 75 mg EC tablet Take 1 Tab by mouth two (2) times a day. 60 Tab 6    chlorthalidone (HYGROTEN) 25 mg tablet TAKE 1 TABLET BY MOUTH EVERY DAY 90 Tab 0    folic acid (FOLVITE) 1 mg tablet TAKE 1 TABLET BY MOUTH DAILY 1 Tab 3    methotrexate (RHEUMATREX) 2.5 mg tablet Take  by mouth every Sunday.  Takes 4 Tablets Every Sunday      tacrolimus (PROTOPIC) 0.1 % ointment Apply  to affected area two (2) times a day. 30 g 6    cyclobenzaprine (FLEXERIL) 5 mg tablet Take 1-2 Tabs by mouth three (3) times daily as needed for Muscle Spasm(s). 15 Tab 0    HYDROcodone-acetaminophen (NORCO) 7.5-325 mg per tablet Take 1 Tab by mouth every six (6) hours as needed for Pain. Max Daily Amount: 4 Tabs. 20 Tab 0    aspirin delayed-release 81 mg tablet Take 1 Tab by mouth daily. 30 Tab 11    atenolol (TENORMIN) 25 mg tablet Take 1 Tab by mouth nightly.  Indications: intentional tremor 30 Tab 6     No Known Allergies  Past Medical History:   Diagnosis Date    Cellulitis and abscess of trunk 7/31/2014    Chronic back pain greater than 3 months duration 7/31/2014    Family history of prostate cancer 7/31/2014    Hyperhydrosis disorder 7/31/2014    Hypertension     Intention tremor 12/9/2015    Prediabetes 6/25/2014    RA (rheumatoid arthritis) ([facility] [address] 7/31/2014    Rash of perineum 6/25/2014    Tinea manus 1/19/2016    Tinea pedis of both feet 1/19/2016    Tobacco abuse 6/25/2014     Past Surgical History:   Procedure Laterality Date    HX ORTHOPAEDIC      right leg and knee     Family History   Problem Relation Age of Onset    Diabetes Mother     Hypertension Mother     Stroke Mother     Diabetes Father     Hypertension Father      Social History     Tobacco Use    Smoking status: Former Smoker     Packs/day: 1.00    Smokeless tobacco: Never Used   Substance Use Topics    Alcohol use: Yes     Comment: occasionally      Lab Results   Component Value Date/Time    Hemoglobin A1c 5.5 01/19/2016 12:26 PM    Hemoglobin A1c 5.7 (H) 06/25/2014 12:19 PM    Glucose 89 03/04/2019 05:19 PM    Creatinine 1.11 03/04/2019 05:19 PM      Lab Results   Component Value Date/Time    Cholesterol, total 127 08/10/2016 04:40 PM    HDL Cholesterol 56 08/10/2016 04:40 PM     Lab Results   Component Value Date/Time    GFR est non-AA 76 03/04/2019 05:19 PM    GFR est AA 88 03/04/2019 05:19 PM    Creatinine 1.11 03/04/2019 05:19 PM    BUN 20 03/04/2019 05:19 PM    Sodium 140 03/04/2019 05:19 PM    Potassium 3.6 03/04/2019 05:19 PM    Chloride 98 03/04/2019 05:19 PM    CO2 25 03/04/2019 05:19 PM     Lab Results   Component Value Date/Time    Prostate Specific Ag 0.6 07/31/2014 03:39 PM        Review of Systems   Constitutional: Negative for chills and fever. HENT: Negative for ear pain and nosebleeds. Eyes: Negative for blurred vision, pain and discharge. Respiratory: Negative for shortness of breath. Cardiovascular: Negative for chest pain and leg swelling. Gastrointestinal: Negative for constipation, diarrhea, nausea and vomiting. Genitourinary: Negative for frequency. Musculoskeletal: Negative for joint pain. Skin: Negative for itching and rash. Neurological: Negative for headaches. Psychiatric/Behavioral: Negative for depression. The patient is not nervous/anxious. Physical Exam   Constitutional: He is oriented to person, place, and time. He appears well-developed and well-nourished. HENT:   Head: Normocephalic and atraumatic. Mouth/Throat: No oropharyngeal exudate. Eyes: Conjunctivae and EOM are normal.   Neck: Normal range of motion. Neck supple. Cardiovascular: Normal rate, regular rhythm and normal heart sounds. No murmur heard. Pulmonary/Chest: Effort normal and breath sounds normal. No respiratory distress. Abdominal: Soft. Bowel sounds are normal. He exhibits no distension. There is no rebound. Musculoskeletal: He exhibits tenderness. He exhibits no edema. Neurological: He is alert and oriented to person, place, and time. Skin: Skin is warm. No erythema. Psychiatric: He has a normal mood and affect. His behavior is normal.   Nursing note and vitals reviewed. ASSESSMENT and PLAN  Diagnoses and all orders for this visit:    1. Routine medical exam  -     [place] HANDLG&/OR CONVEY OF SPEC FOR TR OFFICE TO LAB    2. Screen for colon cancer    3.  Prostate cancer screening  -     PSA, DIAGNOSTIC (PROSTATE SPECIFIC AG)  -     CT HANDLG&/OR CONVEY OF SPEC FOR TR OFFICE TO LAB    4. Mixed hyperlipidemia  -     CBC W/O DIFF  -     PSA, DIAGNOSTIC (PROSTATE SPECIFIC AG)  -     TSH 3RD GENERATION  -     HEMOGLOBIN A1C WITH EAG  -     METABOLIC PANEL, COMPREHENSIVE  -     LIPID PANEL  -     REFERRAL TO GASTROENTEROLOGY  -     traMADol (ULTRAM) 50 mg tablet; Take 1 Tab by mouth every eight (8) hours as needed for Pain for up to 5 days. Max Daily Amount: 150 mg.  -     CT HANDLG&/OR CONVEY OF SPEC FOR TR OFFICE TO LAB    5. Encounter for immunization  -     PNEUMOCOCCAL POLYSACCHARIDE VACCINE, 23-VALENT, ADULT OR IMMUNOSUPPRESSED PT DOSE,    6. Prediabetes  -     CBC W/O DIFF  -     PSA, DIAGNOSTIC (PROSTATE SPECIFIC AG)  -     TSH 3RD GENERATION  -     HEMOGLOBIN A1C WITH EAG  -     METABOLIC PANEL, COMPREHENSIVE  -     LIPID PANEL  -     REFERRAL TO GASTROENTEROLOGY  -     traMADol (ULTRAM) 50 mg tablet; Take 1 Tab by mouth every eight (8) hours as needed for Pain for up to 5 days. Max Daily Amount: 150 mg.  -     CT HANDLG&/OR CONVEY OF SPEC FOR TR OFFICE TO LAB    7. HTN, goal below 130/80  -     CBC W/O DIFF  -     PSA, DIAGNOSTIC (PROSTATE SPECIFIC AG)  -     TSH 3RD GENERATION  -     HEMOGLOBIN A1C WITH EAG  -     METABOLIC PANEL, COMPREHENSIVE  -     LIPID PANEL  -     REFERRAL TO GASTROENTEROLOGY  -     traMADol (ULTRAM) 50 mg tablet; Take 1 Tab by mouth every eight (8) hours as needed for Pain for up to 5 days. Max Daily Amount: 150 mg.  -     CT HANDLG&/OR CONVEY OF SPEC FOR TR OFFICE TO LAB    8. Tobacco abuse  -     CBC W/O DIFF  -     PSA, DIAGNOSTIC (PROSTATE SPECIFIC AG)  -     TSH 3RD GENERATION  -     HEMOGLOBIN A1C WITH EAG  -     METABOLIC PANEL, COMPREHENSIVE  -     LIPID PANEL  -     REFERRAL TO GASTROENTEROLOGY  -     traMADol (ULTRAM) 50 mg tablet; Take 1 Tab by mouth every eight (8) hours as needed for Pain for up to 5 days. Max Daily Amount: 150 mg.    9.  Intention tremor  -     CBC W/O DIFF  -     PSA, DIAGNOSTIC (PROSTATE SPECIFIC AG)  -     TSH 3RD GENERATION  -     HEMOGLOBIN A1C WITH EAG  -     METABOLIC PANEL, COMPREHENSIVE  -     LIPID PANEL  -     REFERRAL TO GASTROENTEROLOGY  -     traMADol (ULTRAM) 50 mg tablet; Take 1 Tab by mouth every eight (8) hours as needed for Pain for up to 5 days. Max Daily Amount: 150 mg. 10. Hip pain, acute, left  -     DEXAMETHASONE SODIUM PHOSPHATE INJECTION 1 MG  -     [place] THER/PROPH/DIAG INJECTION, SUBCUT/IM    11. Obesity, morbid ([address]    Other orders  -     methylPREDNISolone (MEDROL DOSEPACK) 4 mg tablet; As directed for 6 days one package  -     indomethacin (INDOCIN) 50 mg capsule; Take 1 Cap by mouth three (3) times daily for 6 days. -     dexamethasone, PF, (DECADRON) 10 mg/mL injection; 1 mL by IntraMUSCular route once for 1 dose. Subjective:   Bob Szymanski is a 48 y.o. male presenting for his annual checkup.  Last wellness exam was few years ago Not Up todate w/ all vaccination, last tetanus vaccine is in 2015 today egardless the pt was offered all the recommended vaccination as preventive measure but patient has been declining most so far,  no blood in the stool no tarry stool, still driving at this time,      Medications causing fall and the risk for future fall detailed for the pt today,  the depression at this age addressed pt with improvig interests and enjoy to do things, not anxious not depressed,  pt at this visit, is physically functional and nicely independent and walks w/out mobility device and  mentaly is very functional,  very Alert and oriented, unfortunately,  BMI for pt's age is into morbid obesity state,    the abnl BMI r/w'd and information given, bp was screened for abnormality slightly high but overall not bad for the pt's age,,    last colonoscopy was never,   No family hx of breast cancer in a male  Last psa exam was never    no family hx of colon cancer,   patient is sexaully active,  ++physically active,  compliant w/ meds, ++Rf needed for today for currentt meds. Review of Systems    Constitutional: no chills and fever, m obese,     HENT: no ear head pain and nosebleeds. No blurred vision, pain and discharge. Respiratory: no shortness of breath, wheezing cough nor sore throat. Cardiovascular: Has no chest pain, leg swelling ,and racing heart . Gastrointestinal: No constipation, diarrhea, nausea and vomiting. Genitourinary: No frequency. Musculoskeletal: Negative for joint pain. Skin: no itching, pimples or acne rash. Neurological: Negative for dizziness, no tremors  Psychiatric/Behavioral: Negative for depression has normal interest to do things and not depressed the patient is not nervous/anxious. Specific concerns today: back pain  Did heavy lifting driving, with RA stopped cigs,    Current Outpatient Medications   Medication Sig Dispense Refill    OTHER Embriel injection one per week      hydroxychloroquine (PLAQUENIL) 200 mg tablet Take 2 Tabs by mouth daily. 60 Tab 6    chlorthalidone (HYGROTEN) 25 mg tablet TAKE 1 TABLET BY MOUTH EVERY DAY 90 Tab 0    ENBREL SURECLICK 50 mg/mL (6.09 mL) injection       diclofenac EC (VOLTAREN) 75 mg EC tablet Take 1 Tab by mouth two (2) times a day. 60 Tab 6    chlorthalidone (HYGROTEN) 25 mg tablet TAKE 1 TABLET BY MOUTH EVERY DAY 90 Tab 0    folic acid (FOLVITE) 1 mg tablet TAKE 1 TABLET BY MOUTH DAILY 1 Tab 3    methotrexate (RHEUMATREX) 2.5 mg tablet Take  by mouth every Sunday. Takes 4 Tablets Every Sunday      tacrolimus (PROTOPIC) 0.1 % ointment Apply  to affected area two (2) times a day. 30 g 6    cyclobenzaprine (FLEXERIL) 5 mg tablet Take 1-2 Tabs by mouth three (3) times daily as needed for Muscle Spasm(s). 15 Tab 0    HYDROcodone-acetaminophen (NORCO) 7.5-325 mg per tablet Take 1 Tab by mouth every six (6) hours as needed for Pain. Max Daily Amount: 4 Tabs.  20 Tab 0    aspirin delayed-release 81 mg tablet Take 1 Tab by mouth daily. 30 Tab 11    atenolol (TENORMIN) 25 mg tablet Take 1 Tab by mouth nightly. Indications: intentional tremor 30 Tab 6     No Known Allergies  Past Medical History:   Diagnosis Date    Cellulitis and abscess of trunk 7/31/2014    Chronic back pain greater than 3 months duration 7/31/2014    Family history of prostate cancer 7/31/2014    Hyperhydrosis disorder 7/31/2014    Hypertension     Intention tremor 12/9/2015    Prediabetes 6/25/2014    [facility]/2014    Rash of perineum 6/25/2014    Tinea manus 1/19/2016    Tinea pedis of both feet 1/19/2016    Tobacco abuse 6/25/2014     Past Surgical History:   Procedure Laterality Date    HX ORTHOPAEDIC      right leg and knee     Family History   Problem Relation Age of Onset    Diabetes Mother     Hypertension Mother     Stroke Mother     Diabetes Father     Hypertension Father      Social History     Tobacco Use    Smoking status: Former Smoker     Packs/day: 1.00    Smokeless tobacco: Never Used   Substance Use Topics    Alcohol use: Yes     Comment: occasionally        Lab Results   Component Value Date/Time    WBC 9.7 03/04/2019 05:19 PM    HGB 14.2 03/04/2019 05:19 PM    HCT 41.8 03/04/2019 05:19 PM    PLATELET 286 18/13/0683 05:19 PM    MCV 83 03/04/2019 05:19 PM     Lab Results   Component Value Date/Time    TSH 1.700 08/10/2016 04:40 PM         Objective:     Visit Vitals  /85 (BP 1 Location: Left arm, BP Patient Position: At rest)   Pulse 68   Temp 97.6 °F (36.4 °C) (Oral)   Resp 20   Ht 5' 7\" (1.702 m)   Wt 290 lb 1.6 oz (131.6 kg)   SpO2 96%   BMI 45.44 kg/m²     The patient appears well, alert, oriented x 3, in no distress. ENT normal.  Neck supple. No adenopathy or thyromegaly. DAVID. Lungs are clear, good air entry, no wheezes, rhonchi or rales. S1 and S2 normal, no murmurs, regular rate and rhythm. Abdomen is soft without tenderness, guarding, mass or organomegaly.   exam: no penile lesions or discharge, no testicular masses or tenderness, no hernias. Extremities show no edema, normal peripheral pulses. Neurological is normal without focal findings. Assessment/Plan:   healthy adult male  lose weight, increase physical activity, limit alcohol consumption, bring BP log to office visit, follow low fat diet, follow low salt diet, continue present plan, routine labs ordered, call if any problems. Diagnoses and all orders for this visit:    1. Routine medical exam  -     [place] HANDLG&/OR CONVEY OF SPEC FOR TR OFFICE TO LAB    2. Screen for colon cancer    3. Prostate cancer screening  -     PSA, DIAGNOSTIC (PROSTATE SPECIFIC AG)  -     [place] HANDLG&/OR CONVEY OF SPEC FOR TR OFFICE TO LAB    4. Mixed hyperlipidemia  -     CBC W/O DIFF  -     PSA, DIAGNOSTIC (PROSTATE SPECIFIC AG)  -     TSH 3RD GENERATION  -     HEMOGLOBIN A1C WITH EAG  -     METABOLIC PANEL, COMPREHENSIVE  -     LIPID PANEL  -     REFERRAL TO GASTROENTEROLOGY  -     traMADol (ULTRAM) 50 mg tablet; Take 1 Tab by mouth every eight (8) hours as needed for Pain for up to 5 days. Max Daily Amount: 150 mg.  -     [place] HANDLG&/OR CONVEY OF SPEC FOR TR OFFICE TO LAB    5. Encounter for immunization  -     PNEUMOCOCCAL POLYSACCHARIDE VACCINE, 23-VALENT, ADULT OR IMMUNOSUPPRESSED PT DOSE,    6. Prediabetes  -     CBC W/O DIFF  -     PSA, DIAGNOSTIC (PROSTATE SPECIFIC AG)  -     TSH 3RD GENERATION  -     HEMOGLOBIN A1C WITH EAG  -     METABOLIC PANEL, COMPREHENSIVE  -     LIPID PANEL  -     REFERRAL TO GASTROENTEROLOGY  -     traMADol (ULTRAM) 50 mg tablet; Take 1 Tab by mouth every eight (8) hours as needed for Pain for up to 5 days. Max Daily Amount: 150 mg.  -     [place] HANDLG&/OR CONVEY OF SPEC FOR TR OFFICE TO LAB    7.  HTN, goal below 130/80  -     CBC W/O DIFF  -     PSA, DIAGNOSTIC (PROSTATE SPECIFIC AG)  -     TSH 3RD GENERATION  -     HEMOGLOBIN A1C WITH EAG  -     METABOLIC PANEL, COMPREHENSIVE  -     LIPID PANEL  -     REFERRAL TO GASTROENTEROLOGY  -     traMADol (ULTRAM) 50 mg tablet; Take 1 Tab by mouth every eight (8) hours as needed for Pain for up to 5 days. Max Daily Amount: 150 mg.  -     [place] HANDLG&/OR CONVEY OF SPEC FOR TR OFFICE TO LAB    8. Tobacco abuse  -     CBC W/O DIFF  -     PSA, DIAGNOSTIC (PROSTATE SPECIFIC AG)  -     TSH 3RD GENERATION  -     HEMOGLOBIN A1C WITH EAG  -     METABOLIC PANEL, COMPREHENSIVE  -     LIPID PANEL  -     REFERRAL TO GASTROENTEROLOGY  -     traMADol (ULTRAM) 50 mg tablet; Take 1 Tab by mouth every eight (8) hours as needed for Pain for up to 5 days. Max Daily Amount: 150 mg.    9. Intention tremor  -     CBC W/O DIFF  -     PSA, DIAGNOSTIC (PROSTATE SPECIFIC AG)  -     TSH 3RD GENERATION  -     HEMOGLOBIN A1C WITH EAG  -     METABOLIC PANEL, COMPREHENSIVE  -     LIPID PANEL  -     REFERRAL TO GASTROENTEROLOGY  -     traMADol (ULTRAM) 50 mg tablet; Take 1 Tab by mouth every eight (8) hours as needed for Pain for up to 5 days. Max Daily Amount: 150 mg. 10. Hip pain, acute, left  -     methylPREDNISolone (MEDROL DOSEPACK) 4 mg tablet; As directed for 6 days one package  -     indomethacin (INDOCIN) 50 mg capsule; Take 1 Cap by mouth three (3) times daily for 6 days. -     dexamethasone, PF, (DECADRON) 10 mg/mL injection; 1 mL by IntraMUSCular route once for 1 dose. -     DEXAMETHASONE SODIUM PHOSPHATE INJECTION 1 MG  -     [place] THER/PROPH/DIAG INJECTION, SUBCUT/IM    11. Obesity, morbid ([address]    .

## 2019-07-17 NOTE — LETTER
NOTIFICATION RETURN TO WORK / SCHOOL 
 
 
 
7/17/2019 3:26 PM 
 
 
 
Mr. Coty Hernandez [address] [id number] To Whom It May Concern: 
 
Coty Hernandez is currently under the care of [address] [place] [address] OFFICE[facility]. He will return to work/school on: 7/ 20/2010 If there are questions or concerns please have the patient contact our office.  
 
 
 
Sincerely, 
 
 
Sophie [place] MD

## 2019-07-17 NOTE — PROGRESS NOTES
Name and  verified      Chief Complaint   Patient presents with    Hip Pain     Right. Patient denies injury       Health Maintenance reviewed-discussed with patient. 1. Have you been to the ER, urgent care clinic since your last visit? Hospitalized since your last visit? Yes, office visit Rheumatology on 3/4/2019.    2. Have you seen or consulted any other health care providers outside of the [address] [place] [address] since your last visit? Include any pap smears or colon screening.  no

## 2019-07-17 NOTE — PATIENT INSTRUCTIONS
Prostate Cancer Screening: Care Instructions  Your Care Instructions    The prostate gland is an organ found just below a man's bladder. It is the size and shape of a walnut. It surrounds the tube that carries urine from the bladder out of the body through the penis. This tube is called the urethra. Prostate cancer is the abnormal growth of cells in the prostate. It is the second most common type of cancer in men. (Skin cancer is the most common.)  Most cases of prostate cancer occur in men older than 72. The disease runs in families. And it's more common in -American men. When it's found and treated early, prostate cancer may be cured. But it is not always treated. This is because prostate cancer may not shorten your life, especially if you are older and the cancer is growing slowly. Follow-up care is a key part of your treatment and safety. Be sure to make and go to all appointments, and call your doctor if you are having problems. It's also a good idea to know your test results and keep a list of the medicines you take. What are the screening tests for prostate cancer? The main screening test for prostate cancer is the prostate-specific antigen (PSA) test. This is a blood test that measures how much PSA is in your blood. A high level may mean that you have an enlargement, an infection, or cancer. Along with the PSA test, you may have a digital rectal exam. The digital (finger) rectal exam checks for anything abnormal in your prostate. To do the exam, the doctor puts a lubricated, gloved finger into your rectum. If these tests suggest cancer, you may need a prostate biopsy. How is prostate cancer diagnosed? In a biopsy, the doctor takes small tissue samples from your prostate gland. Another doctor then looks at the tissue under a microscope to see if there are cancer cells, signs of infection, or other problems. The results help diagnose prostate cancer.   What are the pros and cons of screening? Neither a PSA test nor a digital rectal exam can tell you for sure that you do or do not have cancer. But they can help you decide if you need more tests, such as a prostate biopsy. Screening tests may be useful because most men with prostate cancer don't have symptoms. It can be hard to know if you have cancer until it is more advanced. And then it's harder to treat. But having a PSA test can also cause harm. The test may show high levels of PSA that aren't caused by cancer. So you could have a prostate biopsy you didn't need. Or the PSA test might be normal when there is cancer, so a cancer might not be found early. The test can also find cancers that would never have caused a problem during your lifetime. So you might have treatment that was not needed. Prostate cancer usually develops late in life and grows slowly. For many men, it does not shorten their lives. Some experts advise screening only for men who are at high risk. Talk with your doctor to see if screening is right for you. Where can you learn more? Go to http://jada-moreno.info/. Enter R550 in the search box to learn more about \"Prostate Cancer Screening: Care Instructions. \"  Current as of: March 27, 2018  Content Version: 11.9  © 8545-0805 [employer]. Care instructions adapted under license by [employer] (which disclaims liability or warranty for this information). If you have questions about a medical condition or this instruction, always ask your healthcare professional. [place] [address] any warranty or liability for your use of this information. Colon Cancer Screening: Care Instructions  Your Care Instructions    Colorectal cancer occurs in the colon or rectum. That's the lower part of your digestive system. It is the second-leading cause of cancer deaths in the [place]. It often starts with small growths called polyps in the colon or rectum.  Polyps are usually found with screening tests. Depending on the type of test, any polyps found may be removed during the tests. Colorectal cancer usually does not cause symptoms at first. But regular tests can help find it early, before it spreads and becomes harder to treat. Experts advise routine tests for colon cancer for people starting at age 48. And they advise people with a higher risk of colon cancer to get tested sooner. Talk with your doctor about when you should start testing. Discuss which tests you need. Follow-up care is a key part of your treatment and safety. Be sure to make and go to all appointments, and call your doctor if you are having problems. It's also a good idea to know your test results and keep a list of the medicines you take. What are the main screening tests for colon cancer? · Stool tests. These include the fecal immunochemical test (FIT) and the fecal occult blood test (FOBT). These tests check stool samples for signs of cancer. If your test is positive, you will need to have a colonoscopy. · Sigmoidoscopy. This test lets your doctor look at the lining of your rectum and the lowest part of your colon. Your doctor uses a lighted tube called a sigmoidoscope. This test can't find cancers or polyps in the upper part of your colon. In some cases, polyps that are found can be removed. But if your doctor finds polyps, you will need to have a colonoscopy to check the upper part of your colon. · Colonoscopy. This test lets your doctor look at the lining of your rectum and your entire colon. The doctor uses a thin, flexible tool called a colonoscope. It can also be used to remove polyps or get a tissue sample (biopsy). What tests do you need? The following guidelines are for people age 48 and over who are not at high risk for colorectal cancer. You may have at least one of these tests as directed by your doctor.   · Fecal immunochemical test (FIT) or fecal occult blood test (FOBT) every year  · Sigmoidoscopy every 5 years  · Colonoscopy every 10 years  If you are age 68 to 80, you can work with your doctor to decide if screening is a good option. If you are age 80 or older, your doctor will likely advise that screening is not helpful. Talk with your doctor about when you need to be tested. And discuss which tests are right for you. Your doctor may recommend earlier or more frequent testing if you:  · Have had colorectal cancer before. · Have had colon polyps. · Have symptoms of colorectal cancer. These include blood in your stool and changes in your bowel habits. · Have a parent, brother or sister, or child with colon polyps or colorectal cancer. · Have a bowel disease. This includes ulcerative colitis and Crohn's disease. · Have a rare polyp syndrome that runs in families, such as familial adenomatous polyposis (FAP). · Have had radiation treatments to the belly or pelvis. When should you call for help? Watch closely for changes in your health, and be sure to contact your doctor if:    · You have any changes in your bowel habits.     · You have any problems. Where can you learn more? Go to http://jada-moreno.info/. Enter M541 in the search box to learn more about \"Colon Cancer Screening: Care Instructions. \"  Current as of: March 28, 2018  Content Version: 11.8  © 6352-5996 [employer]. Care instructions adapted under license by [employer] (which disclaims liability or warranty for this information). If you have questions about a medical condition or this instruction, always ask your healthcare professional. [place] [address] any warranty or liability for your use of this information. Preventing Falls: Care Instructions  Your Care Instructions    Getting around your home safely can be a challenge if you have injuries or health problems that make it easy for you to fall.  Loose rugs and furniture in walkways are among the dangers for many older people who have problems walking or who have poor eyesight. People who have conditions such as arthritis, osteoporosis, or dementia also have to be careful not to fall. You can make your home safer with a few simple measures. Follow-up care is a key part of your treatment and safety. Be sure to make and go to all appointments, and call your doctor if you are having problems. It's also a good idea to know your test results and keep a list of the medicines you take. How can you care for yourself at home? Taking care of yourself  · You may get dizzy if you do not drink enough water. To prevent dehydration, drink plenty of fluids, enough so that your urine is light yellow or clear like water. Choose water and other caffeine-free clear liquids. If you have kidney, heart, or liver disease and have to limit fluids, talk with your doctor before you increase the amount of fluids you drink. · Exercise regularly to improve your strength, muscle tone, and balance. Walk if you can. Swimming may be a good choice if you cannot walk easily. · Have your vision and hearing checked each year or any time you notice a change. If you have trouble seeing and hearing, you might not be able to avoid objects and could lose your balance. · Know the side effects of the medicines you take. Ask your doctor or pharmacist whether the medicines you take can affect your balance. Sleeping pills or sedatives can affect your balance. · Limit the amount of alcohol you drink. Alcohol can impair your balance and other senses. · Ask your doctor whether calluses or corns on your feet need to be removed. If you wear loose-fitting shoes because of calluses or corns, you can lose your balance and fall. · Talk to your doctor if you have numbness in your feet. Preventing falls at home  · Remove raised doorway thresholds, throw rugs, and clutter. Repair loose carpet or raised areas in the floor.   · Move furniture and electrical cords to keep them out of walking paths. · Use nonskid floor wax, and wipe up spills right away, especially on ceramic tile floors. · If you use a walker or cane, put rubber tips on it. If you use crutches, clean the bottoms of them regularly with an abrasive pad, such as steel wool. · Keep your house well lit, especially [place], and outside walkways. Use night-lights in areas such as hallways and bathrooms. Add extra light switches or use remote switches (such as switches that go on or off when you clap your hands) to make it easier to turn lights on if you have to get up during the night. · Install sturdy handrails on stairways. · Move items in your cabinets so that the things you use a lot are on the lower shelves (about waist level). · Keep a cordless phone and a flashlight with new batteries by your bed. If possible, put a phone in each of the main rooms of your house, or carry a cell phone in case you fall and cannot reach a phone. Or, you can wear a device around your neck or wrist. You push a button that sends a signal for help. · Wear low-heeled shoes that fit well and give your feet good support. Use footwear with nonskid soles. Check the heels and soles of your shoes for wear. Repair or replace worn heels or soles. · Do not wear socks without shoes on wood floors. · Walk on the grass when the sidewalks are slippery. If you live in an area that gets snow and ice in the winter, sprinkle salt on slippery steps and sidewalks. Preventing falls in the bath  · Install grab bars and nonskid mats inside and outside your shower or tub and near the toilet and sinks. · Use shower chairs and bath benches. · Use a hand-held shower head that will allow you to sit while showering.   · Get into a tub or shower by putting the weaker leg in first. Get out of a tub or shower with your strong side first.  · Repair loose toilet seats and consider installing a raised toilet seat to make getting on and off the toilet easier. · Keep your bathroom door unlocked while you are in the shower. Where can you learn more? Go to http://jada-moreno.info/. Enter 0476 [id number] in the search box to learn more about \"Preventing Falls: Care Instructions. \"  Current as of: March 16, 2018  Content Version: 11.8  © 6445-7865 [employer]     Back Pain: Care Instructions  Your Care Instructions    Back pain has many possible causes. It is often related to problems with muscles and ligaments of the back. It may also be related to problems with the nerves, discs, or bones of the back. Moving, lifting, standing, sitting, or sleeping in an awkward way can strain the back. Sometimes you don't notice the injury until later. Arthritis is another common cause of back pain. Although it may hurt a lot, back pain usually improves on its own within several weeks. Most people recover in 12 weeks or less. Using good home treatment and being careful not to stress your back can help you feel better sooner. Follow-up care is a key part of your treatment and safety. Be sure to make and go to all appointments, and call your doctor if you are having problems. It's also a good idea to know your test results and keep a list of the medicines you take. How can you care for yourself at home? · Sit or lie in positions that are most comfortable and reduce your pain. Try one of these positions when you lie down:  ? Lie on your back with your knees bent and supported by large pillows. ? Lie on the floor with your legs on the seat of a sofa or chair. ? Lie on your side with your knees and hips bent and a pillow between your legs. ? Lie on your stomach if it does not make pain worse. · Do not sit up in bed, and avoid soft couches and twisted positions. Bed rest can help relieve pain at first, but it delays healing. Avoid bed rest after the first day of back pain. · Change positions every 30 minutes.  If you must sit for long periods of time, take breaks from sitting. Get up and walk around, or lie in a comfortable position. · Try using a heating pad on a low or medium setting for 15 to 20 minutes every 2 or 3 hours. Try a warm shower in place of one session with the heating pad. · You can also try an ice pack for 10 to 15 minutes every 2 to 3 hours. Put a thin cloth between the ice pack and your skin. · Take pain medicines exactly as directed. ? If the doctor gave you a prescription medicine for pain, take it as prescribed. ? If you are not taking a prescription pain medicine, ask your doctor if you can take an over-the-counter medicine. · Take short walks several times a day. You can start with 5 to 10 minutes, 3 or 4 times a day, and work up to longer walks. Walk on level surfaces and avoid hills and stairs until your back is better. · Return to work and other activities as soon as you can. Continued rest without activity is usually not good for your back. · To prevent future back pain, do exercises to stretch and strengthen your back and stomach. Learn how to use good posture, safe lifting techniques, and proper body mechanics. When should you call for help? Call your doctor now or seek immediate medical care if:    · You have new or worsening numbness in your legs.     · You have new or worsening weakness in your legs. (This could make it hard to stand up.)     · You lose control of your bladder or bowels.    Watch closely for changes in your health, and be sure to contact your doctor if:    · You have a fever, lose weight, or don't feel well.     · You do not get better as expected. Where can you learn more? Go to http://jada-moreno.info/. Enter T108 in the search box to learn more about \"Back Pain: Care Instructions. \"  Current as of: September 20, 2018  Content Version: 11.9  © 4192-7710 [employer].  Care instructions adapted under license by [employer] (which disclaims liability or warranty for this information). If you have questions about a medical condition or this instruction, always ask your healthcare professional. [place] [address] any warranty or liability for your use of this information. [employer] (which disclaims liability or warranty for this information). If you have questions about a medical condition or this instruction, always ask your healthcare professional. [address] any warranty or liability for your use of this information. Dexamethasone (By injection)   Dexamethasone (dex-a-METH-a-sone)  Treats symptoms of several diseases and conditions. Also used to test for adrenal gland problems. This medicine is a corticosteroid. Brand Name(s): Active Injection Kit D, Active Injection Kit DL, Active Injection Kit DLM, Active Injection Kit M-1, DMT Suik, Dexamethasone Sodium Phosphate Novaplus, Dexlido, Dexlido-M, DoubleDex, Mardex-25, PremierPro Rx Dexamethasone Sodium Phosphate, ReadySharp Dexamethasone, Sterile Ultrasound Epidural Kit   There may be other brand names for this medicine. When This Medicine Should Not Be Used: This medicine is not right for everyone. You should not receive it if you had an allergic reaction to dexamethasone or sulfites, or if you have a fungal infection. How to Use This Medicine:   Injectable  · Your doctor will prescribe your dose and schedule. This medicine is given as a shot into a muscle, a needle placed in a vein, or directly into a joint. · A nurse or other health provider will give you this medicine. · Missed dose: You must use this medicine on a fixed schedule. Call your doctor or pharmacist if you miss a dose. Drugs and Foods to Avoid:   Ask your doctor or pharmacist before using any other medicine, including over-the-counter medicines, vitamins, and herbal products. · Some medicines can affect how dexamethasone works.  Tell your doctor if you are also using any of the following:   ¨ Amphotericin B  ¨ Aspirin  ¨ Blood thinner, such as warfarin  ¨ Diuretic (water pill, such as spironolactone, triamterene)  ¨ Indomethacin  ¨ Phenytoin, phenobarbital  ¨ Rifampin  ¨ Thalidomide  · This medicine may interfere with vaccines. Ask your doctor before you get a flu shot or any other vaccines. Warnings While Using This Medicine:   · Tell your doctor if you are pregnant or breastfeeding, or if you have kidney problems, liver disease, diabetes, glaucoma, herpes infection of the eye, asthma, allergies, osteoporosis, stomach or bowel problems, myasthenia gravis, or thyroid problems. Tell your doctor if you have high blood pressure or heart failure, or if you had a recent heart attack. Also tell your doctor if you have any type of infection or a history of depression or mental problems. · This medicine may cause the following problems:  ¨ Mood or behavior changes  ¨ Higher blood pressure, retaining water, changes in salt or potassium levels in your body  ¨ Cataracts or glaucoma (with long-term use)  ¨ Bone loss (with long-term use)  ¨ Slow growth in children (with long-term use)  · Do not stop using this medicine suddenly. Your doctor will need to slowly decrease your dose before you stop it completely. · This medicine could cause you to get infections more easily. Tell your doctor right away if you are exposed to chicken pox, measles, or any other serious infection. Tell your doctor if you had a serious infection in the past, such as tuberculosis. · Tell your doctor about any extra stress or anxiety in your life. Your dose might need to be changed for a short time. · Tell any doctor or dentist who treats you that you are using this medicine. This medicine may affect certain medical test results. · If this medicine is injected into one of your joints, follow your doctor's instructions about resting the joint, even if it feels better.   Possible Side Effects While Using This Medicine:   Call your doctor right away if you notice any of these side effects:  · Allergic reaction: Itching or hives, swelling in your face or hands, swelling or tingling in your mouth or throat, chest tightness, trouble breathing  · Dark freckles, skin changes, coldness, weakness, tiredness, nausea, vomiting, weight loss  · Depression, unusual thoughts, feelings, or behaviors, trouble sleeping  · Changes in vision, trouble seeing, eye pain  · Diarrhea  · Dry mouth, increased thirst, muscle cramps, nausea or vomiting  · Fever, chills, cough, sore throat, body aches  · Rapid weight gain, swelling in your hands, ankles, or feet  · Severe stomach pain, nausea, vomiting, or red or black stools  · Worsened joint pain, swelling, or stiffness  If you notice these less serious side effects, talk with your doctor:   · Round, puffy face  · Weight gain around your neck, upper back, breast, face, or waist  If you notice other side effects that you think are caused by this medicine, tell your doctor. Call your doctor for medical advice about side effects. You may report side effects to FDA at 7-281-FDA-0871  © 2017 [facility] Information is for End User's use only and may not be sold, redistributed or otherwise used for commercial purposes. The above information is an  only. It is not intended as medical advice for individual conditions or treatments. Talk to your doctor, nurse or pharmacist before following any medical regimen to see if it is safe and effective for you.

## 2019-07-18 LAB
ALBUMIN SERPL-MCNC: 4.3 G/DL (ref 3.5–5.5)
ALBUMIN/GLOB SERPL: 1.3 {RATIO} (ref 1.2–2.2)
ALP SERPL-CCNC: 65 IU/L (ref 39–117)
ALT SERPL-CCNC: 39 IU/L (ref 0–44)
AST SERPL-CCNC: 26 IU/L (ref 0–40)
BILIRUB SERPL-MCNC: 0.3 MG/DL (ref 0–1.2)
BUN SERPL-MCNC: 13 MG/DL (ref 6–24)
BUN/CREAT SERPL: 13 (ref 9–20)
CALCIUM SERPL-MCNC: 9.5 MG/DL (ref 8.7–10.2)
CHLORIDE SERPL-SCNC: 97 MMOL/L (ref 96–106)
CHOLEST SERPL-MCNC: 145 MG/DL (ref 100–199)
CO2 SERPL-SCNC: 26 MMOL/L (ref 20–29)
CREAT SERPL-MCNC: 0.97 MG/DL (ref 0.76–1.27)
ERYTHROCYTE [DISTWIDTH] IN BLOOD BY AUTOMATED COUNT: 15.1 % (ref 12.3–15.4)
EST. AVERAGE GLUCOSE BLD GHB EST-MCNC: 137 MG/DL
GLOBULIN SER CALC-MCNC: 3.4 G/DL (ref 1.5–4.5)
GLUCOSE SERPL-MCNC: 93 MG/DL (ref 65–99)
HBA1C MFR BLD: 6.4 % (ref 4.8–5.6)
HCT VFR BLD AUTO: 43.6 % (ref 37.5–51)
HDLC SERPL-MCNC: 58 MG/DL
HGB BLD-MCNC: 15 G/DL (ref 13–17.7)
LDLC SERPL CALC-MCNC: 66 MG/DL (ref 0–99)
MCH RBC QN AUTO: 28.4 PG (ref 26.6–33)
MCHC RBC AUTO-ENTMCNC: 34.4 G/DL (ref 31.5–35.7)
MCV RBC AUTO: 82 FL (ref 79–97)
PLATELET # BLD AUTO: 270 X10E3/UL (ref 150–450)
POTASSIUM SERPL-SCNC: 3.6 MMOL/L (ref 3.5–5.2)
PROT SERPL-MCNC: 7.7 G/DL (ref 6–8.5)
PSA SERPL-MCNC: 0.5 NG/ML (ref 0–4)
RBC # BLD AUTO: 5.29 X10E6/UL (ref 4.14–5.8)
SODIUM SERPL-SCNC: 137 MMOL/L (ref 134–144)
TRIGL SERPL-MCNC: 106 MG/DL (ref 0–149)
TSH SERPL DL<=0.005 MIU/L-ACNC: 1.43 UIU/ML (ref 0.45–4.5)
VLDLC SERPL CALC-MCNC: 21 MG/DL (ref 5–40)
WBC # BLD AUTO: 8.5 X10E3/UL (ref 3.4–10.8)

## 2019-07-18 NOTE — PROGRESS NOTES
After obtaining consent, and per orders of ,decadron 10mg/ml  given to  left glut IM  . Patient instructed to remain in clinic for 15 minutes afterwards, and to report any adverse reaction to me immediately.  Patient did not have any adverse reactions during this office visit

## 2019-07-19 ENCOUNTER — OFFICE VISIT (OUTPATIENT)
Dept: RHEUMATOLOGY | Age: 54
End: 2019-07-19

## 2019-07-19 VITALS
BODY MASS INDEX: 45.23 KG/M2 | RESPIRATION RATE: 16 BRPM | DIASTOLIC BLOOD PRESSURE: 98 MMHG | OXYGEN SATURATION: 96 % | TEMPERATURE: 98.2 F | WEIGHT: 288.8 LBS | HEART RATE: 66 BPM | SYSTOLIC BLOOD PRESSURE: 142 MMHG

## 2019-07-19 DIAGNOSIS — M05.79 SEROPOSITIVE RHEUMATOID ARTHRITIS OF MULTIPLE SITES (HCC): Primary | ICD-10-CM

## 2019-07-19 RX ORDER — LANOLIN ALCOHOL/MO/W.PET/CERES
500 CREAM (GRAM) TOPICAL DAILY
COMMUNITY
End: 2021-10-11 | Stop reason: ALTCHOICE

## 2019-07-19 RX ORDER — DICLOFENAC SODIUM 75 MG/1
TABLET, DELAYED RELEASE ORAL
COMMUNITY
End: 2019-08-30 | Stop reason: SDUPTHER

## 2019-07-19 RX ORDER — TURMERIC 400 MG
CAPSULE ORAL DAILY
COMMUNITY
End: 2021-10-11 | Stop reason: ALTCHOICE

## 2019-07-19 RX ORDER — MELATONIN
DAILY
COMMUNITY
End: 2022-01-21 | Stop reason: ALTCHOICE

## 2019-07-19 NOTE — PROGRESS NOTES
Chief Complaint   Patient presents with    Joint Pain     1. Have you been to the ER, urgent care clinic since your last visit? Hospitalized since your last visit? Yes Knee and Hip pain    2. Have you seen or consulted any other health care providers outside of the [address] [place] [address] since your last visit? Include any pap smears or colon screening.  No

## 2019-07-19 NOTE — PROGRESS NOTES
RHEUMATOLOGY PROBLEM LIST AND CHIEF COMPLAINT  1. Rheumatoid Arthritis (2015) - Joint pain/swelling, synovitis in hands, wrists, elbows, rheumatoid nodules on elbows, morning stiffness. Elevated ESR, CCP, RF, CRP. Therapy History:  Previous DMARDs: MTX (Past-09/2016, 10/2016 - 12/2017; AEs- nodulosis)   Current DMARDs: Enbrel (11/2015-current), Plaquenil (2015 - 2017, 3/2018-current)    2. Shoulder OA and rotator cuff disease    INTERVAL HISTORY  This is a 48 y.o.  male. Today, the patient complains of pain in the joints. Location: knee, hip  Severity:  10 on a scale of 0-10  Timing: all day  Duration:  4 months  Context/Associated signs and symptoms: The patient's rheumatoid arthritis is stable. He continues with Enbrel 50 mg weekly and Plaquenil 400 mg daily. He is now receiving Enbrel through the safety net program. The patient's chief complaint today is a shooting pain down lateral right hip and persistent knee pain. He was given steroids by his primary care physician to treat the sciatica with moderate improvement. His PCP recommended restarting Methotrexate, but we discussed this not advised due the nodulosis he developed with Methotrexate use and would not help OA. He requests a steroid injection in the right knee to improve his knee symptoms.      RHEUMATOLOGY REVIEW OF SYSTEMS   Positives as per history  Negatives as follows:  Yesenia Canfadie:  Denies unexplained persistent fevers, weight change, chronic fatigue  HEAD/EYES:   Denies eye redness, blurry vision or sudden loss of vision, dry eyes, HA, temporal artery pain  ENT:    Denies oral/nasal ulcers, recurrent sinus infections, dry mouth  RESPIRATORY:  No pleuritic pain, history of pleural effusions, hemoptysis, exertional dyspnea  CARDIOVASCULAR:  Denies chest pain, history of pericardial effusions  GASTRO:   Denies heartburn, esophageal dysmotility, abdominal pain, nausea, vomiting, diarrhea, blood in the stool  HEMATOLOGIC:  No easy bruising, purpura, swollen lymph nodes  SKIN:    Denies alopecia, ulcers, nodules, sun sensitivity, unexplained persistent rash   VASCULAR:   Denies edema, cyanosis, raynaud phenomenon  NEUROLOGIC:  Denies specific muscle weakness, paresthesias   PSYCHIATRIC:  No sleep disturbance / snoring, depression, anxiety  MSK:    No morning stiffness >1 hour, SI joint pain, persistent joint swelling    PAST MEDICAL HISTORY  Reviewed with patient, significant changes in medical history - no    PHYSICAL EXAM  Blood pressure (!) 142/98, pulse 66, temperature 98.2 °F (36.8 °C), temperature source Oral, resp. rate 16, weight 288 lb 12.8 oz (131 kg), SpO2 96 %. GENERAL APPEARANCE: Well-nourished, no acute distress  NECK: No adenopathy  ENT: No oral ulcers  CARDIOVASCULAR: Heart rhythm is regular. No murmur, rub, gallop  CHEST: Normal vesicular breath sounds. No wheezes, rales, pleural friction rubs  ABDOMINAL: The abdomen is soft and nontender. Bowel sounds are normal  SKIN: No rash, palpable purpura, digital ulcer, abnormal thickening   MUSCULOSKELETAL: Rotator cuff signs on the left - unchanged, Antalgic gait  Upper extremities -  2 Nodules noted over left elbow - unchanged, no synovitis. Some synovial thickening in the right 3rd digit, MCP & PIP. Lower extremities - B/L Knee crepitus, bony prominence (R>L), scar over right knee from previous surgery. Mild effusion right knee. LABS, RADIOLOGY AND PROCEDURES   Previous labs reviewed -Yes    ASSESSMENT  1. Rheumatoid Arthritis (Established problem -  Stable disease) - His disease continues to be stable, he has no synovitis in the hands on exam. He should continue with his medications Plaquenil 400 mg daily and Enbrel 50 mg weekly. He should return in 3 months for a follow up. I will check labs including a TB test at next visit. 2. Drug therapy monitoring for toxicity (methotrexate/Enbrel) - CBC, BUN, Cr, AST, ALT and albumin every 4 months  3.  Osteoarthritis - His complaints today are from OA. I will give him a steroid injection to help improve his knee pain and mobility. I will refer him to pain management for his sciatica. He should continue with diclofenac PRN and weight loss. PLAN  1. Enbrel 50 mg weekly; samples given today  2. Plaquenil 400 mg daily  3. Kenalog 40 mg injection right knee  4. Referral to pain managment  5. Return in 3 months    Refugio Sales MD  Adult and Pediatric Rheumatology     [address] [address], Phone [phone], Fax [phone]   E-mail: [email or web address]    Visiting  of Pediatrics    Department of Pediatrics, [facility] of [address] [place] [address], [address] [place] [address], Phone [phone], Fax [phone]  E-mail: [email or web address]    There are no Patient Instructions on file for this visit. cc:  Chiki Mak MD    Written by jeffery Arita, as dictated by Luis A Guillory. Rox Sales M.D  [facility] NOTE        Chart reviewed for the following:   Kanika Landa MD, have reviewed the History, Physical and updated the Allergic reactions for [address] [place] [address] performed immediately prior to start of procedure:   Kanika Landa MD, have performed the following reviews on [address] [place] [address] prior to the start of the procedure:            * Patient was identified by name and date of birth   * Agreement on procedure being performed was verified  * Risks and Benefits explained to the patient  * Procedure site verified and marked as necessary  * Patient was positioned for comfort  * Consent was signed and verified     Time: 10:50      Date of procedure: 7/19/2019    Procedure performed by:   Yusef Prince MD    Provider assisted by: None      Patient assisted by: self    How tolerated by patient: tolerated the procedure well with no complications    Post Procedural Pain Scale: 0 - No Hurt  PROCEDURE  After consent was obtained, using sterile technique the right knee was prepped and Kenalog 40 mg and 1ml of lidocaine was then injected and the needle withdrawn. The procedure was well tolerated. The patient is asked to continue to rest for 24 hours before resuming regular activities. It may be more painful for the first 1-2 days. Watch for fever, or increased swelling or persistent pain. Call or return to clinic prn if such symptoms occur.

## 2019-07-31 ENCOUNTER — TELEPHONE (OUTPATIENT)
Dept: RHEUMATOLOGY | Age: 54
End: 2019-07-31

## 2019-07-31 ENCOUNTER — DOCUMENTATION ONLY (OUTPATIENT)
Dept: PHARMACY | Age: 54
End: 2019-07-31

## 2019-07-31 RX ORDER — LIDOCAINE HYDROCHLORIDE 10 MG/ML
1 INJECTION, SOLUTION EPIDURAL; INFILTRATION; INTRACAUDAL; PERINEURAL ONCE
Qty: 1 ML | Refills: 0
Start: 2019-07-31 | End: 2019-07-31

## 2019-07-31 RX ORDER — TRIAMCINOLONE ACETONIDE 40 MG/ML
40 INJECTION, SUSPENSION INTRA-ARTICULAR; INTRAMUSCULAR ONCE
Qty: 1 ML | Refills: 0
Start: 2019-07-31 | End: 2019-07-31

## 2019-07-31 RX ORDER — ETANERCEPT 50 MG/ML
50 SOLUTION SUBCUTANEOUS
Qty: 3.92 ML | Refills: 6 | Status: SHIPPED | OUTPATIENT
Start: 2019-07-31 | End: 2019-07-31 | Stop reason: SDUPTHER

## 2019-07-31 NOTE — TELEPHONE ENCOUNTER
Returned call to patient used 2 identifiers, and informed him prescription for Enbrel Sureclick will be sent to [facility].  Will start process for [facility] program.

## 2019-07-31 NOTE — TELEPHONE ENCOUNTER
----- Message from Lyndsey Álvarez RN sent at 7/31/2019  8:56 AM EDT -----  Regarding: FW: Dr. Opal Castañeda      ----- Message -----  From: Hari Umanzor  Sent: 7/31/2019   8:50 AM EDT  To: [facility] Nurse Pool  Subject: Dr. Opal Castañeda                              Pt is requesting a call back from Dr. Isabella Montague or nurse in reference to medication \"Embrel\". Insurance company declined paying for medication. Want to know the status on forms for prescription discount.     Best contact [phone]

## 2019-07-31 NOTE — PROGRESS NOTES
[facility] at [address] [facility] Update    Date: 07/31/19    [facility] 1965    Medication: Enbrel Sureclick    Prescription transferred to specialty pharmacy: [facility]    Phone: [phone]    Prescription was transferred to the mandated specialty pharmacy. Pharmacist confirmed with [facility] that patient has been receiving medication through their pharmacy previously.      Kaleb Scott [address] [place] [address] at [facility], [facility] [address]  phone: [phone]   fax: [phone]

## 2019-08-01 RX ORDER — ETANERCEPT 50 MG/ML
50 SOLUTION SUBCUTANEOUS
Qty: 3.92 ML | Refills: 6 | Status: SHIPPED | OUTPATIENT
Start: 2019-08-01 | End: 2019-11-05 | Stop reason: ALTCHOICE

## 2019-08-29 RX ORDER — HYDROXYCHLOROQUINE SULFATE 200 MG/1
400 TABLET, FILM COATED ORAL DAILY
Qty: 60 TAB | Refills: 6 | Status: CANCELLED | OUTPATIENT
Start: 2019-08-29

## 2019-08-29 RX ORDER — DICLOFENAC SODIUM 75 MG/1
75 TABLET, DELAYED RELEASE ORAL 2 TIMES DAILY
Qty: 60 TAB | Refills: 3 | Status: CANCELLED | OUTPATIENT
Start: 2019-08-29

## 2019-08-29 NOTE — TELEPHONE ENCOUNTER
Patient's wife called (she is emergency contact) states patient needs refill on Plaquenil, and Diclofence. Refill request sent to Dr. Luis Lipscomb.

## 2019-08-29 NOTE — TELEPHONE ENCOUNTER
Patient's wife calling to check on the status of the refill for Hydroxychloroquine, and Diclofenac due to he is out of the medication. Her cell phone is [phone]. The pharmacy is [facility] [address].

## 2019-08-30 RX ORDER — HYDROXYCHLOROQUINE SULFATE 200 MG/1
400 TABLET, FILM COATED ORAL DAILY
Qty: 60 TAB | Refills: 6 | Status: SHIPPED | OUTPATIENT
Start: 2019-08-30 | End: 2020-07-10 | Stop reason: SDUPTHER

## 2019-08-30 RX ORDER — DICLOFENAC SODIUM 75 MG/1
75 TABLET, DELAYED RELEASE ORAL 2 TIMES DAILY
Qty: 60 TAB | Refills: 1 | Status: SHIPPED | OUTPATIENT
Start: 2019-08-30 | End: 2019-11-11 | Stop reason: SDUPTHER

## 2019-09-24 DIAGNOSIS — R73.03 PREDIABETES: ICD-10-CM

## 2019-09-24 DIAGNOSIS — Z72.0 TOBACCO ABUSE: ICD-10-CM

## 2019-09-24 DIAGNOSIS — G25.2 INTENTION TREMOR: ICD-10-CM

## 2019-09-24 DIAGNOSIS — I10 HTN, GOAL BELOW 130/80: ICD-10-CM

## 2019-09-24 RX ORDER — CHLORTHALIDONE 25 MG/1
TABLET ORAL
Qty: 90 TAB | Refills: 0 | Status: SHIPPED | OUTPATIENT
Start: 2019-09-24 | End: 2020-03-04 | Stop reason: SDUPTHER

## 2019-09-26 RX ORDER — ETANERCEPT 50 MG/ML
50 SOLUTION SUBCUTANEOUS
Qty: 4 EACH | Refills: 0 | Status: SHIPPED | COMMUNITY
Start: 2019-09-26 | End: 2019-11-05 | Stop reason: ALTCHOICE

## 2019-10-23 ENCOUNTER — TELEPHONE (OUTPATIENT)
Dept: RHEUMATOLOGY | Age: 54
End: 2019-10-23

## 2019-10-23 NOTE — TELEPHONE ENCOUNTER
Spoke to pt informed pt per Dr Keyonna Jack that the pt can  enbrel samples and also the pt can be switched to another biologic, pt stated that he will  the samples on Friday and pt verbally acknowledged being switched to another biologic

## 2019-10-23 NOTE — TELEPHONE ENCOUNTER
----- Message from Paulino Chino MD sent at 10/22/2019  1:36 PM EDT -----  Regarding: RE: Dr. Dc Roman  If we have enbrel samples we can give him them. Another option is switching him to a different biologic.   ----- Message -----  From: Wilfrido Ken LPN  Sent: 02/91/6519   1:13 PM EDT  To: Paulino Chino MD  Subject: FW: Dr. Dc Roman                             Pt requesting a sample   ----- Message -----  From: Roberto Austin  Sent: 10/22/2019  12:53 PM EDT  To: Vini Rajan LPN  Subject: FW: Dr. Dc Roman                                 ----- Message -----  From: Omero New  Sent: 10/22/2019  12:45 PM EDT  To: [facility]  Subject: Dr. Constanza Billings (if not patient):  Relationship of caller (if not patient):  Best contact number(s): [phone]  Name of medication and dosage if known: Enjana Szymanski  Is patient out of this medication (yes/no): Yes  Pharmacy name:   Pharmacy listed in chart? (yes/no):  Pharmacy phone number:  Details to clarify the request: requesting a sample. Also would like a callback with advise on how to get help paying for medication.      Katerine Mccrary

## 2019-11-04 ENCOUNTER — TELEPHONE (OUTPATIENT)
Dept: RHEUMATOLOGY | Age: 54
End: 2019-11-04

## 2019-11-04 NOTE — TELEPHONE ENCOUNTER
----- Message from Aneta Neither sent at 11/4/2019  2:44 PM EST -----  Regarding: Dr Vu/telephone  Pt is calling to see if he can get cousin Sarah Profit to come and  his samples, he doesn't get off work until 6 pm, he just started a New Job, please call pt at [phone], would like to  tomorrow.

## 2019-11-04 NOTE — TELEPHONE ENCOUNTER
Spoke to pt informed pt that it would be fine for his family member to  the samples for him, pt verbally acknowledged understanding

## 2019-11-05 RX ORDER — ETANERCEPT 50 MG/ML
50 SOLUTION SUBCUTANEOUS
Qty: 4 EACH | Refills: 0 | Status: SHIPPED | COMMUNITY
Start: 2019-11-05 | End: 2019-12-05

## 2019-11-12 RX ORDER — DICLOFENAC SODIUM 75 MG/1
75 TABLET, DELAYED RELEASE ORAL 2 TIMES DAILY
Qty: 60 TAB | Refills: 1 | Status: SHIPPED | OUTPATIENT
Start: 2019-11-12 | End: 2020-03-02

## 2019-11-18 ENCOUNTER — OFFICE VISIT (OUTPATIENT)
Dept: SLEEP MEDICINE | Age: 54
End: 2019-11-18

## 2019-11-18 ENCOUNTER — HOSPITAL ENCOUNTER (OUTPATIENT)
Dept: SLEEP MEDICINE | Age: 54
Discharge: HOME | End: 2019-11-18
Payer: SELF-PAY

## 2019-11-18 VITALS
SYSTOLIC BLOOD PRESSURE: 118 MMHG | HEART RATE: 83 BPM | OXYGEN SATURATION: 96 % | WEIGHT: 292 LBS | BODY MASS INDEX: 45.83 KG/M2 | DIASTOLIC BLOOD PRESSURE: 75 MMHG | HEIGHT: 67 IN

## 2019-11-18 DIAGNOSIS — E66.01 OBESITY, MORBID (HCC): ICD-10-CM

## 2019-11-18 DIAGNOSIS — G47.33 OBSTRUCTIVE SLEEP APNEA (ADULT) (PEDIATRIC): Primary | ICD-10-CM

## 2019-11-18 DIAGNOSIS — R73.03 PREDIABETES: ICD-10-CM

## 2019-11-18 DIAGNOSIS — I10 HTN, GOAL BELOW 130/80: ICD-10-CM

## 2019-11-18 PROCEDURE — 95806 SLEEP STUDY UNATT&RESP EFFT: CPT | Performed by: INTERNAL MEDICINE

## 2019-11-18 PROCEDURE — 95806 SLEEP STUDY UNATT&RESP EFFT: CPT

## 2019-11-18 NOTE — PATIENT INSTRUCTIONS
[address] [facility] [place], [address]  Tel.  [phone]  Fax. [address] [facility] [address]  [place], [address] [facility]  Tel.  [phone]  Fax. [phone] [address]  Tel.  [phone]  Fax. [phone]     Sleep Apnea: After Your Visit  Your Care Instructions  Sleep apnea occurs when you frequently stop breathing for 10 seconds or longer during sleep. It can be mild to severe, based on the number of times per hour that you stop breathing or have slowed breathing. Blocked or narrowed airways in your nose, mouth, or throat can cause sleep apnea. Your airway can become blocked when your throat muscles and tongue relax during sleep. Sleep apnea is common, occurring in 1 out of 20 individuals. Individuals having any of the following characteristics should be evaluated and treated right away due to high risk and detrimental consequences from untreated sleep apnea:  1. Obesity  2. Congestive Heart failure  3. Atrial Fibrillation  4. Uncontrolled Hypertension  5. Type II Diabetes  6. Night-time Arrhythmias  7. Stroke  8. Pulmonary Hypertension  9. High-risk Driving Populations (pilots, truck drivers, etc.)  10. Patients Considering Weight-loss Surgery    How do you know you have sleep apnea? You probably have sleep apnea if you answer 'yes' to 3 or more of the following questions:  S - Have you been told that you Snore? T - Are you often Tired during the day? O - Has anyone Observed you stop breathing while sleeping? P- Do you have (or are being treated for) high blood Pressure? B - Are you obese (Body Mass Index > 35)? A - Is your Age 48years old or older? N - Is your Neck size greater than 16 inches? G - Are you male Gender? A sleep physician can prescribe a breathing device that prevents tissues in the throat from blocking your airway.  Or your doctor may recommend using a dental device (oral breathing device) to help keep your airway open. In some cases, surgery may be needed to remove enlarged tissues in the throat. Follow-up care is a key part of your treatment and safety. Be sure to make and go to all appointments, and call your doctor if you are having problems. It's also a good idea to know your test results and keep a list of the medicines you take. How can you care for yourself at home? · Lose weight, if needed. It may reduce the number of times you stop breathing or have slowed breathing. · Go to bed at the same time every night. · Sleep on your side. It may stop mild apnea. If you tend to roll onto your back, sew a pocket in the back of your pajama top. Put a tennis ball into the pocket, and stitch the pocket shut. This will help keep you from sleeping on your back. · Avoid alcohol and medicines such as sleeping pills and sedatives before bed. · Do not smoke. Smoking can make sleep apnea worse. If you need help quitting, talk to your doctor about stop-smoking programs and medicines. These can increase your chances of quitting for good. · Prop up the head of your bed 4 to 6 inches by putting bricks under the legs of the bed. · Treat breathing problems, such as a stuffy nose, caused by a cold or allergies. · Use a continuous positive airway pressure (CPAP) breathing machine if lifestyle changes do not help your apnea and your doctor recommends it. The machine keeps your airway from closing when you sleep. · If CPAP does not help you, ask your doctor whether you should try other breathing machines. A bilevel positive airway pressure machine has two types of air pressureâone for breathing in and one for breathing out. Another device raises or lowers air pressure as needed while you breathe. · If your nose feels dry or bleeds when using one of these machines, talk with your doctor about increasing moisture in the air. A humidifier may help.   · If your nose is runny or stuffy from using a breathing machine, talk with your doctor about using decongestants or a corticosteroid nasal spray. When should you call for help? Watch closely for changes in your health, and be sure to contact your doctor if:  · You still have sleep apnea even though you have made lifestyle changes. · You are thinking of trying a device such as CPAP. · You are having problems using a CPAP or similar machine. Where can you learn more? Go to [employer]. Enter P224 in the search box to learn more about \"Sleep Apnea: After Your Visit. \"   © 2261-7503 Healthwise, Incorporated. Care instructions adapted under license by [employer] (which disclaims liability or warranty for this information). This care instruction is for use with your licensed healthcare professional. If you have questions about a medical condition or this instruction, always ask your healthcare professional. Naomi Briscoe any warranty or liability for your use of this information. PROPER SLEEP HYGIENE    What to avoid  · Do not have drinks with caffeine, such as coffee or black tea, for 8 hours before bed. · Do not smoke or use other types of tobacco near bedtime. Nicotine is a stimulant and can keep you awake. · Avoid drinking alcohol late in the evening, because it can cause you to wake in the middle of the night. · Do not eat a big meal close to bedtime. If you are hungry, eat a light snack. · Do not drink a lot of water close to bedtime, because the need to urinate may wake you up during the night. · Do not read or watch TV in bed. Use the bed only for sleeping and sexual activity. What to try  · Go to bed at the same time every night, and wake up at the same time every morning. Do not take naps during the day. · Keep your bedroom quiet, dark, and cool. · Get regular exercise, but not within 3 to 4 hours of your bedtime. .  · Sleep on a comfortable pillow and mattress.   · If watching the clock makes you anxious, turn it facing away from you so you cannot see the time. · If you worry when you lie down, start a worry book. Well before bedtime, write down your worries, and then set the book and your concerns aside. · Try meditation or other relaxation techniques before you go to bed. · If you cannot fall asleep, get up and go to another room until you feel sleepy. Do something relaxing. Repeat your bedtime routine before you go to bed again. · Make your house quiet and calm about an hour before bedtime. Turn down the lights, turn off the TV, log off the computer, and turn down the volume on music. This can help you relax after a busy day. Drowsy Driving  The [address] [place] [address] Road Traffic Safety Administration cites drowsiness as a causing factor in more than 003,444 police reported crashes annually, resulting in 76,000 injuries and 1,500 deaths. Other surveys suggest 55% of people polled have driven while drowsy in the past year, 23% had fallen asleep but not crashed, 3% crashed, and 2% had and accident due to drowsy driving. Who is at risk? Young Drivers: One study of drowsy driving accidents states that 55% of the drivers were under 25 years. Of those, 75% were male. Shift Workers and Travelers: People who work overnight or travel across time zones frequently are at higher risk of experiencing Circadian Rhythm Disorders. They are trying to work and function when their body is programed to sleep. Sleep Deprived: Lack of sleep has a serious impact on your ability to pay attention or focus on a task. Consistently getting less than the average of 8 hours your body needs creates partial or cumulative sleep deprivation. Untreated Sleep Disorders: Sleep Apnea, Narcolepsy, R.L.S., and other sleep disorders (untreated) prevent a person from getting enough restful sleep. This leads to excessive daytime sleepiness and increases the risk for drowsy driving accidents by up to 7 times.   Medications / Alcohol: Even over the counter medications can cause drowsiness. Medications that impair a drivers attention should have a warning label. Alcohol naturally makes you sleepy and on its own can cause accidents. Combined with excessive drowsiness its effects are amplified. Signs of Drowsy Driving:   * You don't remember driving the last few miles   * You may drift out of your elise   * You are unable to focus and your thoughts wander   * You may yawn more often than normal   * You have difficulty keeping your eyes open / nodding off   * Missing traffic signs, speeding, or tailgating  Prevention-   Good sleep hygiene, lifestyle and behavioral choices have the most impact on drowsy driving. There is no substitute for sleep and the average person requires 8 hours nightly. If you find yourself driving drowsy, stop and sleep. Consider the sleep hygiene tips provided during your visit as well. Medication Refill Policy: Refills for all medications require 1 week advance notice. Please have your pharmacy fax a refill request. We are unable to fax, or call in \"controled substance\" medications and you will need to pick these prescriptions up from our office. [email or web address] Activation    Thank you for requesting access to [email or web address]. Please follow the instructions below to securely access and download your online medical record. [email or web address] allows you to send messages to your doctor, view your test results, renew your prescriptions, schedule appointments, and more. How Do I Sign Up? 1. In your internet browser, go to [email or web address]. 2. Click on the First Time User? Click Here link in the Sign In box. You will see the New Member Sign Up page. 3. Enter your [email or web address] Access Code exactly as it appears below. You will not need to use this code after youve completed the sign-up process. If you do not sign up before the expiration date, you must request a new code.     [email or web address] Access Code: [email or web address]  Expires: 1/2/2020  4:10 PM (This is the date your [employer] access code will )    4. Enter the last four digits of your Social Security Number (xxxx) and Date of Birth (mm/dd/yyyy) as indicated and click Submit. You will be taken to the next sign-up page. 5. Create a Promuct ID. This will be your [employer] login ID and cannot be changed, so think of one that is secure and easy to remember. 6. Create a [employer] password. You can change your password at any time. 7. Enter your Password Reset Question and Answer. This can be used at a later time if you forget your password. 8. Enter your e-mail address. You will receive e-mail notification when new information is available in [address] 9. Click Sign Up. You can now view and download portions of your medical record. 10. Click the Download Summary menu link to download a portable copy of your medical information. Additional Information    If you have questions, please call [phone]. Remember, [employer] is NOT to be used for urgent needs. For medical emergencies, dial 911.

## 2019-11-18 NOTE — PROGRESS NOTES
[address] [place], [address]  Tel.  [phone]  Fax. [address]  [place], [address] [facility]  Tel.  [phone]  Fax. [phone] [address]   Tel.  [phone]  Fax. [phone]         Subjective:      Rosalba Meyers is an 48 y.o. male referred for evaluation for a sleep disorder. He complains of snoring, snorting, periods of not breathing associated with frequent night time awakenings. Symptoms began several years ago, gradually worsening since that time. He usually can fall asleep in 15 minutes. Family or house members note snoring, periods of not breathing. He denies falling asleep while at work, driving. Rosalba Meyers does wake up frequently at night. He is bothered by waking up too early and left unable to get back to sleep. He actually sleeps about 4 hours at night and wakes up about 3 times during the night. He does work shifts:  First Shift. Noni Stevens indicates he does get too little sleep at night. His bedtime is 2200. He awakens at 0200. He does not take naps  . He has the following observed behaviors: Loud snoring, Light snoring, Pauses in breathing, Sleep talking;  . Other remarks: waking with gasp  He has a  but lost his job last week. [place] Sleepiness Score: 13   which reflect mild daytime drowsiness. No Known Allergies      Current Outpatient Medications:     diclofenac EC (VOLTAREN) 75 mg EC tablet, Take 1 Tab by mouth two (2) times a day., Disp: 60 Tab, Rfl: 1    ENBREL SURECLICK 50 mg/mL (1 mL) injection, 0.98 mL by SubCUTAneous route every seven (7) days for 30 days. , Disp: 4 Each, Rfl: 0    hydroxychloroquine (PLAQUENIL) 200 mg tablet, Take 2 Tabs by mouth daily. , Disp: 60 Tab, Rfl: 6    turmeric 400 mg cap, Take  by mouth., Disp: , Rfl:     fish oil-omega-3 fatty acids (FISH OIL) 340-1,000 mg capsule, Take 1 Cap by mouth daily. , Disp: , Rfl:    cholecalciferol (VITAMIN D3) 1,000 unit tablet, Take  by mouth daily. , Disp: , Rfl:     cyanocobalamin (VITAMIN B-12) 500 mcg tablet, Take 500 mcg by mouth daily. , Disp: , Rfl:     OTHER, Embriel injection one per week, Disp: , Rfl:     chlorthalidone (HYGROTEN) 25 mg tablet, TAKE 1 TABLET BY MOUTH EVERY DAY, Disp: , Rfl: 0    folic acid (FOLVITE) 1 mg tablet, TAKE 1 TABLET BY MOUTH DAILY, Disp: 1 Tab, Rfl: 3    methotrexate (RHEUMATREX) 2.5 mg tablet, Take  by mouth every Sunday. Takes 4 Tablets Every Sunday, Disp: , Rfl:     aspirin delayed-release 81 mg tablet, Take 1 Tab by mouth daily. , Disp: 30 Tab, Rfl: 11    chlorthalidone (HYGROTEN) 25 mg tablet, TAKE 1 TABLET BY MOUTH EVERY DAY, Disp: 90 Tab, Rfl: 0    methylPREDNISolone (MEDROL DOSEPACK) 4 mg tablet, As directed for 6 days one package, Disp: 1 Dose Pack, Rfl: 0    tacrolimus (PROTOPIC) 0.1 % ointment, Apply  to affected area two (2) times a day., Disp: 30 g, Rfl: 6     He  has a past medical history of Cellulitis and abscess of trunk (7/31/2014), Chronic back pain greater than 3 months duration (7/31/2014), Family history of prostate cancer (7/31/2014), Hyperhydrosis disorder (7/31/2014), Hypertension, Intention tremor (12/9/2015), Prediabetes (6/25/2014), RA [facility]rheumatoid arthritis) ([facility] (7/31/2014), Rash of perineum (6/25/2014), Tinea manus (1/19/2016), Tinea pedis of both feet (1/19/2016), and Tobacco abuse (6/25/2014). He  has a past surgical history that includes hx orthopaedic. He family history includes Diabetes in his father and mother; Hypertension in his father and mother; Stroke in his mother. He  reports that he has quit smoking. He smoked 1.00 pack per day. He has never used smokeless tobacco. He reports that he drinks alcohol. He reports that he does not use drugs. Review of Systems:  Constitutional: + weight gain. Eyes:  No blurred vision.   CVS:  No significant chest pain  Pulm:  No significant shortness of breath  GI: No significant nausea or vomiting  :  No significant nocturia  Musculoskeletal:  + significant joint pain at night  Skin:  No significant rashes  Neuro:  No significant dizziness   Psych:  No active mood issues    Sleep Review of Systems: notable for no difficulty falling asleep; +frequent awakenings at night;  some dreaming noted; no nightmares ; no early morning headaches; no memory problems; no concentration issues; no history of any automobile or occupational accidents due to daytime drowsiness. Objective:     Visit Vitals  /75 (BP 1 Location: Left arm, BP Patient Position: Sitting)   Pulse 83   Ht 5' 7\" (1.702 m)   Wt 292 lb (132.5 kg)   SpO2 96%   BMI 45.73 kg/m²         General:   Not in acute distress   Eyes:  Anicteric sclerae, no obvious strabismus   Nose:  No obvious nasal septum deviation    Oropharynx:   Class 3 oropharyngeal outlet, thick tongue base, enlarged and boggy uvula, low-lying soft palate, narrow tonsilo-pharyngeal pilars   Tonsils:   tonsils are present and normal   Neck:   Neck circ. in \"inches\": 20; midline trachea   Chest/Lungs:  Equal lung expansion, clear on auscultation    CVS:  Normal rate, regular rhythm; no JVD   Skin:  Warm to touch; no obvious rashes   Neuro:  No focal deficits ; no obvious tremor    Psych:  Normal affect,  normal countenance;          Assessment:       ICD-10-CM ICD-9-CM    1. Obstructive sleep apnea (adult) (pediatric) G47.33 327.23 SLEEP STUDY UNATTENDED, 4 CHANNEL   2. HTN, goal below 130/80 I10 401.9    3. Prediabetes R73.03 790.29    4. Obesity, morbid ([facility] E66.01 278.01          Plan:     * The patient currently has a High Risk for having sleep apnea. STOP-BANG score 8.  * PSG was ordered for initial evaluation. I have reviewed the different types of sleep studies. Attended sleep studies and home sleep apnea tests. Home sleep testing tests only for the presence and severity of sleep apnea.  he understands that if the HSAT does not provide reliable result(such as poor data/failed HSAT recording), he may have to repeat the HSAT or come in for an attended polysomnogram.   * He was provided information on sleep apnea including coresponding risk factors and the importance of proper treatment. * Counseling was provided regarding proper sleep hygiene and safe driving. Treatment options for sleep apnea were reviewed. he is not against a trial of PAP if found to have significant sleep apnea. I    The treatment plan was reviewed with the patient in detail and reviewed with the patient and the lead technologist. he understands that the lead technologist will be calling him  with the results and assisting with the next step in the treatment plan as outlined today during the consultation with me. All of his questions were addressed. 2. Hypertension - he continues on his current regimen. I have reviewed the relationship between hypertension as it relates to sleep-disordered breathing. 3. Pre- diabetes - he continues on his current regimen. I have reviewed the relationship between sleep disordered breathing as it relates to diabetes. 4. Obesity - I have counseled the patient regarding the benefits of weight loss. Thank you for allowing us to participate in your patient's medical care. We'll keep you updated on these investigations.   Electronically signed by    Tayler Martínez MD  Diplomate in Sleep Medicine  [facility]

## 2019-11-22 ENCOUNTER — TELEPHONE (OUTPATIENT)
Dept: SLEEP MEDICINE | Age: 54
End: 2019-11-22

## 2019-11-22 DIAGNOSIS — G47.33 OBSTRUCTIVE SLEEP APNEA (ADULT) (PEDIATRIC): Primary | ICD-10-CM

## 2019-11-22 NOTE — TELEPHONE ENCOUNTER
Pap assistance program- patient was contacted and he will come into office on Monday to sign assistance forms

## 2019-11-22 NOTE — TELEPHONE ENCOUNTER
Results of sleep study in [employer]  Lead tech to convey results to patient  HSAT results in [employer]. Test positive for severe sleep apnea. AHI 79/hour and lowest oxygen saturation was 65%. We had discussed treatment options at initial consultation. Based on the results of the home sleep apnea test, I believe a trial of APAP would be an effective mode of therapy. APAP order attached. he should be seen in the sleep disorder center 4-6 weeks after initiating PAP therapy. The APAP will have modem access so he can call the sleep center if he  has questions/concerns regarding the initial PAP settings. He should not drive if sleepy and should not drive commercially until on PAP and using it regularly. Front staff to Order PAP and call patient and let them know which DME company they should be hearing from after results reviewed with lead support technologist.   Schedule for first adherence visit in 6 weeks.

## 2019-11-26 ENCOUNTER — TELEPHONE (OUTPATIENT)
Dept: RHEUMATOLOGY | Age: 54
End: 2019-11-26

## 2019-11-26 NOTE — TELEPHONE ENCOUNTER
----- Message from Nadir Hall MD sent at 11/22/2019  9:40 AM EST -----  Regarding: RE: /Telephone  He needs a follow up. He no showed his last appointment. If we have samples of enbrle we can give them.   But we probably need to switch him to something that his insurance will pay for.   ----- Message -----  From: Ruy Mckenzie LPN  Sent: 91/03/1048   1:37 PM EST  To: Nadir Hall MD  Subject: FW: /Telephone                            ----- Message -----  From: Ted Hammonds  Sent: 11/20/2019   1:20 PM EST  To: [facility] Nurse Pool  Subject: /Telephone                              General Message/Vendor Calls    Caller's first and last name: Shannan Huerta      Reason for call: running out samples of the injection      Callback required yes/no and why: yes      Best contact number(s): [phone]      Details to clarify the request: Pt  called requesting a call back in regards to requesting sample of the embriel injection pt only has one left      [facility]

## 2019-11-26 NOTE — TELEPHONE ENCOUNTER
Spoke to pt informed pt per Dr Taj Huggins that he needs a follow up appointment and also Dr Taj Huggins can give him some samples of Enbrel, Dr Taj Huggins will also discuss switching the pt to another medication pt verbally acknowledged understanding

## 2019-12-02 ENCOUNTER — DOCUMENTATION ONLY (OUTPATIENT)
Dept: SLEEP MEDICINE | Age: 54
End: 2019-12-02

## 2019-12-06 ENCOUNTER — OFFICE VISIT (OUTPATIENT)
Dept: RHEUMATOLOGY | Age: 54
End: 2019-12-06

## 2019-12-06 VITALS
RESPIRATION RATE: 16 BRPM | SYSTOLIC BLOOD PRESSURE: 141 MMHG | TEMPERATURE: 98.2 F | DIASTOLIC BLOOD PRESSURE: 95 MMHG | HEART RATE: 90 BPM | WEIGHT: 296.4 LBS | BODY MASS INDEX: 46.42 KG/M2 | OXYGEN SATURATION: 94 %

## 2019-12-06 DIAGNOSIS — M05.742 RHEUMATOID ARTHRITIS INVOLVING BOTH HANDS WITH POSITIVE RHEUMATOID FACTOR (HCC): ICD-10-CM

## 2019-12-06 DIAGNOSIS — M05.741 RHEUMATOID ARTHRITIS INVOLVING BOTH HANDS WITH POSITIVE RHEUMATOID FACTOR (HCC): ICD-10-CM

## 2019-12-06 DIAGNOSIS — M05.79 SEROPOSITIVE RHEUMATOID ARTHRITIS OF MULTIPLE SITES (HCC): Primary | ICD-10-CM

## 2019-12-06 RX ORDER — ETANERCEPT 50 MG/ML
50 SOLUTION SUBCUTANEOUS
Qty: 6 EACH | Refills: 0 | Status: SHIPPED | COMMUNITY
Start: 2019-12-06 | End: 2020-01-05

## 2019-12-06 NOTE — PROGRESS NOTES
RHEUMATOLOGY PROBLEM LIST AND CHIEF COMPLAINT  1. Rheumatoid Arthritis (2015) - Joint pain/swelling, synovitis in hands, wrists, elbows, rheumatoid nodules on elbows, morning stiffness. Elevated ESR, CCP, RF, CRP. Therapy History:  Previous DMARDs: MTX (Past-09/2016, 10/2016 - 12/2017; AEs- nodulosis)   Current DMARDs: Enbrel (11/2015-current), Plaquenil (2015 - 2017, 3/2018-current)    2. Shoulder OA and rotator cuff disease    INTERVAL HISTORY  This is a 48 y.o.  male. Today, the patient complains of pain in the joints. Location: knee, hip  Severity:  10 on a scale of 0-10  Timing: all day  Duration:  4 months  Context/Associated signs and symptoms: today the patient reports that he has been laid off at work and has no insurance coverage at the moment. States that the left rotator cuff remains symptomatic. Nodules are still present. Notes that the cortisone injections provided good relief of pain.      RHEUMATOLOGY REVIEW OF SYSTEMS   Positives as per history  Negatives as follows:  Lay Saldivar:  Denies unexplained persistent fevers, weight change, chronic fatigue  HEAD/EYES:   Denies eye redness, blurry vision or sudden loss of vision, dry eyes, HA, temporal artery pain  ENT:    Denies oral/nasal ulcers, recurrent sinus infections, dry mouth  RESPIRATORY:  No pleuritic pain, history of pleural effusions, hemoptysis, exertional dyspnea  CARDIOVASCULAR:  Denies chest pain, history of pericardial effusions  GASTRO:   Denies heartburn, esophageal dysmotility, abdominal pain, nausea, vomiting, diarrhea, blood in the stool  HEMATOLOGIC:  No easy bruising, purpura, swollen lymph nodes  SKIN:    Denies alopecia, ulcers, nodules, sun sensitivity, unexplained persistent rash   VASCULAR:   Denies edema, cyanosis, raynaud phenomenon  NEUROLOGIC:  Denies specific muscle weakness, paresthesias   PSYCHIATRIC:  No sleep disturbance / snoring, depression, anxiety  MSK:    No morning stiffness >1 hour, SI joint pain, persistent joint swelling    PAST MEDICAL HISTORY  Reviewed with patient, significant changes in medical history - no    PHYSICAL EXAM  Blood pressure (!) 141/95, pulse 90, temperature 98.2 °F (36.8 °C), temperature source Oral, resp. rate 16, weight 296 lb 6.4 oz (134.4 kg), SpO2 94 %. GENERAL APPEARANCE: Well-nourished, no acute distress  NECK: No adenopathy  ENT: No oral ulcers  CARDIOVASCULAR: Heart rhythm is regular. No murmur, rub, gallop  CHEST: Normal vesicular breath sounds. No wheezes, rales, pleural friction rubs  ABDOMINAL: The abdomen is soft and nontender. Bowel sounds are normal  SKIN: No rash, palpable purpura, digital ulcer, abnormal thickening   MUSCULOSKELETAL: Rotator cuff signs on the left - unchanged, Antalgic gait  Upper extremities -  2 Nodules noted over left elbow - Left elbow nodule became larger. No synovitis. Some synovial thickening in the right 3rd digit, MCP & PIP. Lower extremities - B/L Knee crepitus, bony prominence (R>L), scar over right knee from previous surgery. Mild effusion right knee. LABS, RADIOLOGY AND PROCEDURES   Previous labs reviewed -Yes  Last TB 12/2017    ASSESSMENT  1. Rheumatoid Arthritis (Established problem -  Stable disease) - His disease continues to be stable. He should continue with his medications Plaquenil 400 mg daily and Enbrel 50 mg weekly. Given his insurance status change I will provide the patient with samples. 2. Drug therapy monitoring for toxicity (methotrexate/Enbrel) - CBC, BUN, Cr, AST, ALT and albumin every 4 months  3. Osteoarthritis - His complaints today are from OA. I will give him a steroid injection to help improve his knee pain and mobility. I will refer him to pain management for his sciatica. He should continue with diclofenac PRN and weight loss. PLAN  1. Enbrel 50 mg weekly; samples given today  2. Plaquenil 400 mg daily  3. Return in 4 months    Refugio Guerrero MD  Adult and Pediatric Rheumatology     [address] [facility], [address] [facility], [address], Phone [phone], Fax [phone]   E-mail: [email or web address]    Visiting  of Pediatrics    Department of Pediatrics, [facility] of [address] [place] [address], [address] [place] [address], Phone [phone], Fax [phone]  E-mail: [email or web address]    There are no Patient Instructions on file for this visit. cc:  Etta Cole MD    Written by Duwayne Litten, scribe, as dictated by Fatmata Lugo.  Maren Kulkarni M.D

## 2019-12-06 NOTE — PROGRESS NOTES
Chief Complaint   Patient presents with    Joint Pain     1. Have you been to the ER, urgent care clinic since your last visit? Hospitalized since your last visit? No    2. Have you seen or consulted any other health care providers outside of the [address] [place] [address] since your last visit? Include any pap smears or colon screening.  No

## 2020-01-24 ENCOUNTER — TELEPHONE (OUTPATIENT)
Dept: RHEUMATOLOGY | Age: 55
End: 2020-01-24

## 2020-01-24 NOTE — TELEPHONE ENCOUNTER
----- Message from Akash Nick sent at 1/24/2020  9:47 AM EST -----  Regarding: FW: Dr. Farzad Trevino    ----- Message -----  From: Sofia Carr  Sent: 1/24/2020   9:33 AM EST  To: [facility] Nurse Pool  Subject: Dr. Brent Hilario Message/Vendor Calls    Caller's first and last name:Pt      Reason for call: Requesting simples for RX Enbrel, stated took last injection on 01/17/2020.        Callback required yes/no and why:Yes      Best contact number(s):[phone]      Details to clarify the request:      Silvano Suárez

## 2020-01-24 NOTE — TELEPHONE ENCOUNTER
Spoke to pt informed pt per Dr Kate Quevedo that pt can  samples of enbrel pt verbally acknowledged understanding

## 2020-01-28 RX ORDER — ETANERCEPT 50 MG/ML
50 SOLUTION SUBCUTANEOUS
Qty: 6 EACH | Refills: 0 | Status: SHIPPED | COMMUNITY
Start: 2020-01-28 | End: 2020-02-27

## 2020-01-30 ENCOUNTER — OFFICE VISIT (OUTPATIENT)
Dept: FAMILY MEDICINE CLINIC | Age: 55
End: 2020-01-30

## 2020-01-30 VITALS
RESPIRATION RATE: 20 BRPM | HEART RATE: 87 BPM | HEIGHT: 67 IN | BODY MASS INDEX: 45.04 KG/M2 | SYSTOLIC BLOOD PRESSURE: 115 MMHG | TEMPERATURE: 97.2 F | OXYGEN SATURATION: 95 % | DIASTOLIC BLOOD PRESSURE: 86 MMHG | WEIGHT: 287 LBS

## 2020-01-30 DIAGNOSIS — M25.561 CHRONIC PAIN OF RIGHT KNEE: Primary | ICD-10-CM

## 2020-01-30 DIAGNOSIS — G89.29 CHRONIC PAIN OF RIGHT KNEE: Primary | ICD-10-CM

## 2020-01-30 RX ORDER — METHYLPREDNISOLONE 4 MG/1
TABLET ORAL
Qty: 1 DOSE PACK | Refills: 0 | Status: SHIPPED | OUTPATIENT
Start: 2020-01-30 | End: 2020-05-14 | Stop reason: ALTCHOICE

## 2020-01-30 RX ORDER — HYDROCODONE BITARTRATE AND ACETAMINOPHEN 5; 325 MG/1; MG/1
1 TABLET ORAL
Qty: 20 TAB | Refills: 0 | Status: SHIPPED | OUTPATIENT
Start: 2020-01-30 | End: 2020-02-06

## 2020-01-30 RX ORDER — INDOMETHACIN 50 MG/1
50 CAPSULE ORAL 3 TIMES DAILY
Qty: 18 CAP | Refills: 0 | Status: SHIPPED | OUTPATIENT
Start: 2020-01-30 | End: 2020-02-05

## 2020-01-30 NOTE — PROGRESS NOTES
Name and  verified        Chief Complaint   Patient presents with    Knee Swelling     Right    Nasal Pain     patient stated for two weeks and OTC products are non effective         Health Maintenance reviewed-discussed with patient. 1. Have you been to the ER, urgent care clinic since your last visit? Hospitalized since your last visit? YES, RHEUMATOLOGY OFFICE VISIT 2019.    2. Have you seen or consulted any other health care providers outside of the [address] [place] [address] since your last visit? Include any pap smears or colon screening.  NO.

## 2020-01-30 NOTE — PROGRESS NOTES
HISTORY OF PRESENT ILLNESS  Jessika Clark is a 48 y.o. male. HPI   Knee pain   The history is provided by the patient. This is a chronic problem. Episode onset: 6 yrs ago,  the patient is not able to do any walk for >1-2 blocks, using the walker for the mobility , the pain worsens with working mopping, and any going up and down the steps and it is becoming to be constantly regardless the pain intensity has changed and worsening since onset. Patient states that it is a dull nonradiating no numbness no tingling sensation disabling, morning stiffness which gets better with activity and with the severity of 10/10. in addition stating that current pain is also aggravated by movement and palpation. So for pt denies any history of extremity trauma,   Patient states that the pain is significantly high has been taking all available pain medication over-the-counter not has been helping the right knee swelled up after gentle scar which is healed walk with a cane stating that he needs to go to emergency room to get some pain medication  Current Outpatient Medications   Medication Sig Dispense Refill    OTHER C-PAP MACHINE WHILE SLEEPING      ENBREL SURECLICK 50 mg/mL (1 mL) injection 0.98 mL by SubCUTAneous route every seven (7) days for 30 days. 6 Each 0    diclofenac EC (VOLTAREN) 75 mg EC tablet Take 1 Tab by mouth two (2) times a day. 60 Tab 1    chlorthalidone (HYGROTEN) 25 mg tablet TAKE 1 TABLET BY MOUTH EVERY DAY 90 Tab 0    hydroxychloroquine (PLAQUENIL) 200 mg tablet Take 2 Tabs by mouth daily. 60 Tab 6    turmeric 400 mg cap Take  by mouth daily.  fish oil-omega-3 fatty acids (FISH OIL) 340-1,000 mg capsule Take 1 Cap by mouth daily.  cholecalciferol (VITAMIN D3) 1,000 unit tablet Take  by mouth daily.  cyanocobalamin (VITAMIN B-12) 500 mcg tablet Take 500 mcg by mouth daily.       OTHER Embriel injection one per week      chlorthalidone (HYGROTEN) 25 mg tablet TAKE 1 TABLET BY MOUTH EVERY DAY  0    folic acid (FOLVITE) 1 mg tablet TAKE 1 TABLET BY MOUTH DAILY 1 Tab 3    methotrexate (RHEUMATREX) 2.5 mg tablet Take  by mouth every Sunday. Takes 4 Tablets Every Sunday      tacrolimus (PROTOPIC) 0.1 % ointment Apply  to affected area two (2) times a day. 30 g 6    aspirin delayed-release 81 mg tablet Take 1 Tab by mouth daily. 30 Tab 11     No Known Allergies  Past Medical History:   Diagnosis Date    Cellulitis and abscess of trunk 7/31/2014    Chronic back pain greater than 3 months duration 7/31/2014    Family history of prostate cancer 7/31/2014    Hyperhydrosis disorder 7/31/2014    Hypertension     Intention tremor 12/9/2015    Prediabetes 6/25/2014    RA (rheumatoid arthritis) ([facility] 7/31/2014    Rash of perineum 6/25/2014    Tinea manus 1/19/2016    Tinea pedis of both feet 1/19/2016    Tobacco abuse 6/25/2014     Past Surgical History:   Procedure Laterality Date    HX ORTHOPAEDIC      right leg and knee     Family History   Problem Relation Age of Onset    Diabetes Mother     Hypertension Mother     Stroke Mother     Diabetes Father     Hypertension Father      Social History     Tobacco Use    Smoking status: Former Smoker     Packs/day: 1.00    Smokeless tobacco: Never Used   Substance Use Topics    Alcohol use: Yes     Comment: occasionally      Lab Results   Component Value Date/Time    Hemoglobin A1c 6.4 (H) 07/17/2019 03:38 PM    Hemoglobin A1c 5.5 01/19/2016 12:26 PM    Hemoglobin A1c 5.7 (H) 06/25/2014 12:19 PM    Glucose 93 07/17/2019 03:38 PM    LDL, calculated 66 07/17/2019 03:38 PM    Creatinine 0.97 07/17/2019 03:38 PM         Review of Systems   Constitutional: Negative for chills and fever. HENT: Negative for ear pain and nosebleeds. Eyes: Negative for blurred vision, pain and discharge. Respiratory: Negative for shortness of breath. Cardiovascular: Negative for chest pain and leg swelling.    Gastrointestinal: Negative for constipation, diarrhea, nausea and vomiting. Genitourinary: Negative for frequency. Musculoskeletal: Positive for back pain, joint pain and myalgias. Skin: Negative for itching and rash. Neurological: Positive for weakness and headaches. Psychiatric/Behavioral: Negative for depression. The patient has insomnia. The patient is not nervous/anxious. Physical Exam  Vitals signs and nursing note reviewed. Constitutional:       Appearance: He is well-developed. HENT:      Head: Normocephalic and atraumatic. Mouth/Throat:      Pharynx: No oropharyngeal exudate. Eyes:      Conjunctiva/sclera: Conjunctivae normal.      Pupils: Pupils are equal, round, and reactive to light. Neck:      Musculoskeletal: Normal range of motion and neck supple. Thyroid: No thyromegaly. Vascular: No JVD. Cardiovascular:      Rate and Rhythm: Normal rate and regular rhythm. Heart sounds: Normal heart sounds. No murmur. No friction rub. Pulmonary:      Effort: Pulmonary effort is normal. No respiratory distress. Breath sounds: Normal breath sounds. No wheezing or rales. Abdominal:      General: Bowel sounds are normal. There is no distension. Palpations: Abdomen is soft. Tenderness: There is no abdominal tenderness. Musculoskeletal:         General: Swelling, tenderness, deformity and signs of injury present. Right knee: He exhibits decreased range of motion and swelling. Tenderness found. Left knee: He exhibits decreased range of motion and abnormal alignment. He exhibits normal meniscus and no MCL laxity. Tenderness found. Patellar tendon tenderness noted. No medial joint line, no lateral joint line, no MCL and no LCL tenderness noted. Left hand: He exhibits no tenderness. Lymphadenopathy:      Cervical: No cervical adenopathy. Skin:     General: Skin is warm. Findings: No erythema or rash.    Neurological:      Mental Status: He is alert and oriented to person, place, and time. Deep Tendon Reflexes: Reflexes are normal and symmetric. Psychiatric:         Behavior: Behavior normal.         ASSESSMENT and PLAN  Diagnoses and all orders for this visit:    1. Chronic pain of right knee  -     methylPREDNISolone (MEDROL DOSEPACK) 4 mg tablet; As directed for 6 days one package  -     indomethacin (INDOCIN) 50 mg capsule; Take 1 Cap by mouth three (3) times daily for 6 days.  -     HYDROcodone-acetaminophen (NORCO) 5-325 mg per tablet; Take 1 Tab by mouth every eight (8) hours as needed for Pain for up to 7 days. Max Daily Amount: 3 Tabs. Patient was told to lessen the amount of pain medication continue with weight reduction do some massage therapy chiropractor exercise therapy such as resistance banding avoid heavy lifting heavy pushing at this time include ibuprofen and Tylenol over-the-counter topical cream , patient was told to take some Tums or over-the-counter PPI if abdominal upset do ice therapy, side effect of current pain medication significantly including its devastating addiction, explained in detail   in addition, pt was told to avoid machinary operation and driving while on any pain based medications that will cause dizziness, drowsiness, and sleepiness.   Dependency and tolerancy were also addressed,  meds side effects and compliancy advised,  Call or rtc if worsens, radiology results and schedule of future radiology studies reviewed with patient, patient was also told to avoid any alcoholic beverages or any new medication which could potentiate its effect possibility of overdose abuse of other illicit drug keeping the medication is safe place, patient acknowledged understanding and agreed with recommendation

## 2020-01-30 NOTE — PATIENT INSTRUCTIONS
Safe Use of Opioid Pain Medicine: Care Instructions  Your Care Instructions  Pain is your body's way of warning you that something is wrong. Pain feels different for everybody. Only you can describe your pain. A doctor can suggest or prescribe many types of medicines for pain. These range from over-the-counter medicines like acetaminophen (Tylenol) to powerful medicines called opioids. Examples of opioids are fentanyl, hydrocodone, morphine, and oxycodone. Heroin is an illegal opioid  Opioids are strong medicines. They can help you manage pain when you use them the right way. But if you misuse them, they can cause serious harm and even death. For these reasons, doctors are very careful about how they prescribe opioids. If you decide to take opioids, here are some things to remember. · Keep your doctor informed. You can develop opioid use disorder. Moderate to severe opioid use disorder is sometimes called addiction. The risk is higher if you have a history of substance use. Your doctor will monitor you closely for signs of opioid use disorder and to figure out when you no longer need to take opioids. · Make a treatment plan. The goal of your plan is to be able to function and do the things you need to do, even if you still have some pain. You might be able to manage your pain with other non-opioid options like physical therapy, relaxation, or over-the-counter pain medicines. · Be aware of the side effects. Opioids can cause serious side effects, such as constipation, dry mouth, and nausea. And over time, you may need a higher dose to get pain relief. This is called tolerance. Your body also gets used to opioids. This is called physical dependence. If you suddenly stop taking them, you may have withdrawal symptoms. The doctor carefully considered what pain medicine is right for you.  You may not have received opioids if your doctor was concerned about drug interactions or your safety, or if he or she had other concerns. It is best to have one doctor or clinic treat your pain. This way you will get the pain medicine that will help you the most. And a doctor will be able to watch for any problems that the medicine might cause. The doctor has checked you carefully, but problems can develop later. If you notice any problems or new symptoms,  get medical treatment right away. Follow-up care is a key part of your treatment and safety. Be sure to make and go to all appointments, and call your doctor if you are having problems. It's also a good idea to know your test results and keep a list of the medicines you take. How can you care for yourself at home? If you need to take opioids to manage your pain, remember these safety tips. · Follow directions carefully. It's easy to misuse opioids if you take a dose other than what's prescribed by your doctor. This can lead to overdose and even death. Even sharing them with someone they weren't meant for is misuse. · Be cautious. Opioids may affect your judgment and decision making. Do not drive or operate machinery until you can think clearly. Talk with your doctor about when it is safe to drive. · Reduce the risk of drug interactions. Opioids can be dangerous if you take them with alcohol or with certain drugs like sleeping pills and muscle relaxers. Make sure your doctor knows about all the other medicines you take, including over-the-counter medicines. Don't start any new medicines before you talk to your doctor or pharmacist.  · Safely store and dispose of opioids. Store opioids in a safe and secure place. Make sure that pets, children, friends, and family can't get to them. When you're done using opioids, make sure to dispose of them safely and as quickly as possible. The U.S. Food and Drug Administration (FDA) recommends these disposal options.   ? The best option is to take your medicine to a drop-off box or take-back program that is authorized by the U.S. Drug Enforcement Administration (DOROTHY). ? If these programs aren't available in your area and your medicine doesn't have specific disposal instructions (such as flushing), you can throw them into your household trash if you follow the FDA's instructions. Visit fda.gov and search for \"unused medicine disposal.\"  ? If you have opioid patches (used or unused), your options are to take them to a DOROTHY-authorized site or flush them down the toilet. Do not throw them in the trash. ? Only flush your medicine down the toilet if you can't get to a DOROTHY-approved site or your medicine instructions state clearly to flush them. · Reduce the risk of overdose. Misuse of opioids can be very dangerous. Protect yourself by asking your doctor about a naloxone rescue kit. It can help you--and even save your life--if you take too much of an opioid. Try other ways to reduce pain. · Relax, and reduce stress. Relaxation techniques such as deep breathing or meditation can help. · Keep moving. Gentle, daily exercise can help reduce pain over the long run. Try low- or no-impact exercises such as walking, swimming, and stationary biking. Do stretches to stay flexible. · Try heat, cold packs, and massage. · Get enough sleep. Pain can make you tired and drain your energy. Talk with your doctor if you have trouble sleeping because of pain. · Think positive. Your thoughts can affect your pain level. Do things that you enjoy to distract yourself when you have pain instead of focusing on the pain. See a movie, read a book, listen to music, or spend time with a friend. If you are not taking a prescription pain medicine, ask your doctor if you can take an over-the-counter medicine. When should you call for help?   Call your doctor now or seek immediate medical care if:    · You have a new kind of pain.     · You have new symptoms, such as a fever or rash, along with the pain.    Watch closely for changes in your health, and be sure to contact your doctor if:    · You think you might be using too much pain medicine, and you need help to use less or stop.     · Your pain gets worse.     · You would like a referral to a doctor or clinic that specializes in pain management. Where can you learn more? Go to http://jada-moreno.info/. Enter R108 in the search box to learn more about \"Safe Use of Opioid Pain Medicine: Care Instructions. \"  Current as of: March 28, 2019  Content Version: 12.2  © 0566-7414 [employer]. Care instructions adapted under license by [employer] (which disclaims liability or warranty for this information). If you have questions about a medical condition or this instruction, always ask your healthcare professional. [address] any warranty or liability for your use of this information. Knee Pain or Injury: Care Instructions  Your Care Instructions    Injuries are a common cause of knee problems. Sudden (acute) injuries may be caused by a direct blow to the knee. They can also be caused by abnormal twisting, bending, or falling on the knee. Pain, bruising, or swelling may be severe, and may start within minutes of the injury. Overuse is another cause of knee pain. Other causes are climbing stairs, kneeling, and other activities that use the knee. Everyday wear and tear, especially as you get older, also can cause knee pain. Rest, along with home treatment, often relieves pain and allows your knee to heal. If you have a serious knee injury, you may need tests and treatment. Follow-up care is a key part of your treatment and safety. Be sure to make and go to all appointments, and call your doctor if you are having problems. It's also a good idea to know your test results and keep a list of the medicines you take. How can you care for yourself at home? · Be safe with medicines. Read and follow all instructions on the label.   ? If the doctor gave you a prescription medicine for pain, take it as prescribed. ? If you are not taking a prescription pain medicine, ask your doctor if you can take an over-the-counter medicine. · Rest and protect your knee. Take a break from any activity that may cause pain. · Put ice or a cold pack on your knee for 10 to 20 minutes at a time. Put a thin cloth between the ice and your skin. · Prop up a sore knee on a pillow when you ice it or anytime you sit or lie down for the next 3 days. Try to keep it above the level of your heart. This will help reduce swelling. · If your knee is not swollen, you can put moist heat, a heating pad, or a warm cloth on your knee. · If your doctor recommends an elastic bandage, sleeve, or other type of support for your knee, wear it as directed. · Follow your doctor's instructions about how much weight you can put on your leg. Use a cane, crutches, or a walker as instructed. · Follow your doctor's instructions about activity during your healing process. If you can do mild exercise, slowly increase your activity. · Reach and stay at a healthy weight. Extra weight can strain the joints, especially the knees and hips, and make the pain worse. Losing even a few pounds may help. When should you call for help? Call 911 anytime you think you may need emergency care. For example, call if:    · You have symptoms of a blood clot in your lung (called a pulmonary embolism). These may include:  ? Sudden chest pain. ? Trouble breathing. ? Coughing up blood.    Call your doctor now or seek immediate medical care if:    · You have severe or increasing pain.     · Your leg or foot turns cold or changes color.     · You cannot stand or put weight on your knee.     · Your knee looks twisted or bent out of shape.     · You cannot move your knee.     · You have signs of infection, such as:  ? Increased pain, swelling, warmth, or redness. ? Red streaks leading from the knee. ? Pus draining from a place on your knee. ?  A fever.     · You have signs of a blood clot in your leg (called a deep vein thrombosis), such as:  ? Pain in your calf, back of the knee, thigh, or groin. ? Redness and swelling in your leg or groin.    Watch closely for changes in your health, and be sure to contact your doctor if:    · You have tingling, weakness, or numbness in your knee.     · You have any new symptoms, such as swelling.     · You have bruises from a knee injury that last longer than 2 weeks.     · You do not get better as expected. Where can you learn more? Go to http://jada-moreno.info/. Enter K195 in the search box to learn more about \"Knee Pain or Injury: Care Instructions. \"  Current as of: June 26, 2019  Content Version: 12.2  © 3063-7633 [employer]. Care instructions adapted under license by [employer] (which disclaims liability or warranty for this information). If you have questions about a medical condition or this instruction, always ask your healthcare professional. [place] [address] any warranty or liability for your use of this information.

## 2020-02-17 ENCOUNTER — HOSPITAL ENCOUNTER (EMERGENCY)
Age: 55
Discharge: HOME | End: 2020-02-17
Attending: EMERGENCY MEDICINE
Payer: MEDICAID

## 2020-02-17 VITALS
OXYGEN SATURATION: 95 % | WEIGHT: 270.73 LBS | DIASTOLIC BLOOD PRESSURE: 94 MMHG | RESPIRATION RATE: 16 BRPM | SYSTOLIC BLOOD PRESSURE: 121 MMHG | BODY MASS INDEX: 42.49 KG/M2 | TEMPERATURE: 98 F | HEIGHT: 67 IN | HEART RATE: 96 BPM

## 2020-02-17 DIAGNOSIS — E11.9 TYPE 2 DIABETES MELLITUS WITHOUT COMPLICATION, WITHOUT LONG-TERM CURRENT USE OF INSULIN (HCC): Primary | ICD-10-CM

## 2020-02-17 DIAGNOSIS — R73.9 HYPERGLYCEMIA: ICD-10-CM

## 2020-02-17 LAB
ALBUMIN SERPL-MCNC: 3.8 G/DL (ref 3.5–5)
ALBUMIN/GLOB SERPL: 0.8 {RATIO} (ref 1.1–2.2)
ALP SERPL-CCNC: 105 U/L (ref 45–117)
ALT SERPL-CCNC: 48 U/L (ref 12–78)
ANION GAP SERPL CALC-SCNC: 10 MMOL/L (ref 5–15)
APPEARANCE UR: CLEAR
AST SERPL-CCNC: 25 U/L (ref 15–37)
BACTERIA URNS QL MICRO: NEGATIVE /HPF
BASOPHILS # BLD: 0 K/UL (ref 0–0.1)
BASOPHILS NFR BLD: 0 % (ref 0–1)
BILIRUB SERPL-MCNC: 0.7 MG/DL (ref 0.2–1)
BILIRUB UR QL: NEGATIVE
BUN SERPL-MCNC: 18 MG/DL (ref 6–20)
BUN/CREAT SERPL: 13 (ref 12–20)
CALCIUM SERPL-MCNC: 8.9 MG/DL (ref 8.5–10.1)
CHLORIDE SERPL-SCNC: 94 MMOL/L (ref 97–108)
CO2 SERPL-SCNC: 28 MMOL/L (ref 21–32)
COLOR UR: ABNORMAL
CREAT SERPL-MCNC: 1.4 MG/DL (ref 0.7–1.3)
DIFFERENTIAL METHOD BLD: ABNORMAL
EOSINOPHIL # BLD: 0.1 K/UL (ref 0–0.4)
EOSINOPHIL NFR BLD: 1 % (ref 0–7)
EPITH CASTS URNS QL MICRO: ABNORMAL /LPF
ERYTHROCYTE [DISTWIDTH] IN BLOOD BY AUTOMATED COUNT: 13.6 % (ref 11.5–14.5)
GLOBULIN SER CALC-MCNC: 4.5 G/DL (ref 2–4)
GLUCOSE BLD STRIP.AUTO-MCNC: 276 MG/DL (ref 65–100)
GLUCOSE BLD STRIP.AUTO-MCNC: 330 MG/DL (ref 65–100)
GLUCOSE BLD STRIP.AUTO-MCNC: 379 MG/DL (ref 65–100)
GLUCOSE SERPL-MCNC: 366 MG/DL (ref 65–100)
GLUCOSE UR STRIP.AUTO-MCNC: >1000 MG/DL
HCT VFR BLD AUTO: 44.1 % (ref 36.6–50.3)
HGB BLD-MCNC: 15.2 G/DL (ref 12.1–17)
HGB UR QL STRIP: NEGATIVE
HYALINE CASTS URNS QL MICRO: ABNORMAL /LPF (ref 0–5)
IMM GRANULOCYTES # BLD AUTO: 0 K/UL (ref 0–0.04)
IMM GRANULOCYTES NFR BLD AUTO: 0 % (ref 0–0.5)
KETONES UR QL STRIP.AUTO: 15 MG/DL
LEUKOCYTE ESTERASE UR QL STRIP.AUTO: NEGATIVE
LYMPHOCYTES # BLD: 3.2 K/UL (ref 0.8–3.5)
LYMPHOCYTES NFR BLD: 37 % (ref 12–49)
MCH RBC QN AUTO: 27 PG (ref 26–34)
MCHC RBC AUTO-ENTMCNC: 34.5 G/DL (ref 30–36.5)
MCV RBC AUTO: 78.5 FL (ref 80–99)
MONOCYTES # BLD: 0.8 K/UL (ref 0–1)
MONOCYTES NFR BLD: 9 % (ref 5–13)
NEUTS SEG # BLD: 4.5 K/UL (ref 1.8–8)
NEUTS SEG NFR BLD: 53 % (ref 32–75)
NITRITE UR QL STRIP.AUTO: NEGATIVE
NRBC # BLD: 0 K/UL (ref 0–0.01)
NRBC BLD-RTO: 0 PER 100 WBC
PH UR STRIP: 5.5 [PH] (ref 5–8)
PLATELET # BLD AUTO: 273 K/UL (ref 150–400)
PMV BLD AUTO: 11 FL (ref 8.9–12.9)
POTASSIUM SERPL-SCNC: 3.3 MMOL/L (ref 3.5–5.1)
PROT SERPL-MCNC: 8.3 G/DL (ref 6.4–8.2)
PROT UR STRIP-MCNC: NEGATIVE MG/DL
RBC # BLD AUTO: 5.62 M/UL (ref 4.1–5.7)
RBC #/AREA URNS HPF: ABNORMAL /HPF (ref 0–5)
SERVICE CMNT-IMP: ABNORMAL
SODIUM SERPL-SCNC: 132 MMOL/L (ref 136–145)
SP GR UR REFRACTOMETRY: 1.01 (ref 1–1.03)
UA: UC IF INDICATED,UAUC: ABNORMAL
UROBILINOGEN UR QL STRIP.AUTO: 0.2 EU/DL (ref 0.2–1)
WBC # BLD AUTO: 8.6 K/UL (ref 4.1–11.1)
WBC URNS QL MICRO: ABNORMAL /HPF (ref 0–4)

## 2020-02-17 PROCEDURE — 74011250636 HC RX REV CODE- 250/636: Performed by: EMERGENCY MEDICINE

## 2020-02-17 PROCEDURE — 81001 URINALYSIS AUTO W/SCOPE: CPT

## 2020-02-17 PROCEDURE — 96374 THER/PROPH/DIAG INJ IV PUSH: CPT

## 2020-02-17 PROCEDURE — 85025 COMPLETE CBC W/AUTO DIFF WBC: CPT

## 2020-02-17 PROCEDURE — 82962 GLUCOSE BLOOD TEST: CPT

## 2020-02-17 PROCEDURE — 80053 COMPREHEN METABOLIC PANEL: CPT

## 2020-02-17 PROCEDURE — 74011636637 HC RX REV CODE- 636/637: Performed by: EMERGENCY MEDICINE

## 2020-02-17 PROCEDURE — 36415 COLL VENOUS BLD VENIPUNCTURE: CPT

## 2020-02-17 PROCEDURE — 99284 EMERGENCY DEPT VISIT MOD MDM: CPT

## 2020-02-17 RX ORDER — METFORMIN HYDROCHLORIDE 500 MG/1
500 TABLET ORAL 2 TIMES DAILY WITH MEALS
Qty: 60 TAB | Refills: 0 | Status: SHIPPED | OUTPATIENT
Start: 2020-02-17 | End: 2020-02-20 | Stop reason: SDUPTHER

## 2020-02-17 RX ADMIN — HUMAN INSULIN 10 UNITS: 100 INJECTION, SOLUTION SUBCUTANEOUS at 10:11

## 2020-02-17 RX ADMIN — SODIUM CHLORIDE 1000 ML: 900 INJECTION, SOLUTION INTRAVENOUS at 08:56

## 2020-02-17 RX ADMIN — SODIUM CHLORIDE 1000 ML: 900 INJECTION, SOLUTION INTRAVENOUS at 08:16

## 2020-02-17 NOTE — ED PROVIDER NOTES
EMERGENCY DEPARTMENT HISTORY AND PHYSICAL EXAM      Date: 2/17/2020  Patient Name: Betty Garrido    History of Presenting Illness     Chief Complaint   Patient presents with    Urinary Frequency     along with increased thirst x 2 weeks, denies hx of DM, but states that checks his sugar regularly and it was elevated this morning over 300       History Provided By: Patient    HPI: Betty Garrido, 48 y.o. male with PMHx significant for hypertension, RA, prediabetes, who presents with a chief complaint of polyuria and polydipsia. Patient states that he has had ongoing symptoms for last few weeks, however checked his blood sugar at home this morning as he has a history of prediabetes and has a meter and noted to be elevated in the 300s. Patient states that about a month ago he began a \"lemon water diet\" which has decreased his appetite he actually has lost some weight. However, notes that he has had urinary frequency for last several weeks and has had a dry mouth for about the last week. No burning with urination, abdominal pain, vomiting, diarrhea, chest pain, shortness of breath. PCP: Cornell Oshea MD    There are no other complaints, changes, or physical findings at this time. Current Outpatient Medications   Medication Sig Dispense Refill    metFORMIN (GLUCOPHAGE) 500 mg tablet Take 1 Tab by mouth two (2) times daily (with meals). 60 Tab 0    OTHER C-PAP MACHINE WHILE SLEEPING      methylPREDNISolone (MEDROL DOSEPACK) 4 mg tablet As directed for 6 days one package 1 Dose Pack 0    ENBREL SURECLICK 50 mg/mL (1 mL) injection 0.98 mL by SubCUTAneous route every seven (7) days for 30 days. 6 Each 0    diclofenac EC (VOLTAREN) 75 mg EC tablet Take 1 Tab by mouth two (2) times a day. 60 Tab 1    chlorthalidone (HYGROTEN) 25 mg tablet TAKE 1 TABLET BY MOUTH EVERY DAY 90 Tab 0    hydroxychloroquine (PLAQUENIL) 200 mg tablet Take 2 Tabs by mouth daily.  60 Tab 6    turmeric 400 mg cap Take  by mouth daily.  fish oil-omega-3 fatty acids (FISH OIL) 340-1,000 mg capsule Take 1 Cap by mouth daily.  cholecalciferol (VITAMIN D3) 1,000 unit tablet Take  by mouth daily.  cyanocobalamin (VITAMIN B-12) 500 mcg tablet Take 500 mcg by mouth daily.  OTHER Embriel injection one per week      chlorthalidone (HYGROTEN) 25 mg tablet TAKE 1 TABLET BY MOUTH EVERY DAY  0    folic acid (FOLVITE) 1 mg tablet TAKE 1 TABLET BY MOUTH DAILY 1 Tab 3    methotrexate (RHEUMATREX) 2.5 mg tablet Take  by mouth every Sunday. Takes 4 Tablets Every Sunday      tacrolimus (PROTOPIC) 0.1 % ointment Apply  to affected area two (2) times a day. 30 g 6    aspirin delayed-release 81 mg tablet Take 1 Tab by mouth daily. 27 Tab 11     Past History     Past Medical History:  Past Medical History:   Diagnosis Date    Cellulitis and abscess of trunk 7/31/2014    Chronic back pain greater than 3 months duration 7/31/2014    Family history of prostate cancer 7/31/2014    Hyperhydrosis disorder 7/31/2014    Hypertension     Intention tremor 12/9/2015    Prediabetes 6/25/2014    RA (rheumatoid arthritis) ([facility] [address] 7/31/2014    Rash of perineum 6/25/2014    Tinea manus 1/19/2016    Tinea pedis of both feet 1/19/2016    Tobacco abuse 6/25/2014     Past Surgical History:  Past Surgical History:   Procedure Laterality Date    HX ORTHOPAEDIC      right leg and knee     Family History:  Family History   Problem Relation Age of Onset    Diabetes Mother     Hypertension Mother     Stroke Mother     Diabetes Father     Hypertension Father      Social History:  Social History     Tobacco Use    Smoking status: Former Smoker     Packs/day: 1.00    Smokeless tobacco: Never Used   Substance Use Topics    Alcohol use: Yes     Comment: occasionally    Drug use: No     Allergies:  No Known Allergies  Review of Systems   Review of Systems   Constitutional: Negative for chills and fever.    HENT: Negative for congestion, rhinorrhea and sore throat. Respiratory: Negative for cough and shortness of breath. Cardiovascular: Negative for chest pain. Gastrointestinal: Negative for abdominal pain, nausea and vomiting. Endocrine: Positive for polydipsia and polyuria. Genitourinary: Positive for frequency. Negative for dysuria and urgency. Skin: Negative for rash. Neurological: Negative for dizziness, light-headedness and headaches. All other systems reviewed and are negative. Physical Exam   Physical Exam  Vitals signs and nursing note reviewed. Constitutional:       General: He is not in acute distress. Appearance: He is well-developed. HENT:      Head: Normocephalic and atraumatic. Mouth/Throat:      Mouth: Mucous membranes are dry. Eyes:      Conjunctiva/sclera: Conjunctivae normal.      Pupils: Pupils are equal, round, and reactive to light. Neck:      Musculoskeletal: Normal range of motion. Cardiovascular:      Rate and Rhythm: Normal rate and regular rhythm. Pulmonary:      Effort: Pulmonary effort is normal. No respiratory distress. Breath sounds: Normal breath sounds. No stridor. Abdominal:      General: There is no distension. Palpations: Abdomen is soft. Tenderness: There is no abdominal tenderness. Musculoskeletal: Normal range of motion. Skin:     General: Skin is warm and dry. Neurological:      Mental Status: He is alert and oriented to person, place, and time.        Diagnostic Study Results   Labs -     Recent Results (from the past 12 hour(s))   GLUCOSE, POC    Collection Time: 02/17/20  6:08 AM   Result Value Ref Range    Glucose (POC) 379 (H) 65 - 100 mg/dL    Performed by Erich Gonsales    CBC WITH AUTOMATED DIFF    Collection Time: 02/17/20  6:37 AM   Result Value Ref Range    WBC 8.6 4.1 - 11.1 K/uL    RBC 5.62 4.10 - 5.70 M/uL    HGB 15.2 12.1 - 17.0 g/dL    HCT 44.1 36.6 - 50.3 %    MCV 78.5 (L) 80.0 - 99.0 FL    MCH 27.0 26.0 - 34.0 PG    MCHC 34.5 30.0 - 36.5 g/dL    RDW 13.6 11.5 - 14.5 %    PLATELET 544 901 - 203 K/uL    MPV 11.0 8.9 - 12.9 FL    NRBC 0.0 0  WBC    ABSOLUTE NRBC 0.00 0.00 - 0.01 K/uL    NEUTROPHILS 53 32 - 75 %    LYMPHOCYTES 37 12 - 49 %    MONOCYTES 9 5 - 13 %    EOSINOPHILS 1 0 - 7 %    BASOPHILS 0 0 - 1 %    IMMATURE GRANULOCYTES 0 0.0 - 0.5 %    ABS. NEUTROPHILS 4.5 1.8 - 8.0 K/UL    ABS. LYMPHOCYTES 3.2 0.8 - 3.5 K/UL    ABS. MONOCYTES 0.8 0.0 - 1.0 K/UL    ABS. EOSINOPHILS 0.1 0.0 - 0.4 K/UL    ABS. BASOPHILS 0.0 0.0 - 0.1 K/UL    ABS. IMM. GRANS. 0.0 0.00 - 0.04 K/UL    DF AUTOMATED     METABOLIC PANEL, COMPREHENSIVE    Collection Time: 02/17/20  6:37 AM   Result Value Ref Range    Sodium 132 (L) 136 - 145 mmol/L    Potassium 3.3 (L) 3.5 - 5.1 mmol/L    Chloride 94 (L) 97 - 108 mmol/L    CO2 28 21 - 32 mmol/L    Anion gap 10 5 - 15 mmol/L    Glucose 366 (H) 65 - 100 mg/dL    BUN 18 6 - 20 MG/DL    Creatinine 1.40 (H) 0.70 - 1.30 MG/DL    BUN/Creatinine ratio 13 12 - 20      GFR est AA >60 >60 ml/min/1.73m2    GFR est non-AA 53 (L) >60 ml/min/1.73m2    Calcium 8.9 8.5 - 10.1 MG/DL    Bilirubin, total 0.7 0.2 - 1.0 MG/DL    ALT (SGPT) 48 12 - 78 U/L    AST (SGOT) 25 15 - 37 U/L    Alk.  phosphatase 105 45 - 117 U/L    Protein, total 8.3 (H) 6.4 - 8.2 g/dL    Albumin 3.8 3.5 - 5.0 g/dL    Globulin 4.5 (H) 2.0 - 4.0 g/dL    A-G Ratio 0.8 (L) 1.1 - 2.2     URINALYSIS W/ REFLEX CULTURE    Collection Time: 02/17/20  7:32 AM   Result Value Ref Range    Color YELLOW/STRAW      Appearance CLEAR CLEAR      Specific gravity 1.015 1.003 - 1.030      pH (UA) 5.5 5.0 - 8.0      Protein NEGATIVE  NEG mg/dL    Glucose >1,000 (A) NEG mg/dL    Ketone 15 (A) NEG mg/dL    Bilirubin NEGATIVE  NEG      Blood NEGATIVE  NEG      Urobilinogen 0.2 0.2 - 1.0 EU/dL    Nitrites NEGATIVE  NEG      Leukocyte Esterase NEGATIVE  NEG      UA:UC IF INDICATED CULTURE NOT INDICATED BY UA RESULT CNI      WBC 0-4 0 - 4 /hpf    RBC 0-5 0 - 5 /hpf    Epithelial cells FEW FEW /lpf    Bacteria NEGATIVE  NEG /hpf    Hyaline cast 0-2 0 - 5 /lpf   GLUCOSE, POC    Collection Time: 02/17/20  9:40 AM   Result Value Ref Range    Glucose (POC) 330 (H) 65 - 100 mg/dL    Performed by [id number] [id number] (traveler)    GLUCOSE, POC    Collection Time: 02/17/20 11:09 AM   Result Value Ref Range    Glucose (POC) 276 (H) 65 - 100 mg/dL    Performed by [id number] [facility] [id number] (traveler)        Radiologic Studies -   No orders to display     No results found. Medical Decision Making   I am the first provider for this patient. I reviewed the vital signs, available nursing notes, past medical history, past surgical history, family history and social history. Vital Signs-Reviewed the patient's vital signs. Patient Vitals for the past 12 hrs:   Temp Pulse Resp BP SpO2   02/17/20 0857 -- -- -- (!) 121/94 --   02/17/20 0823 -- -- -- -- 95 %   02/17/20 0712 98 °F (36.7 °C) -- -- -- 95 %   02/17/20 0700 -- -- -- 113/85 94 %   02/17/20 0605 98.4 °F (36.9 °C) 96 16 (!) 120/104 96 %       Pulse Oximetry Analysis - 95% on ra      Records Reviewed: Nursing Notes and Old Medical Records    Provider Notes (Medical Decision Making):   Ddx: hyperglycemia, new diabetes  Will r/o DKA with basic labs, UA    BG on arrival in the high 300s    ED Course:   Initial assessment performed. The patients presenting problems have been discussed, and they are in agreement with the care plan formulated and outlined with them. I have encouraged them to ask questions as they arise throughout their visit. BG improved  Will start on metformin  Advised close outpatient follow up with pcp    Critical Care:  none    Disposition:  Discharge Note:  11:18 AM  The patient has been re-evaluated and is ready for discharge. Reviewed available results with patient. Counseled patient on diagnosis and care plan. Patient has expressed understanding, and all questions have been answered.  Patient agrees with plan and agrees to follow up as recommended, or to return to the ED if their symptoms worsen. Discharge instructions have been provided and explained to the patient, along with reasons to return to the ED. PLAN:  1. Discharge Medication List as of 2/17/2020 11:18 AM      START taking these medications    Details   metFORMIN (GLUCOPHAGE) 500 mg tablet Take 1 Tab by mouth two (2) times daily (with meals). , Normal, Disp-60 Tab, R-0         CONTINUE these medications which have NOT CHANGED    Details   !! OTHER C-PAP MACHINE WHILE SLEEPING, Historical Med      methylPREDNISolone (MEDROL DOSEPACK) 4 mg tablet As directed for 6 days one package, Print, Disp-1 Dose Pack, R-0      ENBREL SURECLICK 50 mg/mL (1 mL) injection 0.98 mL by SubCUTAneous route every seven (7) days for 30 days. , Sample, Disp-6 Each, R-0, JOSEP      diclofenac EC (VOLTAREN) 75 mg EC tablet Take 1 Tab by mouth two (2) times a day., Normal, Disp-60 Tab, R-1      !! chlorthalidone (HYGROTEN) 25 mg tablet TAKE 1 TABLET BY MOUTH EVERY DAY, Normal, Disp-90 Tab, R-0      hydroxychloroquine (PLAQUENIL) 200 mg tablet Take 2 Tabs by mouth daily. , Normal, Disp-60 Tab, R-6      turmeric 400 mg cap Take  by mouth daily. , Historical Med      fish oil-omega-3 fatty acids (FISH OIL) 340-1,000 mg capsule Take 1 Cap by mouth daily. , Historical Med      cholecalciferol (VITAMIN D3) 1,000 unit tablet Take  by mouth daily. , Historical Med      cyanocobalamin (VITAMIN B-12) 500 mcg tablet Take 500 mcg by mouth daily. , Historical Med      !! OTHER Embriel injection one per week, Historical Med      !! chlorthalidone (HYGROTEN) 25 mg tablet TAKE 1 TABLET BY MOUTH EVERY DAY, Historical Med, R-0      folic acid (FOLVITE) 1 mg tablet TAKE 1 TABLET BY MOUTH DAILY, Normal, Disp-1 Tab, R-3      methotrexate (RHEUMATREX) 2.5 mg tablet Take  by mouth every Sunday.  Takes 4 Tablets Every Sunday, Historical Med      tacrolimus (PROTOPIC) 0.1 % ointment Apply  to affected area two (2) times a day., Normal, Disp-30 g, R-6      aspirin delayed-release 81 mg tablet Take 1 Tab by mouth daily. , No Print, Disp-30 Tab, R-11       !! - Potential duplicate medications found. Please discuss with provider. 2.   Follow-up Information     Follow up With Specialties Details Why Contact Info    Koki Toth MD Family Practice Schedule an appointment as soon as possible for a visit  [address] [place] [address]       [facility] EMERGENCY DEPT Emergency Medicine  As needed, If symptoms worsen [address]  [address] [facility]  [phone]        Return to ED if worse     Diagnosis     Clinical Impression:   1. Type 2 diabetes mellitus without complication, without long-term current use of insulin ([facility] [address]    2. Hyperglycemia        This note will not be viewable in [employer]. Please note that this dictation was completed with [employer], the computer voice recognition software. Quite often unanticipated grammatical, syntax, homophones, and other interpretive errors are inadvertently transcribed by the computer software. Please disregard these errors.   Please excuse any errors that have escaped final proofreading

## 2020-02-17 NOTE — ED NOTES
[facility]. Advised of patient's chief complaint, orders that are completed, any outstanding orders that still need to be completed, and the current treatment plan. All questions addressed prior to assumption of patient care at this time.

## 2020-02-17 NOTE — ED TRIAGE NOTES
Assumed care of patient. Patient reports polydipsia and urinary frequency/urgency x2 weeks. Reports started a diet change x1 month ago, cutting out soda products and drinking lemon water. Reports waking up this morning feeling lethargic, checking fsbs with family member machine and fsbs 352. Denies history of diabetes. AxOx4. Warm and pink. GCS 15. Denies pain. +polydipsia.

## 2020-02-17 NOTE — LETTER
[address] 
hospitals EMERGENCY DEPT 
[address] [facility] Radha Alejo [id number] [phone] Work/School Note Date: 2/17/2020 To Whom It May concern: 
 
Krishna Sinha was seen and treated today in the emergency room by the following provider(s): 
No providers found. Krishna Sinha may return to work on 2/19.  
 
Sincerely, 
 
 
 
 
Deanna Sanchez MD

## 2020-02-17 NOTE — ED NOTES
Per MD Marie, we will medicate with insulin, wait 45 minutes and then recheck BG    Pt updated on POC, all questions and concerns addressed at this time

## 2020-02-17 NOTE — ED NOTES
Assumed care of pt from [address] [place] [address]. At bedside, VS updated and stable, pt denies pain at this time, updated on POC and provided a refill cup of ice per request. No questions or concerns at this time.

## 2020-02-20 ENCOUNTER — OFFICE VISIT (OUTPATIENT)
Dept: FAMILY MEDICINE CLINIC | Age: 55
End: 2020-02-20

## 2020-02-20 VITALS
RESPIRATION RATE: 20 BRPM | HEART RATE: 83 BPM | SYSTOLIC BLOOD PRESSURE: 113 MMHG | WEIGHT: 274.4 LBS | BODY MASS INDEX: 43.07 KG/M2 | OXYGEN SATURATION: 95 % | TEMPERATURE: 98.2 F | HEIGHT: 67 IN | DIASTOLIC BLOOD PRESSURE: 78 MMHG

## 2020-02-20 DIAGNOSIS — E11.65 TYPE 2 DIABETES MELLITUS WITH HYPERGLYCEMIA, WITH LONG-TERM CURRENT USE OF INSULIN (HCC): Primary | ICD-10-CM

## 2020-02-20 DIAGNOSIS — Z79.4 TYPE 2 DIABETES MELLITUS WITH HYPERGLYCEMIA, WITH LONG-TERM CURRENT USE OF INSULIN (HCC): Primary | ICD-10-CM

## 2020-02-20 RX ORDER — METFORMIN HYDROCHLORIDE 1000 MG/1
1000 TABLET ORAL 2 TIMES DAILY WITH MEALS
Qty: 60 TAB | Refills: 1 | Status: SHIPPED | OUTPATIENT
Start: 2020-02-20 | End: 2020-07-08

## 2020-02-20 NOTE — LETTER
NOTIFICATION RETURN TO WORK / SCHOOL 
 
 
 
 
 
2/20/2020 4:13 PM 
 
Mr. Socrates Ortega [address] [employer] [address] [id number] To Whom It May Concern: 
 
Socrates Ortega is currently under the care of [address] [facility]. He was just diagnosed with type II diabetic state, and has been just started on diabetic medications. If there are questions or concerns please have the patient contact our office.  
 
 
 
Sincerely, 
 
 
Macy Hanks MD

## 2020-02-20 NOTE — PROGRESS NOTES
Name and  verified        Chief Complaint   Patient presents with    Follow-up     ED Visit on 2020 DX Type 2 DM/Hyperglycemia         Health Maintenance reviewed-discussed with patient. 1. Have you been to the ER, urgent care clinic since your last visit? Hospitalized since your last visit? Yes, see above note. 2. Have you seen or consulted any other health care providers outside of the [address] [place] [address] since your last visit? Include any pap smears or colon screening.   no

## 2020-02-20 NOTE — PROGRESS NOTES
HISTORY OF PRESENT ILLNESS  Betty Garrido is a 48 y.o. male. HPI    Newly diagnosed DMtype II  Patient also present for diabetic check,  the patient has been compliancy w/ meds, patient also states that the pt is trying to have a diabetic diet, and eat less of carbohydrates, since last visit patient has been more active with daily walking, patient also states that there is a home monitoring glucose device  usually averaging around 260 , +++ Rf needed for today. This time patient denies  tingling sensation, has no polyurea and polydipsia, last a1c was at target of 6.4% %     Last urine microalbumin 2019 and was abnormal, patient currently on ACE inhibitor. Feeling better since the last visit. Glucose (POC) 379   Sodium 132 136 - 145 mmol/L Final Potassium 3.3     Glucose >1,000 NEG mg/dL Final Ketone 15   Glucose (POC) 330   Unable to effort the costs of labs not willing to get a1c check,         Current Outpatient Medications   Medication Sig Dispense Refill    metFORMIN (GLUCOPHAGE) 500 mg tablet Take 1 Tab by mouth two (2) times daily (with meals). 60 Tab 0    ENBREL SURECLICK 50 mg/mL (1 mL) injection 0.98 mL by SubCUTAneous route every seven (7) days for 30 days. 6 Each 0    diclofenac EC (VOLTAREN) 75 mg EC tablet Take 1 Tab by mouth two (2) times a day. 60 Tab 1    chlorthalidone (HYGROTEN) 25 mg tablet TAKE 1 TABLET BY MOUTH EVERY DAY 90 Tab 0    chlorthalidone (HYGROTEN) 25 mg tablet TAKE 1 TABLET BY MOUTH EVERY DAY  0    OTHER C-PAP MACHINE WHILE SLEEPING      methylPREDNISolone (MEDROL DOSEPACK) 4 mg tablet As directed for 6 days one package 1 Dose Pack 0    hydroxychloroquine (PLAQUENIL) 200 mg tablet Take 2 Tabs by mouth daily. 60 Tab 6    turmeric 400 mg cap Take  by mouth daily.  fish oil-omega-3 fatty acids (FISH OIL) 340-1,000 mg capsule Take 1 Cap by mouth daily.  cholecalciferol (VITAMIN D3) 1,000 unit tablet Take  by mouth daily.       cyanocobalamin (VITAMIN B-12) 500 mcg tablet Take 500 mcg by mouth daily.  OTHER Embriel injection one per week      folic acid (FOLVITE) 1 mg tablet TAKE 1 TABLET BY MOUTH DAILY 1 Tab 3    methotrexate (RHEUMATREX) 2.5 mg tablet Take  by mouth every Sunday. Takes 4 Tablets Every Sunday      tacrolimus (PROTOPIC) 0.1 % ointment Apply  to affected area two (2) times a day. 30 g 6    aspirin delayed-release 81 mg tablet Take 1 Tab by mouth daily. 30 Tab 11     No Known Allergies  Past Medical History:   Diagnosis Date    Cellulitis and abscess of trunk 7/31/2014    Chronic back pain greater than 3 months duration 7/31/2014    Family history of prostate cancer 7/31/2014    Hyperhydrosis disorder 7/31/2014    Hypertension     Intention tremor 12/9/2015    Prediabetes 6/25/2014    RA (rheumatoid arthritis) ([facility] [address] 7/31/2014    Rash of perineum 6/25/2014    Tinea manus 1/19/2016    Tinea pedis of both feet 1/19/2016    Tobacco abuse 6/25/2014     Past Surgical History:   Procedure Laterality Date    HX ORTHOPAEDIC      right leg and knee     Family History   Problem Relation Age of Onset    Diabetes Mother     Hypertension Mother     Stroke Mother     Diabetes Father     Hypertension Father      Social History     Tobacco Use    Smoking status: Former Smoker     Packs/day: 1.00    Smokeless tobacco: Never Used   Substance Use Topics    Alcohol use: Yes     Comment: occasionally      Lab Results   Component Value Date/Time    Hemoglobin A1c 6.4 (H) 07/17/2019 03:38 PM    Hemoglobin A1c 5.5 01/19/2016 12:26 PM    Hemoglobin A1c 5.7 (H) 06/25/2014 12:19 PM    Glucose 366 (H) 02/17/2020 06:37 AM    Glucose (POC) 276 (H) 02/17/2020 11:09 AM    LDL, calculated 66 07/17/2019 03:38 PM    Creatinine 1.40 (H) 02/17/2020 06:37 AM         Review of Systems   Constitutional: Negative for chills and fever. HENT: Negative for nosebleeds. Eyes: Negative for pain. Respiratory: Negative for cough and wheezing.     Cardiovascular: Negative for chest pain and leg swelling. Gastrointestinal: Negative for constipation, diarrhea and nausea. Genitourinary: Negative for frequency. Musculoskeletal: Negative for joint pain and myalgias. Skin: Negative for rash. Neurological: Negative for loss of consciousness and headaches. Endo/Heme/Allergies: Does not bruise/bleed easily. Psychiatric/Behavioral: Negative for depression. The patient is not nervous/anxious and does not have insomnia. All other systems reviewed and are negative. Physical Exam  Vitals signs and nursing note reviewed. Constitutional:       Appearance: He is well-developed. HENT:      Head: Normocephalic and atraumatic. Mouth/Throat:      Pharynx: No oropharyngeal exudate. Eyes:      Conjunctiva/sclera: Conjunctivae normal.   Neck:      Musculoskeletal: Normal range of motion and neck supple. Cardiovascular:      Rate and Rhythm: Normal rate and regular rhythm. Heart sounds: Normal heart sounds. No murmur. Pulmonary:      Effort: Pulmonary effort is normal. No respiratory distress. Breath sounds: Normal breath sounds. Abdominal:      General: Bowel sounds are normal. There is no distension. Palpations: Abdomen is soft. Tenderness: There is no rebound. Musculoskeletal:         General: No tenderness. Skin:     General: Skin is warm. Findings: No erythema. Neurological:      Mental Status: He is alert and oriented to person, place, and time. Psychiatric:         Behavior: Behavior normal.         ASSESSMENT and PLAN  Diagnoses and all orders for this visit:    1. Type 2 diabetes mellitus with hyperglycemia, with long-term current use of insulin (HCC)  -     metFORMIN (GLUCOPHAGE) 1,000 mg tablet; Take 1 Tab by mouth two (2) times daily (with meals).       Normal test results except for sugar level into the UNcontrolled diabetic state please be compliant with the following steps for improving diabetic care and outcome for further care to prevent further devastating complications of a uncontrolled diabetic state: increase Diabetic Education by more readings, have a great diabetic diet , low cholesterol diet, weight control and daily exercise 20- 30 min most days of the week, home glucose monitoring if possible, and daily foot care and yearly foot  Doctor  and  annual eye examinations at Ophthalmologist,  will cont with your average sugar reading check every 3months.

## 2020-02-20 NOTE — PATIENT INSTRUCTIONS
Learning About Diabetes Food Guidelines  Your Care Instructions    Meal planning is important to manage diabetes. It helps keep your blood sugar at a target level (which you set with your doctor). You don't have to eat special foods. You can eat what your family eats, including sweets once in a while. But you do have to pay attention to how often you eat and how much you eat of certain foods. You may want to work with a dietitian or a certified diabetes educator (CDE) to help you plan meals and snacks. A dietitian or CDE can also help you lose weight if that is one of your goals. What should you know about eating carbs? Managing the amount of carbohydrate (carbs) you eat is an important part of healthy meals when you have diabetes. Carbohydrate is found in many foods. · Learn which foods have carbs. And learn the amounts of carbs in different foods. ? Bread, cereal, pasta, and rice have about 15 grams of carbs in a serving. A serving is 1 slice of bread (1 ounce), ½ cup of cooked cereal, or 1/3 cup of cooked pasta or rice. ? Fruits have 15 grams of carbs in a serving. A serving is 1 small fresh fruit, such as an apple or orange; ½ of a banana; ½ cup of cooked or canned fruit; ½ cup of fruit juice; 1 cup of melon or raspberries; or 2 tablespoons of dried fruit. ? Milk and no-sugar-added yogurt have 15 grams of carbs in a serving. A serving is 1 cup of milk or 2/3 cup of no-sugar-added yogurt. ? Starchy vegetables have 15 grams of carbs in a serving. A serving is ½ cup of mashed potatoes or sweet potato; 1 cup winter squash; ½ of a small baked potato; ½ cup of cooked beans; or ½ cup cooked corn or green peas. · Learn how much carbs to eat each day and at each meal. A dietitian or CDE can teach you how to keep track of the amount of carbs you eat. This is called carbohydrate counting. · If you are not sure how to count carbohydrate grams, use the Plate Method to plan meals.  It is a good, quick way to make sure that you have a balanced meal. It also helps you spread carbs throughout the day. ? Divide your plate by types of foods. Put non-starchy vegetables on half the plate, meat or other protein food on one-quarter of the plate, and a grain or starchy vegetable in the final quarter of the plate. To this you can add a small piece of fruit and 1 cup of milk or yogurt, depending on how many carbs you are supposed to eat at a meal.  · Try to eat about the same amount of carbs at each meal. Do not \"save up\" your daily allowance of carbs to eat at one meal.  · Proteins have very little or no carbs per serving. Examples of proteins are beef, chicken, turkey, fish, eggs, tofu, cheese, cottage cheese, and peanut butter. A serving size of meat is 3 ounces, which is about the size of a deck of cards. Examples of meat substitute serving sizes (equal to 1 ounce of meat) are 1/4 cup of cottage cheese, 1 egg, 1 tablespoon of peanut butter, and ½ cup of tofu. How can you eat out and still eat healthy? · Learn to estimate the serving sizes of foods that have carbohydrate. If you measure food at home, it will be easier to estimate the amount in a serving of restaurant food. · If the meal you order has too much carbohydrate (such as potatoes, corn, or baked beans), ask to have a low-carbohydrate food instead. Ask for a salad or green vegetables. · If you use insulin, check your blood sugar before and after eating out to help you plan how much to eat in the future. · If you eat more carbohydrate at a meal than you had planned, take a walk or do other exercise. This will help lower your blood sugar. What else should you know? · Limit saturated fat, such as the fat from meat and dairy products. This is a healthy choice because people who have diabetes are at higher risk of heart disease. So choose lean cuts of meat and nonfat or low-fat dairy products.  Use olive or canola oil instead of butter or shortening when cooking. · Don't skip meals. Your blood sugar may drop too low if you skip meals and take insulin or certain medicines for diabetes. · Check with your doctor before you drink alcohol. Alcohol can cause your blood sugar to drop too low. Alcohol can also cause a bad reaction if you take certain diabetes medicines. Follow-up care is a key part of your treatment and safety. Be sure to make and go to all appointments, and call your doctor if you are having problems. It's also a good idea to know your test results and keep a list of the medicines you take. Where can you learn more? Go to http://jada-moreno.info/. Enter L720 in the search box to learn more about \"Learning About Diabetes Food Guidelines. \"  Current as of: April 16, 2019  Content Version: 12.2  © 0797-7976 [employer]. Care instructions adapted under license by [employer] (which disclaims liability or warranty for this information). If you have questions about a medical condition or this instruction, always ask your healthcare professional. [address] any warranty or liability for your use of this information. Counting Carbohydrates: Care Instructions  Your Care Instructions    You don't have to eat special foods when you have diabetes. You just have to be careful to eat healthy foods. Carbohydrates (carbs) raise blood sugar higher and quicker than any other nutrient. Carbs are found in desserts, breads and cereals, and fruit. They're also in starchy vegetables. These include potatoes, corn, and grains such as rice and pasta. Carbs are also in milk and yogurt. The more carbs you eat at one time, the higher your blood sugar will rise. Spreading carbs all through the day helps keep your blood sugar levels within your target range. Counting carbs is one of the best ways to keep your blood sugar under control.   If you use insulin, counting carbs helps you match the right amount of insulin to the number of grams of carbs in a meal. Then you can change your diet and insulin dose as needed. Testing your blood sugar several times a day can help you learn how carbs affect your blood sugar. A registered dietitian or certified diabetes educator can help you plan meals and snacks. Follow-up care is a key part of your treatment and safety. Be sure to make and go to all appointments, and call your doctor if you are having problems. It's also a good idea to know your test results and keep a list of the medicines you take. How can you care for yourself at home? Know your daily amount of carbohydrates  Your daily amount depends on several things, such as your weight, how active you are, which diabetes medicines you take, and what your goals are for your blood sugar levels. A registered dietitian or certified diabetes educator can help you plan how many carbs to include in each meal and snack. For most adults, a guideline for the daily amount of carbs is:  · 45 to 60 grams at each meal. That's about the same as 3 to 4 carbohydrate servings. · 15 to 20 grams at each snack. That's about the same as 1 carbohydrate serving. Count carbs  Counting carbs lets you know how much rapid-acting insulin to take before you eat. If you use an insulin pump, you get a constant rate of insulin during the day. So the pump must be programmed at meals. This gives you extra insulin to cover the rise in blood sugar after meals. If you take insulin:  · Learn your own insulin-to-carb ratio. You and your diabetes health professional will figure out the ratio. You can do this by testing your blood sugar after meals. For example, you may need a certain amount of insulin for every 15 grams of carbs. · Add up the carb grams in a meal. Then you can figure out how many units of insulin to take based on your insulin-to-carb ratio. · Exercise lowers blood sugar.  You can use less insulin than you would if you were not doing exercise. Keep in mind that timing matters. If you exercise within 1 hour after a meal, your body may need less insulin for that meal than it would if you exercised 3 hours after the meal. Test your blood sugar to find out how exercise affects your need for insulin. If you do or don't take insulin:  · Look at labels on packaged foods. This can tell you how many carbs are in a serving. You can also use guides from the American Diabetes Association. · Be aware of portions, or serving sizes. If a package has two servings and you eat the whole package, you need to double the number of grams of carbohydrate listed for one serving. · Protein, fat, and fiber do not raise blood sugar as much as carbs do. If you eat a lot of these nutrients in a meal, your blood sugar will rise more slowly than it would otherwise. Eat from all food groups  · Eat at least three meals a day. · Plan meals to include food from all the food groups. The food groups include grains, fruits, dairy, proteins, and vegetables. · Talk to your dietitian or diabetes educator about ways to add limited amounts of sweets into your meal plan. · If you drink alcohol, talk to your doctor. It may not be recommended when you are taking certain diabetes medicines. Where can you learn more? Go to http://jada-moreno.info/. Enter J109 in the search box to learn more about \"Counting Carbohydrates: Care Instructions. \"  Current as of: April 16, 2019  Content Version: 12.2  © 0934-5723 [employer]. Care instructions adapted under license by [employer] (which disclaims liability or warranty for this information). If you have questions about a medical condition or this instruction, always ask your healthcare professional. [place] [address] any warranty or liability for your use of this information.

## 2020-02-22 ENCOUNTER — APPOINTMENT (OUTPATIENT)
Dept: GENERAL RADIOLOGY | Age: 55
End: 2020-02-22
Attending: PHYSICIAN ASSISTANT
Payer: MEDICAID

## 2020-02-22 ENCOUNTER — HOSPITAL ENCOUNTER (EMERGENCY)
Age: 55
Discharge: HOME | End: 2020-02-23
Attending: EMERGENCY MEDICINE
Payer: MEDICAID

## 2020-02-22 VITALS
RESPIRATION RATE: 20 BRPM | OXYGEN SATURATION: 99 % | SYSTOLIC BLOOD PRESSURE: 145 MMHG | HEART RATE: 99 BPM | HEIGHT: 67 IN | WEIGHT: 273.59 LBS | BODY MASS INDEX: 42.94 KG/M2 | DIASTOLIC BLOOD PRESSURE: 87 MMHG

## 2020-02-22 DIAGNOSIS — M19.019 DEGENERATIVE JOINT DISEASE OF ACROMIOCLAVICULAR JOINT: ICD-10-CM

## 2020-02-22 DIAGNOSIS — Z86.39 HISTORY OF DIABETES MELLITUS: ICD-10-CM

## 2020-02-22 DIAGNOSIS — M25.512 ACUTE PAIN OF LEFT SHOULDER: Primary | ICD-10-CM

## 2020-02-22 DIAGNOSIS — Z87.39 HISTORY OF RHEUMATOID ARTHRITIS: ICD-10-CM

## 2020-02-22 PROCEDURE — 74011250636 HC RX REV CODE- 250/636: Performed by: PHYSICIAN ASSISTANT

## 2020-02-22 PROCEDURE — 73030 X-RAY EXAM OF SHOULDER: CPT

## 2020-02-22 PROCEDURE — 99283 EMERGENCY DEPT VISIT LOW MDM: CPT

## 2020-02-22 PROCEDURE — 96372 THER/PROPH/DIAG INJ SC/IM: CPT

## 2020-02-22 RX ORDER — PREDNISONE 20 MG/1
60 TABLET ORAL ONCE
Status: COMPLETED | OUTPATIENT
Start: 2020-02-22 | End: 2020-02-23

## 2020-02-22 RX ORDER — DEXTROMETHORPHAN HYDROBROMIDE, GUAIFENESIN 5; 100 MG/5ML; MG/5ML
650 LIQUID ORAL EVERY 8 HOURS
Qty: 21 TAB | Refills: 0 | Status: SHIPPED | OUTPATIENT
Start: 2020-02-22 | End: 2020-11-17 | Stop reason: ALTCHOICE

## 2020-02-22 RX ORDER — KETOROLAC TROMETHAMINE 30 MG/ML
30 INJECTION, SOLUTION INTRAMUSCULAR; INTRAVENOUS
Status: COMPLETED | OUTPATIENT
Start: 2020-02-22 | End: 2020-02-22

## 2020-02-22 RX ORDER — PREDNISONE 20 MG/1
20 TABLET ORAL 2 TIMES DAILY
Qty: 10 TAB | Refills: 0 | Status: SHIPPED | OUTPATIENT
Start: 2020-02-22 | End: 2020-02-27

## 2020-02-22 RX ADMIN — KETOROLAC TROMETHAMINE 30 MG: 30 INJECTION, SOLUTION INTRAMUSCULAR at 23:11

## 2020-02-22 NOTE — LETTER
[address] 
[facility] EMERGENCY DEPT 
[address] [place], [place] [address]jayde Unger [id number] [phone] Work/School Note Date: 2/22/2020 To Whom It May concern: 
 
Burgess Melvin was seen and treated today in the emergency room by the following provider(s): 
Attending Provider: Venkata Hester MD 
Physician Assistant: Jayden Ashton PA-C. Burgess Melvin may return to work on 2/25/20. Sincerely, Joshua Miller PA-C

## 2020-02-23 PROCEDURE — 74011636637 HC RX REV CODE- 636/637: Performed by: PHYSICIAN ASSISTANT

## 2020-02-23 RX ADMIN — PREDNISONE 60 MG: 20 TABLET ORAL at 00:00

## 2020-02-23 NOTE — ED TRIAGE NOTES
Pt arrived from triage. Pt stating he has left arm/shoulder pain x1 week. Pt denies heavy lifting, CP, SOB. Pt states pain is worse or better with movement and position. LISETTE King at bedside.

## 2020-02-23 NOTE — DISCHARGE INSTRUCTIONS
Patient Education        Shoulder Pain: Care Instructions  Your Care Instructions    You can hurt your shoulder by using it too much during an activity, such as fishing or baseball. It can also happen as part of the everyday wear and tear of getting older. Shoulder injuries can be slow to heal, but your shoulder should get better with time. Your doctor may recommend a sling to rest your shoulder. If you have injured your shoulder, you may need testing and treatment. Follow-up care is a key part of your treatment and safety. Be sure to make and go to all appointments, and call your doctor if you are having problems. It's also a good idea to know your test results and keep a list of the medicines you take. How can you care for yourself at home? · Take pain medicines exactly as directed. ? If the doctor gave you a prescription medicine for pain, take it as prescribed. ? If you are not taking a prescription pain medicine, ask your doctor if you can take an over-the-counter medicine. ? Do not take two or more pain medicines at the same time unless the doctor told you to. Many pain medicines contain acetaminophen, which is Tylenol. Too much acetaminophen (Tylenol) can be harmful. · If your doctor recommends that you wear a sling, use it as directed. Do not take it off before your doctor tells you to. · Put ice or a cold pack on the sore area for 10 to 20 minutes at a time. Put a thin cloth between the ice and your skin. · If there is no swelling, you can put moist heat, a heating pad, or a warm cloth on your shoulder. Some doctors suggest alternating between hot and cold. · Rest your shoulder for a few days. If your doctor recommends it, you can then begin gentle exercise of the shoulder, but do not lift anything heavy. When should you call for help? Call 911 anytime you think you may need emergency care. For example, call if:    · You have chest pain or pressure.  This may occur with:  ? Sweating. ? Shortness of breath. ? Nausea or vomiting. ? Pain that spreads from the chest to the neck, jaw, or one or both shoulders or arms. ? Dizziness or lightheadedness. ? A fast or uneven pulse. After calling 911, chew 1 adult-strength aspirin. Wait for an ambulance. Do not try to drive yourself.     · Your arm or hand is cool or pale or changes color.    Call your doctor now or seek immediate medical care if:    · You have signs of infection, such as:  ? Increased pain, swelling, warmth, or redness in your shoulder. ? Red streaks leading from a place on your shoulder. ? Pus draining from an area of your shoulder. ? Swollen lymph nodes in your neck, armpits, or groin. ? A fever.    Watch closely for changes in your health, and be sure to contact your doctor if:    · You cannot use your shoulder.     · Your shoulder does not get better as expected. Where can you learn more? Go to http://jada-moreno.info/. Enter J586 in the search box to learn more about \"Shoulder Pain: Care Instructions. \"  Current as of: June 26, 2019  Content Version: 12.2  © 4400-2519 [employer]. Care instructions adapted under license by [employer] (which disclaims liability or warranty for this information). If you have questions about a medical condition or this instruction, always ask your healthcare professional. [place] [address] any warranty or liability for your use of this information. Patient Education        Shoulder Arthritis: Exercises  Introduction  Here are some examples of exercises for you to try. The exercises may be suggested for a condition or for rehabilitation. Start each exercise slowly. Ease off the exercises if you start to have pain. You will be told when to start these exercises and which ones will work best for you. How to do the exercises  Shoulder flexion (lying down)    1.  Lie on your back, holding a wand with both hands. Your palms should face down as you hold the wand. 2. Keeping your elbows straight, slowly raise your arms over your head. Raise them until you feel a stretch in your shoulders, upper back, and chest.  3. Hold for 15 to 30 seconds. 4. Repeat 2 to 4 times. Shoulder rotation (lying down)    1. Lie on your back. Hold a wand with both hands with your elbows bent and palms up. 2. Keep your elbows close to your body, and move the wand across your body toward the sore arm. 3. Hold for 8 to 12 seconds. 4. Repeat 2 to 4 times. Shoulder internal rotation with towel    1. Hold a towel above and behind your head with the arm that is not sore. 2. With your sore arm, reach behind your back and grasp the towel. 3. With the arm above your head, pull the towel upward. Do this until you feel a stretch on the front and outside of your sore shoulder. 4. Hold 15 to 30 seconds. 5. Repeat 2 to 4 times. Shoulder blade squeeze    1. Stand with your arms at your sides, and squeeze your shoulder blades together. Do not raise your shoulders up as you squeeze. 2. Hold 6 seconds. 3. Repeat 8 to 12 times. Resisted rows    1. Put the band around a solid object at about waist level. (A bedpost will work well.) Each hand should hold an end of the band. 2. With your elbows at your sides and bent to 90 degrees, pull the band back. Your shoulder blades should move toward each other. Return to the starting position. 3. Repeat 8 to 12 times. External rotator strengthening exercise    1. Start by tying a piece of elastic exercise material to a doorknob. You can use surgical tubing or Thera-Band. (You may also hold one end of the band in each hand.)  2. Stand or sit with your shoulder relaxed and your elbow bent 90 degrees. Your upper arm should rest comfortably against your side. Squeeze a rolled towel between your elbow and your body for comfort. This will help keep your arm at your side.   3. Hold one end of the elastic band with the hand of the painful arm. 4. Start with your forearm across your belly. Slowly rotate the forearm out away from your body. Keep your elbow and upper arm tucked against the towel roll or the side of your body until you begin to feel tightness in your shoulder. Slowly move your arm back to where you started. 5. Repeat 8 to 12 times. Internal rotator strengthening exercise    1. Start by tying a piece of elastic exercise material to a doorknob. You can use surgical tubing or Thera-Band. 2. Stand or sit with your shoulder relaxed and your elbow bent 90 degrees. Your upper arm should rest comfortably against your side. Squeeze a rolled towel between your elbow and your body for comfort. This will help keep your arm at your side. 3. Hold one end of the elastic band in the hand of the painful arm. 4. Slowly rotate your forearm toward your body until it touches your belly. Slowly move it back to where you started. 5. Keep your elbow and upper arm firmly tucked against the towel roll or at your side. 6. Repeat 8 to 12 times. Pendulum swing    1. Hold on to a table or the back of a chair with your good arm. Then bend forward a little and let your sore arm hang straight down. This exercise does not use the arm muscles. Rather, use your legs and your hips to create movement that makes your arm swing freely. 2. Use the movement from your hips and legs to guide the slightly swinging arm back and forth like a pendulum (or elephant trunk). Then guide it in circles that start small (about the size of a dinner plate). Make the circles a bit larger each day, as your pain allows. 3. Do this exercise for 5 minutes, 5 to 7 times each day. 4. As you have less pain, try bending over a little farther to do this exercise. This will increase the amount of movement at your shoulder. Follow-up care is a key part of your treatment and safety.  Be sure to make and go to all appointments, and call your doctor if you are having problems. It's also a good idea to know your test results and keep a list of the medicines you take. Where can you learn more? Go to http://jada-moreno.info/. Enter H562 in the search box to learn more about \"Shoulder Arthritis: Exercises. \"  Current as of: June 26, 2019  Content Version: 12.2  © 7636-1355 [employer]. Care instructions adapted under license by [employer] (which disclaims liability or warranty for this information). If you have questions about a medical condition or this instruction, always ask your healthcare professional. [place] [address] any warranty or liability for your use of this information.

## 2020-02-23 NOTE — ED PROVIDER NOTES
EMERGENCY DEPARTMENT HISTORY AND PHYSICAL EXAM      Date: 2/22/2020  Patient Name: Susana De Leon    History of Presenting Illness     Chief Complaint   Patient presents with    Arm Pain     pt with c/o left shoulder and arm pain that he stated has been ongoing for about a week. pt denied injury. pt reported hx of severe rheumatoid and osteo- arthritis pt reported that pain is positional and completely relieved depending on his position       History Provided By: Patient    HPI: Susana De Leon, 48 y.o. male with PMHx significant for rheumatoid arthritis, diabetes, knee surgery. Patient presents to the emergency department with greater than 1 week of left shoulder pain. He states his been bothering him at times gets to 10 out of 10 on the pain scale feels like a aching sensation. He states that he works as a  however denies pulling or pushing any heavy objects or unloading any trucks or lifting heavy objects. He denies any acute trauma or falls, chest pain, shortness of breath, tearing back pain, vision changes, nausea, vomiting, upper or lower extremity paresthesias or any other acute complications at this time. He does state that he has a history of rheumatoid arthritis and there has been previous osteophyte formation as he does take methotrexate for his rheumatoid arthritis which he states is on his body exclusively in his upper extremities according to his rheumatologist.  Please note for examination and HPI were completed I did introduce myself as the physician assistant. Please note that this dictation was completed with [email or web address], the computer voice recognition software. Quite often unanticipated grammatical, syntax, homophones, and other interpretive errors are inadvertently transcribed by the computer software. Please disregard these errors. Please excuse any errors that have escaped final proofreading. For further clarification on any chart please contact myself.   Thank you.    There are no other complaints, changes, or physical findings at this time. PCP: Lazarus Never, MD    No current facility-administered medications on file prior to encounter. Current Outpatient Medications on File Prior to Encounter   Medication Sig Dispense Refill    metFORMIN (GLUCOPHAGE) 1,000 mg tablet Take 1 Tab by mouth two (2) times daily (with meals). 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 6 Each 0    diclofenac EC (VOLTAREN) 75 mg EC tablet Take 1 Tab by mouth two (2) times a day. 60 Tab 1    chlorthalidone (HYGROTEN) 25 mg tablet TAKE 1 TABLET BY MOUTH EVERY DAY 90 Tab 0    hydroxychloroquine (PLAQUENIL) 200 mg tablet Take 2 Tabs by mouth daily. 60 Tab 6    turmeric 400 mg cap Take  by mouth daily.  fish oil-omega-3 fatty acids (FISH OIL) 340-1,000 mg capsule Take 1 Cap by mouth daily.  cholecalciferol (VITAMIN D3) 1,000 unit tablet Take  by mouth daily.  cyanocobalamin (VITAMIN B-12) 500 mcg tablet Take 500 mcg by mouth daily.  OTHER Embriel injection one per week      chlorthalidone (HYGROTEN) 25 mg tablet TAKE 1 TABLET BY MOUTH EVERY DAY  0    folic acid (FOLVITE) 1 mg tablet TAKE 1 TABLET BY MOUTH DAILY 1 Tab 3    methotrexate (RHEUMATREX) 2.5 mg tablet Take  by mouth every Sunday. Takes 4 Tablets Every Sunday      tacrolimus (PROTOPIC) 0.1 % ointment Apply  to affected area two (2) times a day. 30 g 6    aspirin delayed-release 81 mg tablet Take 1 Tab by mouth daily.  30 Tab 11       Past History     Past Medical History:  Past Medical History:   Diagnosis Date    Cellulitis and abscess of trunk 7/31/2014    Chronic back pain greater than 3 months duration 7/31/2014    Diabetes ([address]     Family history of prostate cancer 7/31/2014  Hyperhydrosis disorder 7/31/2014    Hypertension     Intention tremor 12/9/2015    Prediabetes 6/25/2014    RA (rheumatoid arthritis) ([facility] [address] 7/31/2014    Rash of perineum 6/25/2014    Tinea manus 1/19/2016    Tinea pedis of both feet 1/19/2016    Tobacco abuse 6/25/2014       Past Surgical History:  Past Surgical History:   Procedure Laterality Date    HX ORTHOPAEDIC      right leg and knee       Family History:  Family History   Problem Relation Age of Onset    Diabetes Mother     Hypertension Mother    [facility] [address] Stroke Mother     Diabetes Father     Hypertension Father        Social History:  Social History     Tobacco Use    Smoking status: Former Smoker     Packs/day: 1.00    Smokeless tobacco: Never Used   Substance Use Topics    Alcohol use: Yes     Comment: occasionally    Drug use: No       Allergies:  No Known Allergies      Review of Systems   Review of Systems   Musculoskeletal:        Left shoulder pain     All other systems reviewed and are negative. Physical Exam   Physical Exam  Vitals signs and nursing note reviewed. Constitutional:       Appearance: He is well-developed. HENT:      Head: Normocephalic and atraumatic. Eyes:      Conjunctiva/sclera: Conjunctivae normal.      Pupils: Pupils are equal, round, and reactive to light. Neck:      Musculoskeletal: Normal range of motion and neck supple. Thyroid: No thyromegaly. Cardiovascular:      Rate and Rhythm: Normal rate and regular rhythm. Heart sounds: Normal heart sounds. No murmur. Pulmonary:      Effort: Pulmonary effort is normal. No respiratory distress. Breath sounds: Normal breath sounds. No stridor. No wheezing. Musculoskeletal: Normal range of motion. General: Tenderness present. No signs of injury. Comments: Tenderness palpation over the left AC joint area.   Patient did have positive speeds test, Neer's Fagan test, liftoff test.  At times there is slight crepitus appreciated with range of motion test.  There is no scapular winging or deformities that are appreciated. He is able to shrug his shoulders without any tenderness in the trapezial or shoulder blade areas. No clavicular step-offs or deformities or tenderness appreciated.  strength 5/5 equal bilaterally. Ulnar radial median motor and sensory intact. +2 brachial radial reflex equal bilaterally. Refill on all fingers less than 2 seconds. Palpable ulnar radial pulse equal bilaterally. Lymphadenopathy:      Cervical: No cervical adenopathy. Skin:     General: Skin is warm. Neurological:      Mental Status: He is alert and oriented to person, place, and time. Deep Tendon Reflexes: Reflexes are normal and symmetric. Psychiatric:         Judgment: Judgment normal.         Diagnostic Study Results     Labs -   No results found for this or any previous visit (from the past 12 hour(s)). Radiologic Studies -   XR SHOULDER LT AP/LAT MIN 2 V   Final Result   IMPRESSION: No acute abnormality. Moderate glenohumeral joint degenerative   disease. CT Results  (Last 48 hours)    None        CXR Results  (Last 48 hours)    None            Medical Decision Making   I am the first provider for this patient. I reviewed the vital signs, available nursing notes, past medical history, past surgical history, family history and social history. Vital Signs-Reviewed the patient's vital signs. Patient Vitals for the past 12 hrs:   Pulse Resp BP SpO2   02/22/20 2242 99 20 145/87 99 %       Records Reviewed: Nursing Notes    Provider Notes (Medical Decision Making): This patient presents to the ED with left shoulder pain. There is no evidence for an acute fracture on the patient's shoulder x-ray. Also, in a detailed history with the patient, and discussing the mechanism, there is not enough evidence to suggest a Shoulder dislocation occurred, so I do not feel that we need to pursue the diagnostic pathway for this.      The patient also has no evidence for significant infectious process, arterial/vascular or nervous injury. Internal derangement, occult fracture, or ligamentous/tendon injury cannot be excluded at this time. I did have an in-depth discussion with the patient regarding the sensitivity of x-rays. I explained that only bones can be seen on x-ray and that cartilage, ligaments and tendons cannot be evaluated using x-rays. I did emphasize that just because nothing was found an x-ray tonight does not mean that there is not an occult bony fracture. They understand that an occult fracture may cause significant morbidity and it is vital that they follow-up with orthopedic specialist and obtain further imaging if necessary. Shoulder sling was offered to patient and he did feel mild relief with IM Toradol injection given at today's visit. ED Course:   Initial assessment performed. The patients presenting problems have been discussed, and they are in agreement with the care plan formulated and outlined with them. I have encouraged them to ask questions as they arise throughout their visit. Critical Care Time: None    Disposition:  Dispo    PLAN:  1. Current Discharge Medication List      START taking these medications    Details   acetaminophen (TYLENOL) 650 mg TbER Take 1 Tab by mouth every eight (8) hours. Qty: 21 Tab, Refills: 0      predniSONE (DELTASONE) 20 mg tablet Take 20 mg by mouth two (2) times a day for 5 days. With Breakfast  Qty: 10 Tab, Refills: 0         CONTINUE these medications which have NOT CHANGED    Details   metFORMIN (GLUCOPHAGE) 1,000 mg tablet Take 1 Tab by mouth two (2) times daily (with meals).   Qty: 60 Tab, Refills: 1    Associated Diagnoses: Type 2 diabetes mellitus with hyperglycemia, with long-term current use of insulin (HCC)      !! 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 Qty: 6 Each, Refills: 0      diclofenac EC (VOLTAREN) 75 mg EC tablet Take 1 Tab by mouth two (2) times a day. Qty: 60 Tab, Refills: 1      !! chlorthalidone (HYGROTEN) 25 mg tablet TAKE 1 TABLET BY MOUTH EVERY DAY  Qty: 90 Tab, Refills: 0    Associated Diagnoses: Prediabetes; HTN, goal below 130/80; Tobacco abuse; Intention tremor      hydroxychloroquine (PLAQUENIL) 200 mg tablet Take 2 Tabs by mouth daily. Qty: 60 Tab, Refills: 6      turmeric 400 mg cap Take  by mouth daily. fish oil-omega-3 fatty acids (FISH OIL) 340-1,000 mg capsule Take 1 Cap by mouth daily. cholecalciferol (VITAMIN D3) 1,000 unit tablet Take  by mouth daily. cyanocobalamin (VITAMIN B-12) 500 mcg tablet Take 500 mcg by mouth daily. !! OTHER Embriel injection one per week      !! chlorthalidone (HYGROTEN) 25 mg tablet TAKE 1 TABLET BY MOUTH EVERY DAY  Refills: 0    Associated Diagnoses: HTN, goal below 466/20      folic acid (FOLVITE) 1 mg tablet TAKE 1 TABLET BY MOUTH DAILY  Qty: 1 Tab, Refills: 3      methotrexate (RHEUMATREX) 2.5 mg tablet Take  by mouth every Sunday. Takes 4 Tablets Every Sunday      tacrolimus (PROTOPIC) 0.1 % ointment Apply  to affected area two (2) times a day. Qty: 30 g, Refills: 6      aspirin delayed-release 81 mg tablet Take 1 Tab by mouth daily. Qty: 30 Tab, Refills: 11    Associated Diagnoses: Tobacco abuse; Prediabetes; HTN, goal below 130/80; Intention tremor       !! - Potential duplicate medications found. Please discuss with provider.         2.   Follow-up Information     Follow up With Specialties Details Why Contact Info    Boogie Contreras, [address]   [address],[place] [address]  [phone] [facility] Department Of Orthopaedic    [address] [place] [address]   [phone]        Return to ED if worse     Diagnosis     Clinical Impression:   1. Acute pain of left shoulder    2. Degenerative joint disease of acromioclavicular joint    3. History of rheumatoid arthritis    4. History of diabetes mellitus          Please note that this dictation was completed with [employer], the computer voice recognition software. Quite often unanticipated grammatical, syntax, homophones, and other interpretive errors are inadvertently transcribed by the computer software. Please disregards these errors. Please excuse any errors that have escaped final proofreading. This note will not be viewable in [address]

## 2020-03-02 ENCOUNTER — OFFICE VISIT (OUTPATIENT)
Dept: RHEUMATOLOGY | Age: 55
End: 2020-03-02

## 2020-03-02 VITALS
TEMPERATURE: 98.1 F | OXYGEN SATURATION: 96 % | RESPIRATION RATE: 16 BRPM | BODY MASS INDEX: 42.88 KG/M2 | WEIGHT: 273.8 LBS | SYSTOLIC BLOOD PRESSURE: 117 MMHG | HEART RATE: 90 BPM | DIASTOLIC BLOOD PRESSURE: 73 MMHG

## 2020-03-02 DIAGNOSIS — M19.011 OSTEOARTHRITIS, LOCALIZED, SHOULDER, RIGHT: ICD-10-CM

## 2020-03-02 DIAGNOSIS — M12.812 ROTATOR CUFF ARTHROPATHY, LEFT: ICD-10-CM

## 2020-03-02 DIAGNOSIS — M05.742 RHEUMATOID ARTHRITIS INVOLVING BOTH HANDS WITH POSITIVE RHEUMATOID FACTOR (HCC): ICD-10-CM

## 2020-03-02 DIAGNOSIS — M05.741 RHEUMATOID ARTHRITIS INVOLVING BOTH HANDS WITH POSITIVE RHEUMATOID FACTOR (HCC): ICD-10-CM

## 2020-03-02 DIAGNOSIS — M05.79 SEROPOSITIVE RHEUMATOID ARTHRITIS OF MULTIPLE SITES (HCC): Primary | ICD-10-CM

## 2020-03-02 RX ORDER — DICLOFENAC SODIUM 75 MG/1
TABLET, DELAYED RELEASE ORAL
Qty: 60 TAB | Refills: 0 | Status: SHIPPED | OUTPATIENT
Start: 2020-03-02 | End: 2020-04-24

## 2020-03-02 RX ORDER — ETANERCEPT 50 MG/ML
50 SOLUTION SUBCUTANEOUS
Qty: 3.92 ML | Refills: 0 | Status: SHIPPED | COMMUNITY
Start: 2020-03-02 | End: 2020-04-01

## 2020-03-02 NOTE — PROGRESS NOTES
RHEUMATOLOGY PROBLEM LIST AND CHIEF COMPLAINT  1. Rheumatoid Arthritis (2015) - Joint pain/swelling, synovitis in hands, wrists, elbows, rheumatoid nodules on elbows, morning stiffness. Elevated ESR, CCP, RF, CRP. Therapy History:  Previous DMARDs: MTX (Past-09/2016, 10/2016 - 12/2017; AEs- nodulosis)   Current DMARDs: Enbrel (11/2015-current), Plaquenil (2015 - 2017, 3/2018-current)    2. Shoulder OA and rotator cuff disease    INTERVAL HISTORY  This is a 48 y.o.  male. Today, the patient complains of pain in the joints. Location: shoulder  Severity:  8 on a scale of 0-10  Timing: all day  Duration:  4 months  Context/Associated signs and symptoms: The patient reports he has started a new job, his insurance will be starting later in March. The patient continues on Enbrel 50 mg weekly and Plaquenil 400 mg daily. He states the nodules remain stable. The patient's chief complaint today is pain in the left shoulder. He reports a recent x-ray revealed a bone spur. He has not followed up with ortho due to loss of insurance. He request a steroid injection to the left shoulder. The patient mentions he was recently diagnosed with diabetes, his blood glucose was over 1000. He has recently been started on Metformin.      RHEUMATOLOGY REVIEW OF SYSTEMS   Positives as per history  Negatives as follows:  Jefe Diamond:  Denies unexplained persistent fevers, weight change, chronic fatigue  HEAD/EYES:   Denies eye redness, blurry vision or sudden loss of vision, dry eyes, HA, temporal artery pain  ENT:    Denies oral/nasal ulcers, recurrent sinus infections, dry mouth  RESPIRATORY:  No pleuritic pain, history of pleural effusions, hemoptysis, exertional dyspnea  CARDIOVASCULAR:  Denies chest pain, history of pericardial effusions  GASTRO:   Denies heartburn, esophageal dysmotility, abdominal pain, nausea, vomiting, diarrhea, blood in the stool  HEMATOLOGIC:  No easy bruising, purpura, swollen lymph nodes  SKIN:    Denies alopecia, ulcers, nodules, sun sensitivity, unexplained persistent rash   VASCULAR:   Denies edema, cyanosis, raynaud phenomenon  NEUROLOGIC:  Denies specific muscle weakness, paresthesias   PSYCHIATRIC:  No sleep disturbance / snoring, depression, anxiety  MSK:    No morning stiffness >1 hour, SI joint pain, persistent joint swelling    PAST MEDICAL HISTORY  Reviewed with patient, significant changes in medical history - no    PHYSICAL EXAM  Blood pressure 117/73, pulse 90, temperature 98.1 °F (36.7 °C), temperature source Oral, resp. rate 16, weight 273 lb 12.8 oz (124.2 kg), SpO2 96 %. GENERAL APPEARANCE: Well-nourished, no acute distress  NECK: No adenopathy  ENT: No oral ulcers  CARDIOVASCULAR: Heart rhythm is regular. No murmur, rub, gallop  CHEST: Normal vesicular breath sounds. No wheezes, rales, pleural friction rubs  ABDOMINAL: The abdomen is soft and nontender. Bowel sounds are normal  SKIN: No rash, palpable purpura, digital ulcer, abnormal thickening   MUSCULOSKELETAL: Rotator cuff signs on the left - unchanged, Antalgic gait  Upper extremities -  2 Nodules noted over left elbow - Left elbow nodule became larger. No synovitis. Some synovial thickening in the right 3rd digit, MCP & PIP. Decreased ROM of left shoulder. Lower extremities - B/L Knee crepitus, bony prominence (R>L), scar over right knee from previous surgery. LABS, RADIOLOGY AND PROCEDURES   Previous labs reviewed -Yes  Last TB 12/2017    ASSESSMENT  1. Rheumatoid Arthritis (Established problem -  Stable disease) - The patient's exam is overall unremarkable, he continues to have some subtle swelling of the right knee. He should continue with his medications Plaquenil 400 mg daily and Enbrel 50 mg weekly. I will give the patient a samples of Enbrel today while he does not have insurance. We will plan to get labs at next visit once his insurance starts. He should return in 4 months.     2. Drug therapy monitoring for toxicity (methotrexate/Enbrel) - CBC, BUN, Cr, AST, ALT and albumin every 4 months  3. Osteoarthritis - His complaints today are from OA. We discussed that his dROM and discomfort of the left shoulder is due to OA. I will give the patient an injection of 40 mg Kenalog which may improve his discomfort. He will likely need to follow up with ortho to discuss further treatment alternatives. He should continue with diclofenac PRN and weight loss. PLAN  1. Enbrel 50 mg weekly; samples given today  2. Plaquenil 400 mg daily  3. Kenalog 40 mg to left shoulder  3. Return in 4 months    Refugio Montano MD  Adult and Pediatric Rheumatology     [facility], [address] [place] [address], Phone [phone], Fax [phone]     Visiting  of Pediatrics    Department of Pediatrics, [facility] of [address] [place] [address], [address] [place] [address], Phone [phone], Fax [phone]    There are no Patient Instructions on file for this visit. cc:  Canelo Echeverria MD    Written by jeffery Martinez, as dictated by Dr. Maite Rashid M.D.    [facility] reviewed for the following:   Danisha Smith MD, have reviewed the History, Physical and updated the Allergic reactions for [address] [place] [address] performed immediately prior to start of procedure:   Danisha Smith MD, have performed the following reviews on [address] [place] [address] prior to the start of the procedure:            * Patient was identified by name and date of birth   * Agreement on procedure being performed was verified  * Risks and Benefits explained to the patient  * Procedure site verified and marked as necessary  * Patient was positioned for comfort  * Consent was signed and verified     Time: 3:20 PM      Date of procedure: 3/2/2020    Procedure performed by:   Maite Rashid MD    Provider assisted by: none    Patient assisted by: self    How tolerated by patient: tolerated the procedure well with no complications    Post Procedural Pain Scale: 0 - No Hurt    Comments: none    PROCEDURE  After consent was obtained, using sterile technique the left shoulder was prepped and Kenalog 40 mg and 1ml of lidocaine was then injected and the needle withdrawn. The procedure was well tolerated. The patient is asked to continue to rest for 24 hours before resuming regular activities. It may be more painful for the first 1-2 days. Watch for fever, or increased swelling or persistent pain. Call or return to clinic prn if such symptoms occur.

## 2020-03-03 RX ORDER — TRIAMCINOLONE ACETONIDE 40 MG/ML
40 INJECTION, SUSPENSION INTRA-ARTICULAR; INTRAMUSCULAR ONCE
Qty: 1 ML | Refills: 0
Start: 2020-03-03 | End: 2020-03-03

## 2020-03-04 ENCOUNTER — TELEPHONE (OUTPATIENT)
Dept: RHEUMATOLOGY | Age: 55
End: 2020-03-04

## 2020-03-04 DIAGNOSIS — I10 HTN, GOAL BELOW 130/80: ICD-10-CM

## 2020-03-04 DIAGNOSIS — R73.03 PREDIABETES: ICD-10-CM

## 2020-03-04 DIAGNOSIS — G25.2 INTENTION TREMOR: ICD-10-CM

## 2020-03-04 DIAGNOSIS — Z72.0 TOBACCO ABUSE: ICD-10-CM

## 2020-03-04 RX ORDER — CHLORTHALIDONE 25 MG/1
TABLET ORAL
Qty: 90 TAB | Refills: 0 | Status: SHIPPED | OUTPATIENT
Start: 2020-03-04 | End: 2020-05-27

## 2020-03-04 NOTE — TELEPHONE ENCOUNTER
----- Message from Rhea Menchaca RN sent at 3/4/2020 11:28 AM EST -----  Regarding: FW: RADHA MENDOZA/TELEPHONE  Contact: [phone]    ----- Message -----  From: Domenica Padilla  Sent: 3/4/2020  11:22 AM EST  To: [facility] Nurse Pool  Subject: Bob MENDOZA/TELEPHONE                             Caller's first and last name:  Sharad Ragland (spouse)  Reason for call: N/A  Callback required yes/no and why: Yes  Best contact number(s): [phone] or [phone]  Details to clarify the request: Per wife patient was seen 03/02/20 and was given prescription for (Metformin HCL 1000 mg and Chlorthalizone 25 mg). Per wife prescription is not at the pharmacy ([facility]). Wife stated that pt is out of his medications.

## 2020-03-04 NOTE — TELEPHONE ENCOUNTER
Spoke to pt informed pt per Dr Pina Mix that the pt will have to contact his PCP for the refill of metformin and chlorthalidone, Dr Pina Mix will send a script to the [address] [place] [address] on file for the Plaquenil, pt verbally acknowledged understanding

## 2020-03-04 NOTE — TELEPHONE ENCOUNTER
----- Message from Ammy Denton sent at 3/4/2020  1:46 PM EST -----  Regarding: Dr. Berry Smiley Refill  Contact: [phone]  Caller (if not patient): Bethanie Peters    Relationship of caller (if not patient): ( Wife)  Best contact number(s): [phone]  Name of medication and dosage if known: \" Chlorthalidone 25mg\"  Is patient out of this medication (yes/no): yes   Pharmacy name: [facility] listed in chart? (yes/no): Yes   Pharmacy phone number: [phone]  Date of last visit: Friday, May 22, 2020 12:30 PM  Details to clarify the request: Caller stated that pt is out of his medication and need it filled right away.

## 2020-03-05 ENCOUNTER — OFFICE VISIT (OUTPATIENT)
Dept: SLEEP MEDICINE | Age: 55
End: 2020-03-05

## 2020-03-05 VITALS
SYSTOLIC BLOOD PRESSURE: 112 MMHG | RESPIRATION RATE: 19 BRPM | TEMPERATURE: 98 F | BODY MASS INDEX: 42.06 KG/M2 | WEIGHT: 268 LBS | HEIGHT: 67 IN | HEART RATE: 80 BPM | OXYGEN SATURATION: 94 % | DIASTOLIC BLOOD PRESSURE: 77 MMHG

## 2020-03-05 DIAGNOSIS — G47.33 OSA (OBSTRUCTIVE SLEEP APNEA): Primary | ICD-10-CM

## 2020-03-12 ENCOUNTER — TELEPHONE (OUTPATIENT)
Dept: RHEUMATOLOGY | Age: 55
End: 2020-03-12

## 2020-03-12 NOTE — TELEPHONE ENCOUNTER
----- Message from Lindalee Epley, MD sent at 3/10/2020 12:45 PM EDT -----  Regarding: RE: Dr Vu/telephone  The x-ray shows osteoarthritis. I recommend seeing an orthopedist.   ----- Message -----  From: Erica Daniel LPN  Sent: 0/26/4157  11:54 AM EDT  To: Lindalee Epley, MD  Subject: FW: Dr Vu/telephone                             ----- Message -----  From: Traci Greene RN  Sent: 3/10/2020  10:21 AM EDT  To: Marquis Gregg LPN  Subject: FW: Dr Vu/telephone                             ----- Message -----  From: Kamla Garcia  Sent: 3/10/2020  10:04 AM EDT  To: [facility] Nurse Pool  Subject: Dr Bravo Herman is calling to speak to the doctor regarding his shoulder pain that is getting worst, please call pt at [phone].

## 2020-03-12 NOTE — TELEPHONE ENCOUNTER
Spoke to pt informed pt per Dr Carlota Hong that his xrays showed osteoarthritis and the pt should see a orthopedist, pt verbally acknowledged understanding

## 2020-04-06 ENCOUNTER — VIRTUAL VISIT (OUTPATIENT)
Dept: RHEUMATOLOGY | Age: 55
End: 2020-04-06

## 2020-04-06 DIAGNOSIS — M05.742 RHEUMATOID ARTHRITIS INVOLVING BOTH HANDS WITH POSITIVE RHEUMATOID FACTOR (HCC): Primary | ICD-10-CM

## 2020-04-06 DIAGNOSIS — M05.741 RHEUMATOID ARTHRITIS INVOLVING BOTH HANDS WITH POSITIVE RHEUMATOID FACTOR (HCC): Primary | ICD-10-CM

## 2020-04-24 RX ORDER — DICLOFENAC SODIUM 75 MG/1
TABLET, DELAYED RELEASE ORAL
Qty: 60 TAB | Refills: 0 | Status: SHIPPED | OUTPATIENT
Start: 2020-04-24 | End: 2020-07-04

## 2020-05-14 ENCOUNTER — VIRTUAL VISIT (OUTPATIENT)
Dept: FAMILY MEDICINE CLINIC | Age: 55
End: 2020-05-14

## 2020-05-14 DIAGNOSIS — Z71.89 ADVICE GIVEN ABOUT COVID-19 VIRUS INFECTION: ICD-10-CM

## 2020-05-14 DIAGNOSIS — Z76.89 RETURN TO WORK EVALUATION: ICD-10-CM

## 2020-05-14 DIAGNOSIS — Z02.71 DISABILITY EXAMINATION: Primary | ICD-10-CM

## 2020-05-14 NOTE — PROGRESS NOTES
HISTORY OF PRESENT ILLNESS  Diego Hernandez is a 50 y.o. male. HPI  Today's Pt main concerns were provided on virtual visit and Video telemed format,  Present on VV for the concern of the current medical conditions,  pt is w/ comorbid history and stating that the pt has had no traveling and unaware if the pt has been exposed to covid-19 individual,   ptHave been staying at home for couple of wks,   not asthmatic, no wheezing not tobacco abuser, pt has no fever no cough no dyspnea, no ha, not dizzy, nl smell nl taste, no N/V/D,   Not feeling anxious, and finally having no sinus congestion,   no body ache. Off work since Feb of 2020, had shoulder pain got stroidal injection, x2, no work 2/20/20,   Pt is with controlled diabetic states and nicely controlled no trouble returning to work, has a form to be completed for work clearance,   a none Face to face stability examination  Pt states that today is feeling great no mobility device used, pain is 0/10, no tingling no numbness, no rash,  And able to do current job, willing to work,    and today the has no concern except for a letter for clearance to go back to work, the pt does not need any assistance for the activities of daily living, and in addition the is able to be functional at home and able to do any work at this time, in addition,  Does not feel drowsy, nor dizzy and is very functional physically and mentally, In addition, the pt denies dyspnea on exertion, Patient is not experiencing poor balance, nor poor endurance, no hx of  falls since the admission,       The patient is able to do self feeding, bathing, grooming, dressing, toileting, pt is not being dependent on wheelchair , nor any mobility device. will not become fatigue and has no decrease ROM and mentally alert and cognitively functional to operate machinery, and motivated and willing to Go to work full time.        Current Outpatient Medications   Medication Sig Dispense Refill    diclofenac EC (VOLTAREN) 75 mg EC tablet TAKE ONE TABLET BY MOUTH TWICE A DAY 60 Tab 0    chlorthalidone (HYGROTEN) 25 mg tablet TAKE 1 TABLET BY MOUTH EVERY DAY 90 Tab 0    metFORMIN (GLUCOPHAGE) 1,000 mg tablet Take 1 Tab by mouth two (2) times daily (with meals). 60 Tab 1    OTHER C-PAP MACHINE WHILE SLEEPING      hydroxychloroquine (PLAQUENIL) 200 mg tablet Take 2 Tabs by mouth daily. 60 Tab 6    acetaminophen (TYLENOL) 650 mg TbER Take 1 Tab by mouth every eight (8) hours. 21 Tab 0    turmeric 400 mg cap Take  by mouth daily.  fish oil-omega-3 fatty acids (FISH OIL) 340-1,000 mg capsule Take 1 Cap by mouth daily.  cholecalciferol (VITAMIN D3) 1,000 unit tablet Take  by mouth daily.  cyanocobalamin (VITAMIN B-12) 500 mcg tablet Take 500 mcg by mouth daily.  OTHER Embriel injection one per week      folic acid (FOLVITE) 1 mg tablet TAKE 1 TABLET BY MOUTH DAILY 1 Tab 3    methotrexate (RHEUMATREX) 2.5 mg tablet Take  by mouth every Sunday. Takes 4 Tablets Every Sunday      tacrolimus (PROTOPIC) 0.1 % ointment Apply  to affected area two (2) times a day. 30 g 6    aspirin delayed-release 81 mg tablet Take 1 Tab by mouth daily.  30 Tab 11     No Known Allergies  Past Medical History:   Diagnosis Date    Cellulitis and abscess of trunk 7/31/2014    Chronic back pain greater than 3 months duration 7/31/2014    Diabetes ([facility]     Family history of prostate cancer 7/31/2014    Hyperhydrosis disorder 7/31/2014    Hypertension     Intention tremor 12/9/2015    Prediabetes 6/25/2014    RA (rheumatoid arthritis) ([facility] 7/31/2014    Rash of perineum 6/25/2014    Tinea manus 1/19/2016    Tinea pedis of both feet 1/19/2016    Tobacco abuse 6/25/2014     Past Surgical History:   Procedure Laterality Date    HX ORTHOPAEDIC      right leg and knee     Family History   Problem Relation Age of Onset    Diabetes Mother     Hypertension Mother     Stroke Mother     Diabetes Father     Hypertension Father      Social History     Tobacco Use    Smoking status: Former Smoker     Packs/day: 1.00    Smokeless tobacco: Never Used   Substance Use Topics    Alcohol use: Yes     Comment: occasionally      Lab Results   Component Value Date/Time    WBC 8.6 02/17/2020 06:37 AM    HGB 15.2 02/17/2020 06:37 AM    HCT 44.1 02/17/2020 06:37 AM    PLATELET 459 20/08/4914 06:37 AM    MCV 78.5 (L) 02/17/2020 06:37 AM     Lab Results   Component Value Date/Time    Hemoglobin A1c 6.4 (H) 07/17/2019 03:38 PM    Hemoglobin A1c 5.5 01/19/2016 12:26 PM    Hemoglobin A1c 5.7 (H) 06/25/2014 12:19 PM    Glucose 366 (H) 02/17/2020 06:37 AM    Glucose (POC) 276 (H) 02/17/2020 11:09 AM    LDL, calculated 66 07/17/2019 03:38 PM    Creatinine 1.40 (H) 02/17/2020 06:37 AM         Review of Systems   Constitutional: Negative for chills and fever. HENT: Negative for congestion and nosebleeds. Eyes: Negative for blurred vision and pain. Respiratory: Negative for cough, shortness of breath and wheezing. Cardiovascular: Negative for chest pain and leg swelling. Gastrointestinal: Negative for constipation, diarrhea, nausea and vomiting. Genitourinary: Negative for dysuria and frequency. Musculoskeletal: Negative for joint pain and myalgias. Skin: Negative for itching and rash. Neurological: Negative for dizziness, loss of consciousness and headaches. Psychiatric/Behavioral: Negative for depression. The patient is not nervous/anxious and does not have insomnia. Physical Exam  Constitutional:       Appearance: Normal appearance. HENT:      Head: Normocephalic and atraumatic. Nose: Nose normal. No congestion. Neurological:      Mental Status: He is alert and oriented to person, place, and time. Psychiatric:         Mood and Affect: Mood normal.         Behavior: Behavior normal.         Thought Content:  Thought content normal.         Judgment: Judgment normal.         ASSESSMENT and PLAN  Diagnoses and all orders for this visit:    1. Disability examination    2. Return to work evaluation    3. Advice given about COVID-19 virus infection      Secondary to the patient chronic medical conditions,   form was completed, w/ with multiple questions answered to the best knowledge will keep a copy in the chart for further correspondence patient was given signed completed patient was informed about the safety and  regulations regarding this condition patient was also advised to follow-up with HR for further guidelines patient acknowledged understanding and agreed with today's recommendations    Concern abdout COVID-19 addressed and    Pursuant to the emergency declaration under the [address] and [facility] [place] [address] waiver authority and the [employer] Act, this Virtual Visit was conducted, with patient's consent, to reduce the patient's risk of exposure to COVID-19 and provide continuity of care for an established patient. Pt was also told if develop dyspnea needs to call 911 or go to er, call for kinsey advise, pt agreed with todays recommendations,     Services were provided through a Video synchronous discussion virtually to substitute for in-person appointment.

## 2020-05-14 NOTE — PROGRESS NOTES
Chief Complaint   Patient presents with    Other     Pt getting documentation completed. 1. Have you been to the ER, urgent care clinic since your last visit? Hospitalized since your last visit? No    2. Have you seen or consulted any other health care providers outside of the [address] [place] [address] since your last visit? Include any pap smears or colon screening.  No

## 2020-05-15 ENCOUNTER — TELEPHONE (OUTPATIENT)
Dept: RHEUMATOLOGY | Age: 55
End: 2020-05-15

## 2020-05-15 NOTE — TELEPHONE ENCOUNTER
Spoke to pt regarding the unemployment forms pt stated to disregard the message because the pt PCP has already completed the unemployment forms, I verbally acknowledged understanding

## 2020-05-15 NOTE — TELEPHONE ENCOUNTER
----- Message from Pratik Cesar MD sent at 5/15/2020 10:38 AM EDT -----  Regarding: RE: Dr Vu/telephone  Primary care. We are not doing thses forms. ----- Message -----  From: Marycarmen Hoover LPN  Sent: 7/73/8174   9:58 AM EDT  To: Pratik Cesar MD  Subject: FW: Dr Vu/telephone                          Did you receive the paperwork?  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 Callback required yes/no and why: yes to confirm the form was  received.       Best contact number(s):[phone], pt's number      Details to clarify the request:      Lindalee Merlin

## 2020-05-22 ENCOUNTER — VIRTUAL VISIT (OUTPATIENT)
Dept: FAMILY MEDICINE CLINIC | Age: 55
End: 2020-05-22

## 2020-05-27 DIAGNOSIS — I10 HTN, GOAL BELOW 130/80: ICD-10-CM

## 2020-05-27 DIAGNOSIS — R73.03 PREDIABETES: ICD-10-CM

## 2020-05-27 DIAGNOSIS — G25.2 INTENTION TREMOR: ICD-10-CM

## 2020-05-27 DIAGNOSIS — Z72.0 TOBACCO ABUSE: ICD-10-CM

## 2020-05-27 RX ORDER — CHLORTHALIDONE 25 MG/1
TABLET ORAL
Qty: 90 TAB | Refills: 0 | Status: SHIPPED | OUTPATIENT
Start: 2020-05-27 | End: 2020-07-28

## 2020-06-02 ENCOUNTER — TELEPHONE (OUTPATIENT)
Dept: FAMILY MEDICINE CLINIC | Age: 55
End: 2020-06-02

## 2020-06-02 NOTE — TELEPHONE ENCOUNTER
Pt calling about his unemployment form     He will fax it over       Best number to reach him is [phone]

## 2020-06-19 RX ORDER — ETANERCEPT 50 MG/ML
SOLUTION SUBCUTANEOUS
COMMUNITY
Start: 2020-05-21 | End: 2020-06-19 | Stop reason: SDUPTHER

## 2020-06-22 RX ORDER — ETANERCEPT 50 MG/ML
50 SOLUTION SUBCUTANEOUS
Qty: 4 EACH | Refills: 2 | Status: SHIPPED | OUTPATIENT
Start: 2020-06-22 | End: 2021-04-20

## 2020-07-04 RX ORDER — DICLOFENAC SODIUM 75 MG/1
TABLET, DELAYED RELEASE ORAL
Qty: 60 TAB | Refills: 0 | Status: SHIPPED | OUTPATIENT
Start: 2020-07-04 | End: 2020-09-22

## 2020-07-08 DIAGNOSIS — E11.65 TYPE 2 DIABETES MELLITUS WITH HYPERGLYCEMIA, WITH LONG-TERM CURRENT USE OF INSULIN (HCC): ICD-10-CM

## 2020-07-08 DIAGNOSIS — Z79.4 TYPE 2 DIABETES MELLITUS WITH HYPERGLYCEMIA, WITH LONG-TERM CURRENT USE OF INSULIN (HCC): ICD-10-CM

## 2020-07-08 RX ORDER — METFORMIN HYDROCHLORIDE 1000 MG/1
TABLET ORAL
Qty: 60 TAB | Refills: 0 | Status: SHIPPED | OUTPATIENT
Start: 2020-07-08 | End: 2020-11-06

## 2020-07-10 DIAGNOSIS — M05.741 RHEUMATOID ARTHRITIS INVOLVING BOTH HANDS WITH POSITIVE RHEUMATOID FACTOR (HCC): Primary | ICD-10-CM

## 2020-07-10 DIAGNOSIS — M05.742 RHEUMATOID ARTHRITIS INVOLVING BOTH HANDS WITH POSITIVE RHEUMATOID FACTOR (HCC): Primary | ICD-10-CM

## 2020-07-13 ENCOUNTER — TELEPHONE (OUTPATIENT)
Dept: RHEUMATOLOGY | Age: 55
End: 2020-07-13

## 2020-07-13 RX ORDER — HYDROXYCHLOROQUINE SULFATE 200 MG/1
400 TABLET, FILM COATED ORAL DAILY
Qty: 60 TAB | Refills: 6 | Status: SHIPPED | OUTPATIENT
Start: 2020-07-13 | End: 2021-07-19 | Stop reason: SDUPTHER

## 2020-07-13 NOTE — TELEPHONE ENCOUNTER
PT was contacted and informed that PA is still in process and he should hear something in a couple of days.  RW----- Message from Alvarado Olsen LPN sent at 0/02/5769  2:50 PM EDT -----  Regarding: FW: Dr Brody Salvador    ----- Message -----  From: Obdulia Pineda: 7/10/2020   2:43 PM EDT  To: [facility] Nurse Pool  Subject: Dr Beatris Alves first and last name:      Reason for call:pt's wife [address] [place] [address] (p) [phone] is waiting to see if the PA went thru from his insurance company for the hydroxychloroquine      Callback required yes/no and why:yes to confirm if PA went thru       Best contact number(s):[phone]      Details to clarify the request:      Judith Whitfield

## 2020-07-14 ENCOUNTER — TELEPHONE (OUTPATIENT)
Dept: RHEUMATOLOGY | Age: 55
End: 2020-07-14

## 2020-07-14 NOTE — TELEPHONE ENCOUNTER
Approvedon July 13  Your PA request has been approved. Additional information will be provided in the approval communication. (Message 1148)Hydroxychlouriquine 200mg.  RW

## 2020-07-17 ENCOUNTER — HOSPITAL ENCOUNTER (EMERGENCY)
Age: 55
Discharge: HOME | End: 2020-07-17
Attending: EMERGENCY MEDICINE
Payer: COMMERCIAL

## 2020-07-17 VITALS
BODY MASS INDEX: 44.67 KG/M2 | WEIGHT: 284.61 LBS | HEIGHT: 67 IN | SYSTOLIC BLOOD PRESSURE: 146 MMHG | HEART RATE: 75 BPM | TEMPERATURE: 98.5 F | OXYGEN SATURATION: 98 % | DIASTOLIC BLOOD PRESSURE: 89 MMHG | RESPIRATION RATE: 16 BRPM

## 2020-07-17 DIAGNOSIS — E87.6 ACUTE HYPOKALEMIA: ICD-10-CM

## 2020-07-17 DIAGNOSIS — E11.65 UNCONTROLLED TYPE 2 DIABETES MELLITUS WITH HYPERGLYCEMIA (HCC): ICD-10-CM

## 2020-07-17 DIAGNOSIS — R60.0 LOCALIZED EDEMA: Primary | ICD-10-CM

## 2020-07-17 DIAGNOSIS — T63.451A HORNET STING, ACCIDENTAL OR UNINTENTIONAL, INITIAL ENCOUNTER: ICD-10-CM

## 2020-07-17 LAB
ALBUMIN SERPL-MCNC: 3.2 G/DL (ref 3.5–5)
ALBUMIN/GLOB SERPL: 0.8 {RATIO} (ref 1.1–2.2)
ALP SERPL-CCNC: 75 U/L (ref 45–117)
ALT SERPL-CCNC: 26 U/L (ref 12–78)
ANION GAP SERPL CALC-SCNC: 7 MMOL/L (ref 5–15)
AST SERPL-CCNC: 16 U/L (ref 15–37)
BASOPHILS # BLD: 0 K/UL (ref 0–0.1)
BASOPHILS NFR BLD: 0 % (ref 0–1)
BILIRUB SERPL-MCNC: 0.4 MG/DL (ref 0.2–1)
BUN SERPL-MCNC: 9 MG/DL (ref 6–20)
BUN/CREAT SERPL: 10 (ref 12–20)
CALCIUM SERPL-MCNC: 8.5 MG/DL (ref 8.5–10.1)
CHLORIDE SERPL-SCNC: 102 MMOL/L (ref 97–108)
CO2 SERPL-SCNC: 29 MMOL/L (ref 21–32)
CREAT SERPL-MCNC: 0.9 MG/DL (ref 0.7–1.3)
DIFFERENTIAL METHOD BLD: ABNORMAL
EOSINOPHIL # BLD: 0.3 K/UL (ref 0–0.4)
EOSINOPHIL NFR BLD: 3 % (ref 0–7)
ERYTHROCYTE [DISTWIDTH] IN BLOOD BY AUTOMATED COUNT: 14.6 % (ref 11.5–14.5)
GLOBULIN SER CALC-MCNC: 3.9 G/DL (ref 2–4)
GLUCOSE SERPL-MCNC: 172 MG/DL (ref 65–100)
HCT VFR BLD AUTO: 38.5 % (ref 36.6–50.3)
HGB BLD-MCNC: 13.2 G/DL (ref 12.1–17)
IMM GRANULOCYTES # BLD AUTO: 0 K/UL (ref 0–0.04)
IMM GRANULOCYTES NFR BLD AUTO: 0 % (ref 0–0.5)
LYMPHOCYTES # BLD: 2.3 K/UL (ref 0.8–3.5)
LYMPHOCYTES NFR BLD: 29 % (ref 12–49)
MCH RBC QN AUTO: 27.5 PG (ref 26–34)
MCHC RBC AUTO-ENTMCNC: 34.3 G/DL (ref 30–36.5)
MCV RBC AUTO: 80.2 FL (ref 80–99)
MONOCYTES # BLD: 0.6 K/UL (ref 0–1)
MONOCYTES NFR BLD: 7 % (ref 5–13)
NEUTS SEG # BLD: 4.7 K/UL (ref 1.8–8)
NEUTS SEG NFR BLD: 61 % (ref 32–75)
NRBC # BLD: 0 K/UL (ref 0–0.01)
NRBC BLD-RTO: 0 PER 100 WBC
PLATELET # BLD AUTO: 328 K/UL (ref 150–400)
PMV BLD AUTO: 9.9 FL (ref 8.9–12.9)
POTASSIUM SERPL-SCNC: 2.9 MMOL/L (ref 3.5–5.1)
PROT SERPL-MCNC: 7.1 G/DL (ref 6.4–8.2)
RBC # BLD AUTO: 4.8 M/UL (ref 4.1–5.7)
SODIUM SERPL-SCNC: 138 MMOL/L (ref 136–145)
WBC # BLD AUTO: 7.9 K/UL (ref 4.1–11.1)

## 2020-07-17 PROCEDURE — 85025 COMPLETE CBC W/AUTO DIFF WBC: CPT

## 2020-07-17 PROCEDURE — 74011000250 HC RX REV CODE- 250: Performed by: EMERGENCY MEDICINE

## 2020-07-17 PROCEDURE — 36415 COLL VENOUS BLD VENIPUNCTURE: CPT

## 2020-07-17 PROCEDURE — 74011250636 HC RX REV CODE- 250/636: Performed by: EMERGENCY MEDICINE

## 2020-07-17 PROCEDURE — 80053 COMPREHEN METABOLIC PANEL: CPT

## 2020-07-17 PROCEDURE — 99283 EMERGENCY DEPT VISIT LOW MDM: CPT

## 2020-07-17 PROCEDURE — 96374 THER/PROPH/DIAG INJ IV PUSH: CPT

## 2020-07-17 PROCEDURE — 74011250637 HC RX REV CODE- 250/637: Performed by: EMERGENCY MEDICINE

## 2020-07-17 PROCEDURE — 96375 TX/PRO/DX INJ NEW DRUG ADDON: CPT

## 2020-07-17 RX ORDER — FAMOTIDINE 20 MG/1
20 TABLET, FILM COATED ORAL 2 TIMES DAILY
Qty: 20 TAB | Refills: 0 | Status: SHIPPED | OUTPATIENT
Start: 2020-07-17 | End: 2020-07-27

## 2020-07-17 RX ORDER — PREDNISONE 10 MG/1
TABLET ORAL
Qty: 21 TAB | Refills: 0 | Status: SHIPPED | OUTPATIENT
Start: 2020-07-17 | End: 2020-08-26 | Stop reason: ALTCHOICE

## 2020-07-17 RX ORDER — POTASSIUM CHLORIDE 750 MG/1
40 TABLET, FILM COATED, EXTENDED RELEASE ORAL
Status: COMPLETED | OUTPATIENT
Start: 2020-07-17 | End: 2020-07-17

## 2020-07-17 RX ORDER — DIPHENHYDRAMINE HYDROCHLORIDE 50 MG/ML
50 INJECTION, SOLUTION INTRAMUSCULAR; INTRAVENOUS
Status: COMPLETED | OUTPATIENT
Start: 2020-07-17 | End: 2020-07-17

## 2020-07-17 RX ORDER — KETOROLAC TROMETHAMINE 30 MG/ML
30 INJECTION, SOLUTION INTRAMUSCULAR; INTRAVENOUS
Status: COMPLETED | OUTPATIENT
Start: 2020-07-17 | End: 2020-07-17

## 2020-07-17 RX ORDER — DIPHENHYDRAMINE HCL 25 MG
50 CAPSULE ORAL
Qty: 20 CAP | Refills: 0 | Status: SHIPPED | OUTPATIENT
Start: 2020-07-17 | End: 2020-07-27

## 2020-07-17 RX ADMIN — METHYLPREDNISOLONE SODIUM SUCCINATE 125 MG: 125 INJECTION, POWDER, FOR SOLUTION INTRAMUSCULAR; INTRAVENOUS at 17:42

## 2020-07-17 RX ADMIN — KETOROLAC TROMETHAMINE 30 MG: 30 INJECTION, SOLUTION INTRAMUSCULAR at 17:42

## 2020-07-17 RX ADMIN — POTASSIUM CHLORIDE 40 MEQ: 750 TABLET, FILM COATED, EXTENDED RELEASE ORAL at 19:15

## 2020-07-17 RX ADMIN — FAMOTIDINE 20 MG: 10 INJECTION INTRAVENOUS at 17:42

## 2020-07-17 RX ADMIN — DIPHENHYDRAMINE HYDROCHLORIDE 50 MG: 50 INJECTION, SOLUTION INTRAMUSCULAR; INTRAVENOUS at 17:42

## 2020-07-17 NOTE — ED NOTES
Assumed care of pt via triage. Pt states he was stung by a couple hornets about 2 days ago and has been having increased right hand/arm swelling & tightness. Pt states he hasnt been able to sleep d/t the increased itching and pain/tightness in his arm. Pt denies being allergic to bee stings. Pt also denies any trouble breathing or swallowing. Pt resting comfortably on stretcher in position of comfort. Pt in no apparent distress at this time. Call bell within reach. Side rails x1. Pt a/o x4. Stretcher locked in lowest position. Pt aware of plan to await for MD/PA-C/NP assessment, and pt/family verbalizes understanding. Will continue to monitor pt condition.

## 2020-07-17 NOTE — DISCHARGE INSTRUCTIONS
Patient Education        Leg and Ankle Edema: Care Instructions  Your Care Instructions  Swelling in the legs, ankles, and feet is called edema. It is common after you sit or stand for a while. Long plane flights or car rides often cause swelling in the legs and feet. You may also have swelling if you have to stand for long periods of time at your job. Problems with the veins in the legs (varicose veins) and changes in hormones can also cause swelling. Sometimes the swelling in the ankles and feet is caused by a more serious problem, such as heart failure, infection, blood clots, or liver or kidney disease. Follow-up care is a key part of your treatment and safety. Be sure to make and go to all appointments, and call your doctor if you are having problems. It's also a good idea to know your test results and keep a list of the medicines you take. How can you care for yourself at home? · If your doctor gave you medicine, take it as prescribed. Call your doctor if you think you are having a problem with your medicine. · Whenever you are resting, raise your legs up. Try to keep the swollen area higher than the level of your heart. · Take breaks from standing or sitting in one position. ? Walk around to increase the blood flow in your lower legs. ? Move your feet and ankles often while you stand, or tighten and relax your leg muscles. · Wear support stockings. Put them on in the morning, before swelling gets worse. · Eat a balanced diet. Lose weight if you need to. · Limit the amount of salt (sodium) in your diet. Salt holds fluid in the body and may increase swelling. When should you call for help? [id number] anytime you think you may need emergency care. For example, call if:  · You have symptoms of a blood clot in your lung (called a pulmonary embolism). These may include:  ? Sudden chest pain. ? Trouble breathing. ? Coughing up blood.   Call your doctor now or seek immediate medical care if:  · You have signs of a blood clot, such as:  ? Pain in your calf, back of the knee, thigh, or groin. ? Redness and swelling in your leg or groin. · You have symptoms of infection, such as:  ? Increased pain, swelling, warmth, or redness. ? Red streaks or pus. ? A fever. Watch closely for changes in your health, and be sure to contact your doctor if:  · Your swelling is getting worse. · You have new or worsening pain in your legs. · You do not get better as expected. Where can you learn more? Go to http://jada-moreno.info/  Enter Y485 in the search box to learn more about \"Leg and Ankle Edema: Care Instructions. \"  Current as of: June 26, 2019               Content Version: 12.5  © 7256-3649 [employer], [employer]. Care instructions adapted under license by [employer] (which disclaims liability or warranty for this information). If you have questions about a medical condition or this instruction, always ask your healthcare professional. [place] [address] any warranty or liability for your use of this information.

## 2020-07-17 NOTE — LETTER
[address] 
[facility] EMERGENCY DEPT 
[address] [place] [address] Rudy Ibanez [id number] [phone] Work/School Note Date: 7/17/2020 To Whom It May concern: 
 
Crystal Villatoro was seen and treated today in the emergency room by the following provider(s): 
Attending Provider: Sharon Peterson MD. Crystal Villatoro may return to work on 7/20/20. Sincerely, Juanito Angulo MD

## 2020-07-17 NOTE — ED PROVIDER NOTES
EMERGENCY DEPARTMENT HISTORY AND PHYSICAL EXAM      Please note that this dictation was completed with [email or web address], the computer voice recognition software. Quite often unanticipated grammatical, syntax, homophones, and other interpretive errors are inadvertently transcribed by the computer software. Please disregard these errors and any errors that have escaped final proofreading. Thank you. Date: 7/17/2020  Patient Name: Crystal Villatoro  Patient Age and Sex: 50 y.o. male    History of Presenting Illness     Chief Complaint   Patient presents with    Hand Swelling     Ambulatory into the ED with c/o Rt hand swelling after a hornet sting to his Rt wrist about 3-4 days. Pmh DM and RA.  Bee sting       History Provided By: Patient    HPI: Crystal Villatoro, 50 y.o. male with past medical history as documented below presents to the ED with c/o of right hand and forearm swelling after being bitten by a hornet about 3-4 days ago. Pt states that he was outside working in the yard when he was bitten by a hornet on his right wrist.  He states that he was able to pull out sting. Patient notes that he has had increasing swelling and tightness ever since. He says that he has been unable to sleep due to the itching. He denies previous allergies or anaphylaxis. He denies any other trauma. He has tried no medications yet for his sx's. He denies fevers, warmth or skin changes. Pt denies any other alleviating or exacerbating factors. Additionally, pt specifically denies any recent fever, chills, headache, nausea, vomiting, abdominal pain, CP, SOB, lightheadedness, dizziness, numbness, weakness, lower extremity swelling, heart palpitations, urinary sxs, diarrhea, constipation, melena, hematochezia, cough, or congestion. There are no other complaints, changes or physical findings at this time.      PCP: Etta Cole MD    Past History   Past Medical History:  Past Medical History:   Diagnosis Date    Cellulitis and abscess of trunk 7/31/2014    Chronic back pain greater than 3 months duration 7/31/2014    [facility]     Family history of prostate cancer 7/31/2014    Hyperhydrosis disorder 7/31/2014    Hypertension     Intention tremor 12/9/2015    Prediabetes 6/25/2014    [facility] 7/31/2014    Rash of perineum 6/25/2014    Tinea manus 1/19/2016    Tinea pedis of both feet 1/19/2016    Tobacco abuse 6/25/2014       Past Surgical History:  Past Surgical History:   Procedure Laterality Date    HX ORTHOPAEDIC      right leg and knee       Family History:  Family History   Problem Relation Age of Onset    Diabetes Mother     Hypertension Mother     Stroke Mother     Diabetes Father     Hypertension Father        Social History:  Social History     Tobacco Use    Smoking status: Former Smoker     Packs/day: 1.00    Smokeless tobacco: Never Used   Substance Use Topics    Alcohol use: Yes     Comment: occasionally    Drug use: No       Allergies:  No Known Allergies    Current Medications:  No current facility-administered medications on file prior to encounter. Current Outpatient Medications on File Prior to Encounter   Medication Sig Dispense Refill    hydrOXYchloroQUINE (PLAQUENIL) 200 mg tablet Take 2 Tabs by mouth daily. 60 Tab 6    metFORMIN (GLUCOPHAGE) 1,000 mg tablet TAKE ONE TABLET BY MOUTH TWICE A DAY WITH MEALS 60 Tab 0    diclofenac EC (VOLTAREN) 75 mg EC tablet TAKE ONE TABLET BY MOUTH TWICE A DAY 60 Tab 0    EnbreL SureClick 50 mg/mL (1 mL) injection 0.98 mL by SubCUTAneous route every seven (7) days. 4 Each 2    chlorthalidone (HYGROTEN) 25 mg tablet TAKE 1 TABLET BY MOUTH EVERY DAY 90 Tab 0    acetaminophen (TYLENOL) 650 mg TbER Take 1 Tab by mouth every eight (8) hours. 21 Tab 0    OTHER C-PAP MACHINE WHILE SLEEPING      turmeric 400 mg cap Take  by mouth daily.       fish oil-omega-3 fatty acids (FISH OIL) 340-1,000 mg capsule Take 1 Cap by mouth daily.      cholecalciferol (VITAMIN D3) 1,000 unit tablet Take  by mouth daily.  cyanocobalamin (VITAMIN B-12) 500 mcg tablet Take 500 mcg by mouth daily.  OTHER Embriel injection one per week      folic acid (FOLVITE) 1 mg tablet TAKE 1 TABLET BY MOUTH DAILY 1 Tab 3    methotrexate (RHEUMATREX) 2.5 mg tablet Take  by mouth every Sunday. Takes 4 Tablets Every Sunday      tacrolimus (PROTOPIC) 0.1 % ointment Apply  to affected area two (2) times a day. 30 g 6    aspirin delayed-release 81 mg tablet Take 1 Tab by mouth daily. 30 Tab 11       Review of Systems   Review of Systems   Constitutional: Negative. Negative for chills and fever. HENT: Negative. Negative for congestion, facial swelling, rhinorrhea, sore throat, trouble swallowing and voice change. Eyes: Negative. Respiratory: Negative. Negative for apnea, cough, chest tightness, shortness of breath and wheezing. Cardiovascular: Negative. Negative for chest pain, palpitations and leg swelling. Gastrointestinal: Negative. Negative for abdominal distention, abdominal pain, blood in stool, constipation, diarrhea, nausea and vomiting. Endocrine: Negative. Negative for cold intolerance, heat intolerance and polyuria. Genitourinary: Negative. Negative for difficulty urinating, dysuria, flank pain, frequency, hematuria and urgency. Musculoskeletal: Positive for arthralgias and myalgias. Negative for back pain, neck pain and neck stiffness. Skin: Positive for rash. Negative for color change. Neurological: Negative. Negative for dizziness, syncope, facial asymmetry, speech difficulty, weakness, light-headedness, numbness and headaches. Hematological: Negative. Does not bruise/bleed easily. Psychiatric/Behavioral: Negative. Negative for confusion and self-injury. The patient is not nervous/anxious. Physical Exam   Physical Exam  Vitals signs and nursing note reviewed.    Constitutional:       Appearance: He is well-developed. He is not toxic-appearing. HENT:      Head: Normocephalic and atraumatic. Mouth/Throat:      Pharynx: No posterior oropharyngeal erythema. Eyes:      Conjunctiva/sclera: Conjunctivae normal.      Pupils: Pupils are equal, round, and reactive to light. Neck:      Musculoskeletal: Normal range of motion. Cardiovascular:      Rate and Rhythm: Normal rate and regular rhythm. Heart sounds: Normal heart sounds. No murmur. No friction rub. No gallop. Pulmonary:      Effort: Pulmonary effort is normal. No respiratory distress. Breath sounds: Normal breath sounds. No wheezing or rales. Chest:      Chest wall: No tenderness. Abdominal:      General: Bowel sounds are normal. There is no distension. Palpations: Abdomen is soft. There is no mass. Tenderness: There is no abdominal tenderness. There is no guarding or rebound. Musculoskeletal: Normal range of motion. General: Swelling and tenderness present. No deformity. Comments: Site of hornet sting on medial right wrist noted, no erythema, tenderness or warmth, no joint swelling, FROM of joints without pain, minimal swelling noted to dorsal aspect of right hand and forearm, compartments soft, NVI distally   Skin:     General: Skin is warm. Findings: No rash. Neurological:      Mental Status: He is alert and oriented to person, place, and time. Cranial Nerves: No cranial nerve deficit. Motor: No abnormal muscle tone. Coordination: Coordination normal.      Deep Tendon Reflexes: Reflexes normal.   Psychiatric:         Behavior: Behavior is cooperative. Diagnostic Study Results     Labs -  Recent Results (from the past 24 hour(s))   CBC WITH AUTOMATED DIFF    Collection Time: 07/17/20  5:30 PM   Result Value Ref Range    WBC 7.9 4.1 - 11.1 K/uL    RBC 4.80 4. 10 - 5.70 M/uL    HGB 13.2 12.1 - 17.0 g/dL    HCT 38.5 36.6 - 50.3 %    MCV 80.2 80.0 - 99.0 FL    MCH 27.5 26.0 - 34.0 PG MCHC 34.3 30.0 - 36.5 g/dL    RDW 14.6 (H) 11.5 - 14.5 %    PLATELET 217 013 - 660 K/uL    MPV 9.9 8.9 - 12.9 FL    NRBC 0.0 0  WBC    ABSOLUTE NRBC 0.00 0.00 - 0.01 K/uL    NEUTROPHILS 61 32 - 75 %    LYMPHOCYTES 29 12 - 49 %    MONOCYTES 7 5 - 13 %    EOSINOPHILS 3 0 - 7 %    BASOPHILS 0 0 - 1 %    IMMATURE GRANULOCYTES 0 0.0 - 0.5 %    ABS. NEUTROPHILS 4.7 1.8 - 8.0 K/UL    ABS. LYMPHOCYTES 2.3 0.8 - 3.5 K/UL    ABS. MONOCYTES 0.6 0.0 - 1.0 K/UL    ABS. EOSINOPHILS 0.3 0.0 - 0.4 K/UL    ABS. BASOPHILS 0.0 0.0 - 0.1 K/UL    ABS. IMM. GRANS. 0.0 0.00 - 0.04 K/UL    DF AUTOMATED     METABOLIC PANEL, COMPREHENSIVE    Collection Time: 07/17/20  5:30 PM   Result Value Ref Range    Sodium 138 136 - 145 mmol/L    Potassium 2.9 (L) 3.5 - 5.1 mmol/L    Chloride 102 97 - 108 mmol/L    CO2 29 21 - 32 mmol/L    Anion gap 7 5 - 15 mmol/L    Glucose 172 (H) 65 - 100 mg/dL    BUN 9 6 - 20 MG/DL    Creatinine 0.90 0.70 - 1.30 MG/DL    BUN/Creatinine ratio 10 (L) 12 - 20      GFR est AA >60 >60 ml/min/1.73m2    GFR est non-AA >60 >60 ml/min/1.73m2    Calcium 8.5 8.5 - 10.1 MG/DL    Bilirubin, total 0.4 0.2 - 1.0 MG/DL    ALT (SGPT) 26 12 - 78 U/L    AST (SGOT) 16 15 - 37 U/L    Alk. phosphatase 75 45 - 117 U/L    Protein, total 7.1 6.4 - 8.2 g/dL    Albumin 3.2 (L) 3.5 - 5.0 g/dL    Globulin 3.9 2.0 - 4.0 g/dL    A-G Ratio 0.8 (L) 1.1 - 2.2         Radiologic Studies -   No orders to display     CT Results  (Last 48 hours)    None        CXR Results  (Last 48 hours)    None          Medical Decision Making   I am the first provider for this patient. I reviewed the vital signs, available nursing notes, past medical history, past surgical history, family history and social history. Vital Signs-Reviewed the patient's vital signs.   Patient Vitals for the past 24 hrs:   Temp Pulse Resp BP SpO2   07/17/20 1738 -- -- -- -- 98 %   07/17/20 1732 -- -- -- 146/89 --   07/17/20 1619 98.5 °F (36.9 °C) 75 16 (!) 128/98 98 % Pulse Oximetry Analysis - 98% on RA    Cardiac Monitor:   Rate: 75 bpm  Rhythm: Normal Sinus Rhythm      Records Reviewed: Nursing Notes, Old Medical Records, Previous electrocardiograms, Previous Radiology Studies and Previous Laboratory Studies    Provider Notes (Medical Decision Making):   Pt presenting with localized edema after hornet sting, no signs of infection, compartments soft and NVI distally, presentation not c/w compartment syndrome, necrotizing fasciitis or DVT. Will check labs, provide antihistamine and steroid, reassess. ED Course:   Initial assessment performed. The patients presenting problems have been discussed, and they are in agreement with the care plan formulated and outlined with them. I have encouraged them to ask questions as they arise throughout their visit. HYPERTENSION COUNSELING  For 10 minutes, education was provided to the patient today regarding their hypertension. Patient is made aware of their elevated blood pressure and is instructed to follow up this week with their Primary Care for a recheck. Patient is counseled regarding consequences of chronic, uncontrolled hypertension including kidney disease, heart disease, stroke or even death. Patient states their understanding and agrees to follow up this week. Additionally, during their visit, I discussed sodium restriction, maintaining ideal body weight and regular exercise program as physiologic means to achieve blood pressure control. The patient will strive towards this. I reviewed our electronic medical record system for any past medical records that were available that may contribute to the patient's current condition, the nursing notes and vital signs from today's visit.   Jyoti Roberts MD    ED Orders Placed :  Orders Placed This Encounter    CBC WITH AUTOMATED DIFF    METABOLIC PANEL, COMPREHENSIVE    diphenhydrAMINE (BENADRYL) injection 50 mg    methylPREDNISolone (PF) (Solu-MEDROL) injection 125 mg    DISCONTD: famotidine (PF) (PEPCID) 20 mg in 0.9% sodium chloride 10 mL injection    ketorolac (TORADOL) injection 30 mg    famotidine (PF) (PEPCID) 20 mg in 0.9% sodium chloride 10 mL injection    potassium chloride SR (KLOR-CON 10) tablet 40 mEq    diphenhydrAMINE (BenadryL) 25 mg capsule    predniSONE (STERAPRED DS) 10 mg dose pack    famotidine (Pepcid) 20 mg tablet     ED Medications Administered:  Medications   diphenhydrAMINE (BENADRYL) injection 50 mg (50 mg IntraVENous Given 7/17/20 1742)   methylPREDNISolone (PF) (Solu-MEDROL) injection 125 mg (125 mg IntraVENous Given 7/17/20 1742)   ketorolac (TORADOL) injection 30 mg (30 mg IntraVENous Given 7/17/20 1742)   famotidine (PF) (PEPCID) 20 mg in 0.9% sodium chloride 10 mL injection (20 mg IntraVENous Given 7/17/20 1742)   potassium chloride SR (KLOR-CON 10) tablet 40 mEq (40 mEq Oral Given 7/17/20 1915)        Progress Note:  Patient has been reassessed and reports feeling better and symptoms have improved significantly after ED treatment. Patient feels comfortable going home with close follow-up. Fernanod Leblanc's final labs and imaging have been reviewed with him and available family and/or caregiver. They have been counseled regarding his diagnosis. He verbally conveys understanding and agreement of the signs, symptoms, diagnosis, treatment and prognosis and additionally agrees to follow up as recommended with Dr. Tae English MD and/or specialist in 24 - 48 hours. He also agrees with the care-plan we created together and conveys that all of his questions have been answered. I have also put together some discharge instructions for him that include: 1) educational information regarding their diagnosis, 2) how to care for their diagnosis at home, as well a 3) list of reasons why they would want to return to the ED prior to their follow-up appointment should the patient's condition change or symptoms worsen.     I have answered all questions to the patient's satisfaction. Strict return precautions given. He both understood and agreed with plan as discussed. Vital signs stable for discharge. Pt very appreciative of care today. Disposition: Discharge  The pt is ready for discharge. The pt's signs, symptoms, diagnosis, and discharge instructions have been discussed and pt has conveyed their understanding. The pt is to follow up as recommended or return to ER should their symptoms worsen. Plan has been discussed and pt is in full agreement. Plan:  1. Return precautions as discussed. 2.   Discharge Medication List as of 7/17/2020  7:18 PM      START taking these medications    Details   diphenhydrAMINE (BenadryL) 25 mg capsule Take 2 Caps by mouth every six (6) hours as needed for Skin Irritation or Allergies for up to 10 days. , Print, Disp-20 Cap,R-0      predniSONE (STERAPRED DS) 10 mg dose pack Take as prescribed, Print, Disp-21 Tab,R-0      famotidine (Pepcid) 20 mg tablet Take 1 Tab by mouth two (2) times a day for 10 days. , Print, Disp-20 Tab,R-0         CONTINUE these medications which have NOT CHANGED    Details   hydrOXYchloroQUINE (PLAQUENIL) 200 mg tablet Take 2 Tabs by mouth daily. , Normal, Disp-60 Tab,R-6      metFORMIN (GLUCOPHAGE) 1,000 mg tablet TAKE ONE TABLET BY MOUTH TWICE A DAY WITH MEALS, Normal, Disp-60 Tab,R-0      diclofenac EC (VOLTAREN) 75 mg EC tablet TAKE ONE TABLET BY MOUTH TWICE A DAY, Normal, Disp-60 Tab, R-0      EnbreL SureClick 50 mg/mL (1 mL) injection 0.98 mL by SubCUTAneous route every seven (7) days. , Normal, Disp-4 Each,R-2,JOSEP      chlorthalidone (HYGROTEN) 25 mg tablet TAKE 1 TABLET BY MOUTH EVERY DAY, Normal, Disp-90 Tab, R-0      acetaminophen (TYLENOL) 650 mg TbER Take 1 Tab by mouth every eight (8) hours. , Normal, Disp-21 Tab, R-0      !! OTHER C-PAP MACHINE WHILE SLEEPING, Historical Med      turmeric 400 mg cap Take  by mouth daily. , Historical Med      fish oil-omega-3 fatty acids (FISH OIL) 340-1,000 mg capsule Take 1 Cap by mouth daily. , Historical Med      cholecalciferol (VITAMIN D3) 1,000 unit tablet Take  by mouth daily. , Historical Med      cyanocobalamin (VITAMIN B-12) 500 mcg tablet Take 500 mcg by mouth daily. , Historical Med      !! OTHER Embriel injection one per week, Historical Med      folic acid (FOLVITE) 1 mg tablet TAKE 1 TABLET BY MOUTH DAILY, Normal, Disp-1 Tab, R-3      methotrexate (RHEUMATREX) 2.5 mg tablet Take  by mouth every Sunday. Takes 4 Tablets Every Sunday, Historical Med      tacrolimus (PROTOPIC) 0.1 % ointment Apply  to affected area two (2) times a day., Normal, Disp-30 g, R-6      aspirin delayed-release 81 mg tablet Take 1 Tab by mouth daily. , No Print, Disp-30 Tab, R-11       !! - Potential duplicate medications found. Please discuss with provider. 3.   Follow-up Information     Follow up With Specialties Details Why [address], [address], [address] [place] [address]  [phone]      [facility] EMERGENCY DEPT Emergency Medicine  As needed, If symptoms worsen [address] [facility]  [phone]          Instructed to return to ED if worse  Diagnosis     Clinical Impression:   1. Localized edema    2. Hornet sting, accidental or unintentional, initial encounter    3. Acute hypokalemia    4. Uncontrolled type 2 diabetes mellitus with hyperglycemia (HCC)      Attestation:  Sebastián Martinez MD, am the attending of record for this patient. I personally performed the services described in this documentation on this date, 7/17/2020 for patient, Caty Borden. I have reviewed the chart and verified that the record is accurate and complete. This note will not be viewable in [address]

## 2020-07-27 DIAGNOSIS — R73.03 PREDIABETES: ICD-10-CM

## 2020-07-27 DIAGNOSIS — G25.2 INTENTION TREMOR: ICD-10-CM

## 2020-07-27 DIAGNOSIS — Z72.0 TOBACCO ABUSE: ICD-10-CM

## 2020-07-27 DIAGNOSIS — I10 HTN, GOAL BELOW 130/80: ICD-10-CM

## 2020-07-28 RX ORDER — CHLORTHALIDONE 25 MG/1
TABLET ORAL
Qty: 90 TAB | Refills: 0 | Status: SHIPPED | OUTPATIENT
Start: 2020-07-28 | End: 2020-11-06

## 2020-08-26 ENCOUNTER — VIRTUAL VISIT (OUTPATIENT)
Dept: FAMILY MEDICINE CLINIC | Age: 55
End: 2020-08-26
Payer: COMMERCIAL

## 2020-08-26 DIAGNOSIS — E87.6 HYPOKALEMIA: ICD-10-CM

## 2020-08-26 DIAGNOSIS — Z79.4 TYPE 2 DIABETES MELLITUS WITH HYPERGLYCEMIA, WITH LONG-TERM CURRENT USE OF INSULIN (HCC): Primary | ICD-10-CM

## 2020-08-26 DIAGNOSIS — B35.1 ONYCHOMYCOSIS: ICD-10-CM

## 2020-08-26 DIAGNOSIS — I10 HTN, GOAL BELOW 140/90: ICD-10-CM

## 2020-08-26 DIAGNOSIS — E11.65 TYPE 2 DIABETES MELLITUS WITH HYPERGLYCEMIA, WITH LONG-TERM CURRENT USE OF INSULIN (HCC): Primary | ICD-10-CM

## 2020-08-26 DIAGNOSIS — E53.8 B12 DEFICIENCY DUE TO DIET: ICD-10-CM

## 2020-08-26 PROCEDURE — 99214 OFFICE O/P EST MOD 30 MIN: CPT | Performed by: FAMILY MEDICINE

## 2020-08-26 RX ORDER — POTASSIUM CHLORIDE 20 MEQ/1
20 TABLET, EXTENDED RELEASE ORAL DAILY
Qty: 30 TAB | Refills: 3 | Status: SHIPPED | OUTPATIENT
Start: 2020-08-26 | End: 2021-10-11 | Stop reason: ALTCHOICE

## 2020-08-26 RX ORDER — CICLOPIROX 80 MG/ML
1 SOLUTION TOPICAL
Qty: 1 BOTTLE | Refills: 5 | Status: SHIPPED | OUTPATIENT
Start: 2020-08-26 | End: 2021-10-11 | Stop reason: ALTCHOICE

## 2020-08-26 RX ORDER — EPINEPHRINE 0.3 MG/.3ML
0.3 INJECTION SUBCUTANEOUS ONCE
Status: SHIPPED | OUTPATIENT
Start: 2020-08-26 | End: 2020-08-26

## 2020-08-26 NOTE — PATIENT INSTRUCTIONS

## 2020-08-26 NOTE — PROGRESS NOTES
HISTORY OF PRESENT ILLNESS  Diego Alvarez is a 50 y.o. male. HPI     Today's Pt main concerns were provided on virtual visit and Video telemed format,  Present on VV for the concern of the current medical conditions,  pt is w/ comorbid history and unaware if the pt has been exposed to covid-19 individual,   Pt Have been staying at home for couple of wks,  pt has no fever no cough no dyspnea, no ha, not dizzy, nl smell nl taste, no N/V/D, no body ache. DMtype II wt of 265Patient also present for diabetic check,  the patient has been compliancy w/ meds, patient also states that the pt is trying to have a diabetic diet, and eat less of carbohydrates, since last visit patient has been more active with daily walking, patient also states that there is a home monitoring glucose device  usually averaging around 150-194 , +++ Rf needed for today. This time patient denies  tingling sensation, has no polyurea and polydipsia, last a1c was not at target of 6.4% %     Last urine microalbumin 2019 and was abnormal, patient currently on ACE inhibitor. Feeling better since the last visit. Current Outpatient Medications   Medication Sig Dispense Refill    chlorthalidone (HYGROTEN) 25 mg tablet TAKE 1 TABLET BY MOUTH EVERY DAY 90 Tab 0    hydrOXYchloroQUINE (PLAQUENIL) 200 mg tablet Take 2 Tabs by mouth daily. 60 Tab 6    metFORMIN (GLUCOPHAGE) 1,000 mg tablet TAKE ONE TABLET BY MOUTH TWICE A DAY WITH MEALS 60 Tab 0    diclofenac EC (VOLTAREN) 75 mg EC tablet TAKE ONE TABLET BY MOUTH TWICE A DAY 60 Tab 0    EnbreL SureClick 50 mg/mL (1 mL) injection 0.98 mL by SubCUTAneous route every seven (7) days. 4 Each 2    acetaminophen (TYLENOL) 650 mg TbER Take 1 Tab by mouth every eight (8) hours. 21 Tab 0    OTHER C-PAP MACHINE WHILE SLEEPING      turmeric 400 mg cap Take  by mouth daily.  fish oil-omega-3 fatty acids (FISH OIL) 340-1,000 mg capsule Take 1 Cap by mouth daily.       cholecalciferol (VITAMIN D3) 1,000 unit tablet Take  by mouth daily.  cyanocobalamin (VITAMIN B-12) 500 mcg tablet Take 500 mcg by mouth daily.  folic acid (FOLVITE) 1 mg tablet TAKE 1 TABLET BY MOUTH DAILY 1 Tab 3    methotrexate (RHEUMATREX) 2.5 mg tablet Take  by mouth every Sunday. Takes 4 Tablets Every Sunday      tacrolimus (PROTOPIC) 0.1 % ointment Apply  to affected area two (2) times a day. (Patient not taking: Reported on 8/26/2020) 30 g 6    aspirin delayed-release 81 mg tablet Take 1 Tab by mouth daily. (Patient not taking: Reported on 8/26/2020) 30 Tab 11     No Known Allergies  Past Medical History:   Diagnosis Date    Cellulitis and abscess of trunk 7/31/2014    Chronic back pain greater than 3 months duration 7/31/2014    [facility]     Family history of prostate cancer 7/31/2014    Hyperhydrosis disorder 7/31/2014    Hypertension     Intention tremor 12/9/2015    Prediabetes 6/25/2014    [facility] 7/[facility]/2014    Rash of perineum 6/25/2014    Tinea manus 1/19/2016    Tinea pedis of both feet 1/19/2016    Tobacco abuse 6/25/2014     Past Surgical History:   Procedure Laterality Date    HX ORTHOPAEDIC      right leg and knee     Family History   Problem Relation Age of Onset    Diabetes Mother     Hypertension Mother     Stroke Mother     Diabetes Father     Hypertension Father      Social History     Tobacco Use    Smoking status: Former Smoker     Packs/day: 1.00    Smokeless tobacco: Never Used   Substance Use Topics    Alcohol use: Yes     Comment: occasionally      Lab Results   Component Value Date/Time    WBC 7.9 07/17/2020 05:30 PM    HGB 13.2 07/17/2020 05:30 PM    HCT 38.5 07/17/2020 05:30 PM    PLATELET 626 11/18/4725 05:30 PM    MCV 80.2 07/17/2020 05:30 PM     Lab Results   Component Value Date/Time    ALT (SGPT) 26 07/17/2020 05:30 PM    Alk.  phosphatase 75 07/17/2020 05:30 PM    Bilirubin, total 0.4 07/17/2020 05:30 PM    Albumin 3.2 (L) 07/17/2020 05:30 PM Protein, total 7.1 07/17/2020 05:30 PM    PLATELET 001 17/07/5270 05:30 PM     Lab Results   Component Value Date/Time    GFR est non-AA >60 07/17/2020 05:30 PM    GFR est AA >60 07/17/2020 05:30 PM    Creatinine 0.90 07/17/2020 05:30 PM    BUN 9 07/17/2020 05:30 PM    Sodium 138 07/17/2020 05:30 PM    Potassium 2.9 (L) 07/17/2020 05:30 PM    Chloride 102 07/17/2020 05:30 PM    CO2 29 07/17/2020 05:30 PM        Review of Systems   Constitutional: Negative for chills, fever and malaise/fatigue. HENT: Negative for nosebleeds. Eyes: Negative for pain. Respiratory: Negative for cough and wheezing. Cardiovascular: Negative for chest pain and leg swelling. Gastrointestinal: Negative for constipation, diarrhea and nausea. Genitourinary: Negative for frequency. Musculoskeletal: Negative for joint pain and myalgias. Skin: Negative for rash. Neurological: Negative for loss of consciousness. Endo/Heme/Allergies: Does not bruise/bleed easily. Psychiatric/Behavioral: Negative for depression. The patient is not nervous/anxious and does not have insomnia. All other systems reviewed and are negative. Physical Exam  Constitutional:       Appearance: Normal appearance. HENT:      Head: Normocephalic and atraumatic. Nose: Nose normal. No congestion. Neurological:      Mental Status: He is alert and oriented to person, place, and time. Psychiatric:         Mood and Affect: Mood normal.         Behavior: Behavior normal.         Thought Content: Thought content normal.         Judgment: Judgment normal.         ASSESSMENT and PLAN  Diagnoses and all orders for this visit:    1. Type 2 diabetes mellitus with hyperglycemia, with long-term current use of insulin (HCC)  -     HEMOGLOBIN A1C WITH EAG  -     potassium chloride (K-DUR, KLOR-CON) 20 mEq tablet;  Take 1 Tab by mouth daily.  -     EPINEPHrine (EPIPEN) injection 0.3 mg  -     METABOLIC PANEL, COMPREHENSIVE  -     VITAMIN B12 & FOLATE    2. Onychomycosis  -     ciclopirox (PENLAC) 8 % solution; Apply 1 mL to affected area nightly. 3. HTN, goal below 140/90  -     potassium chloride (K-DUR, KLOR-CON) 20 mEq tablet; Take 1 Tab by mouth daily. 4. Hypokalemia  -     potassium chloride (K-DUR, KLOR-CON) 20 mEq tablet; Take 1 Tab by mouth daily.  -     METABOLIC PANEL, COMPREHENSIVE    5. B12 deficiency due to diet  -     potassium chloride (K-DUR, KLOR-CON) 20 mEq tablet;  Take 1 Tab by mouth daily.  -     METABOLIC PANEL, COMPREHENSIVE  -     VITAMIN B12 & FOLATE

## 2020-08-26 NOTE — PROGRESS NOTES
Chief Complaint   Patient presents with    Diabetes     follow up     1. Have you been to the ER, urgent care clinic since your last visit? Hospitalized since your last visit? Yes When: 07/2020 Where: [facility]  Reason for visit: bee sting    2. Have you seen or consulted any other health care providers outside of the [address] [place] [address] since your last visit? Include any pap smears or colon screening. No      Call placed to pt. Verified patient with two type of identifiers.       Blood sugars 90s-120s before breakfast   Blood sugar this am before breakfast  192

## 2020-08-26 NOTE — PROGRESS NOTES
Diabetic nail care appt scheduled for [email or web address]  Lab only appt scheduled for 8/27/2020,   Pt will call back with fax number for [employer].

## 2020-08-27 ENCOUNTER — APPOINTMENT (OUTPATIENT)
Dept: FAMILY MEDICINE CLINIC | Age: 55
End: 2020-08-27

## 2020-08-28 LAB
ALBUMIN SERPL-MCNC: 4.3 G/DL (ref 3.8–4.9)
ALBUMIN/GLOB SERPL: 1.5 {RATIO} (ref 1.2–2.2)
ALP SERPL-CCNC: 61 IU/L (ref 39–117)
ALT SERPL-CCNC: 17 IU/L (ref 0–44)
AST SERPL-CCNC: 16 IU/L (ref 0–40)
BILIRUB SERPL-MCNC: 0.4 MG/DL (ref 0–1.2)
BUN SERPL-MCNC: 11 MG/DL (ref 6–24)
BUN/CREAT SERPL: 12 (ref 9–20)
CALCIUM SERPL-MCNC: 9.4 MG/DL (ref 8.7–10.2)
CHLORIDE SERPL-SCNC: 99 MMOL/L (ref 96–106)
CO2 SERPL-SCNC: 26 MMOL/L (ref 20–29)
CREAT SERPL-MCNC: 0.89 MG/DL (ref 0.76–1.27)
EST. AVERAGE GLUCOSE BLD GHB EST-MCNC: 134 MG/DL
FOLATE SERPL-MCNC: 14.1 NG/ML
GLOBULIN SER CALC-MCNC: 2.9 G/DL (ref 1.5–4.5)
GLUCOSE SERPL-MCNC: 91 MG/DL (ref 65–99)
HBA1C MFR BLD: 6.3 % (ref 4.8–5.6)
POTASSIUM SERPL-SCNC: 3.7 MMOL/L (ref 3.5–5.2)
PROT SERPL-MCNC: 7.2 G/DL (ref 6–8.5)
SODIUM SERPL-SCNC: 138 MMOL/L (ref 134–144)
VIT B12 SERPL-MCNC: 591 PG/ML (ref 232–1245)

## 2020-09-04 ENCOUNTER — OFFICE VISIT (OUTPATIENT)
Dept: FAMILY MEDICINE CLINIC | Age: 55
End: 2020-09-04
Payer: COMMERCIAL

## 2020-09-04 VITALS
BODY MASS INDEX: 44.32 KG/M2 | TEMPERATURE: 96.4 F | DIASTOLIC BLOOD PRESSURE: 83 MMHG | RESPIRATION RATE: 18 BRPM | OXYGEN SATURATION: 98 % | WEIGHT: 282.4 LBS | HEART RATE: 93 BPM | SYSTOLIC BLOOD PRESSURE: 114 MMHG | HEIGHT: 67 IN

## 2020-09-04 DIAGNOSIS — B35.1 DERMATOPHYTOSIS OF NAIL: ICD-10-CM

## 2020-09-04 DIAGNOSIS — B35.1 ONYCHOMYCOSIS DUE TO DERMATOPHYTE: ICD-10-CM

## 2020-09-04 DIAGNOSIS — K43.9 ABDOMINAL WALL HERNIA: Primary | ICD-10-CM

## 2020-09-04 DIAGNOSIS — N50.89 TESTICLE SWELLING: ICD-10-CM

## 2020-09-04 DIAGNOSIS — E11.42 DIABETIC POLYNEUROPATHY ASSOCIATED WITH TYPE 2 DIABETES MELLITUS (HCC): ICD-10-CM

## 2020-09-04 PROCEDURE — 99214 OFFICE O/P EST MOD 30 MIN: CPT | Performed by: FAMILY MEDICINE

## 2020-09-04 PROCEDURE — 11730 AVULSION NAIL PLATE SIMPLE 1: CPT | Performed by: FAMILY MEDICINE

## 2020-09-04 PROCEDURE — 11732 AVLSN NAIL PLATE SIMPLE EACH: CPT | Performed by: FAMILY MEDICINE

## 2020-09-04 RX ORDER — NYSTATIN 100000 U/G
CREAM TOPICAL 2 TIMES DAILY
Qty: 30 G | Refills: 3 | Status: SHIPPED | OUTPATIENT
Start: 2020-09-04 | End: 2021-10-11 | Stop reason: ALTCHOICE

## 2020-09-04 NOTE — PROGRESS NOTES
Chief Complaint   Patient presents with    Diabetes     1. Have you been to the ER, urgent care clinic since your last visit? Hospitalized since your last visit? No    2. Have you seen or consulted any other health care providers outside of the [address] [place] [address] since your last visit? Include any pap smears or colon screening. Yes When: 08/2020 Where: Dr Vu(podiatrist) Reason for visit: ankle pain    Verified patient with two type of identifiers.   here today for diabetic foot care, requesting toe nails clipped    Declines flu vaccine

## 2020-09-04 NOTE — PATIENT INSTRUCTIONS
Diabetes Foot Health: Care Instructions  Your Care Instructions     When you have diabetes, your feet need extra care and attention. Diabetes can damage the nerve endings and blood vessels in your feet, making you less likely to notice when your feet are injured. Diabetes also limits your body's ability to fight infection and get blood to areas that need it. If you get a minor foot injury, it could become an ulcer or a serious infection. With good foot care, you can prevent most of these problems. Caring for your feet can be quick and easy. Most of the care can be done when you are bathing or getting ready for bed. Follow-up care is a key part of your treatment and safety. Be sure to make and go to all appointments, and call your doctor if you are having problems. It's also a good idea to know your test results and keep a list of the medicines you take. How can you care for yourself at home? · Keep your blood sugar close to normal by watching what and how much you eat, monitoring blood sugar, taking medicines if prescribed, and getting regular exercise. · Do not smoke. Smoking affects blood flow and can make foot problems worse. If you need help quitting, talk to your doctor about stop-smoking programs and medicines. These can increase your chances of quitting for good. · Eat a diet that is low in fats. High fat intake can cause fat to build up in your blood vessels and decrease blood flow. · Inspect your feet daily for blisters, cuts, cracks, or sores. If you cannot see well, use a mirror or have someone help you. · Take care of your feet:  ? Wash your feet every day. Use warm (not hot) water. Check the water temperature with your wrists or other part of your body, not your feet. ? Dry your feet well. Pat them dry. Do not rub the skin on your feet too hard. Dry well between your toes. If the skin on your feet stays moist, bacteria or a fungus can grow, which can lead to infection. ?  Keep your skin soft. Use moisturizing skin cream to keep the skin on your feet soft and prevent calluses and cracks. But do not put the cream between your toes, and stop using any cream that causes a rash. ? Clean underneath your toenails carefully. Do not use a sharp object to clean underneath your toenails. Use the blunt end of a nail file or other rounded tool. ? Trim and file your toenails straight across to prevent ingrown toenails. Use a nail clipper, not scissors. Use an emery board to smooth the edges. · Change socks daily. Socks without seams are best, because seams often rub the feet. You can find socks for people with diabetes from specialty catalogs. · Look inside your shoes every day for things like gravel or torn linings, which could cause blisters or sores. · Buy shoes that fit well:  ? Look for shoes that have plenty of space around the toes. This helps prevent bunions and blisters. ? Try on shoes while wearing the kind of socks you will usually wear with the shoes. ? Avoid plastic shoes. They may rub your feet and cause blisters. Good shoes should be made of materials that are flexible and breathable, such as leather or cloth. ? Break in new shoes slowly by wearing them for no more than an hour a day for several days. Take extra time to check your feet for red areas, blisters, or other problems after you wear new shoes. · Do not go barefoot. Do not wear sandals, and do not wear shoes with very thin soles. Thin soles are easy to puncture. They also do not protect your feet from hot pavement or cold weather. · Have your doctor check your feet during each visit. If you have a foot problem, see your doctor. Do not try to treat an early foot problem at home. Home remedies or treatments that you can buy without a prescription (such as corn removers) can be harmful. · Always get early treatment for foot problems. A minor irritation can lead to a major problem if not properly cared for early.   When should you call for help? Call your doctor now or seek immediate medical care if:    · You have a foot sore, an ulcer or break in the skin that is not healing after 4 days, bleeding corns or calluses, or an ingrown toenail.     · You have blue or black areas, which can mean bruising or blood flow problems.     · You have peeling skin or tiny blisters between your toes or cracking or oozing of the skin.     · You have a fever for more than 24 hours and a foot sore.     · You have new numbness or tingling in your feet that does not go away after you move your feet or change positions.     · You have unexplained or unusual swelling of the foot or ankle. Watch closely for changes in your health, and be sure to contact your doctor if:    · You cannot do proper foot care. Where can you learn more? Go to http://jada-moreno.info/  Enter A739 in the search box to learn more about \"Diabetes Foot Health: Care Instructions. \"  Current as of: December 20, 2019               Content Version: 12.6  © 7043-0408 Healthwise, [employer]. Care instructions adapted under license by [employer] (which disclaims liability or warranty for this information). If you have questions about a medical condition or this instruction, always ask your healthcare professional. [address] any warranty or liability for your use of this information.

## 2020-09-22 RX ORDER — DICLOFENAC SODIUM 75 MG/1
TABLET, DELAYED RELEASE ORAL
Qty: 60 TAB | Refills: 0 | Status: SHIPPED | OUTPATIENT
Start: 2020-09-22 | End: 2020-11-10 | Stop reason: SDUPTHER

## 2020-09-22 NOTE — PROGRESS NOTES
HISTORY OF PRESENT ILLNESS  Carmella Evans is a 50 y.o. male. HPI   Today's Pt main concerns were provided in the clinic,    pt is w/ comorbid history and unaware if the pt has been exposed to covid-19 individual, Pt Have been working for couple of wks,  pt has no fever no cough no dyspnea, no ha, not dizzy, nl smell nl taste, no N/V/D, no body ache. Testicular enlargement  The history is provided by the patient. This is a chronic problem. The current episode started more than few months ago. no burning sensation, no pain, abnl flow, ++ dribbling, +nocturia, No fhx of prostate, not seen the urologist, recently had done US which showed hydrocele, . Patient presented with elongated curved toenails bilaterally x10 hyperkeratotic painful foot 7 out of 10 with limps elongated nails are approximately 1 inches long curved under need the toe inferiorly Summar elongated superiorly poking out of the shoes and some inserted into the skin patient's daughter has been wondering about the mother conditions,   Under sterile condition procedures tolerated patient felt much better toenail nail folds elongated nails hyperkeratotic were removed and debridement was performed no bleeding no complication patient happy with the progress    Patient present with a complaint of abdominal lump  state that the swelling fluctuates sometimes is gone sometimes it comes back sometimes is painful other time the patient does not notice it patient currently does work out with strenuous activity and is sexually active,  the patient like to get it fixed if it is possible otherwise denies any other complaint,    Current Outpatient Medications   Medication Sig Dispense Refill    nystatin (MYCOSTATIN) topical cream Apply  to affected area two (2) times a day. 30 g 3    potassium chloride (K-DUR, KLOR-CON) 20 mEq tablet Take 1 Tab by mouth daily.  30 Tab 3    chlorthalidone (HYGROTEN) 25 mg tablet TAKE 1 TABLET BY MOUTH EVERY DAY 90 Tab 0  hydrOXYchloroQUINE (PLAQUENIL) 200 mg tablet Take 2 Tabs by mouth daily. 60 Tab 6    metFORMIN (GLUCOPHAGE) 1,000 mg tablet TAKE ONE TABLET BY MOUTH TWICE A DAY WITH MEALS 60 Tab 0    EnbreL SureClick 50 mg/mL (1 mL) injection 0.98 mL by SubCUTAneous route every seven (7) days. 4 Each 2    acetaminophen (TYLENOL) 650 mg TbER Take 1 Tab by mouth every eight (8) hours. 21 Tab 0    ciclopirox (PENLAC) 8 % solution Apply 1 mL to affected area nightly. (Patient not taking: Reported on 9/4/2020) 1 Bottle 5    diclofenac EC (VOLTAREN) 75 mg EC tablet TAKE ONE TABLET BY MOUTH TWICE A DAY (Patient not taking: Reported on 9/4/2020) 60 Tab 0    OTHER C-PAP MACHINE WHILE SLEEPING      turmeric 400 mg cap Take  by mouth daily.  fish oil-omega-3 fatty acids (FISH OIL) 340-1,000 mg capsule Take 1 Cap by mouth daily.  cholecalciferol (VITAMIN D3) 1,000 unit tablet Take  by mouth daily.  cyanocobalamin (VITAMIN B-12) 500 mcg tablet Take 500 mcg by mouth daily.  folic acid (FOLVITE) 1 mg tablet TAKE 1 TABLET BY MOUTH DAILY (Patient not taking: Reported on 9/4/2020) 1 Tab 3    methotrexate (RHEUMATREX) 2.5 mg tablet Take  by mouth every Sunday. Takes 4 Tablets Every Sunday      tacrolimus (PROTOPIC) 0.1 % ointment Apply  to affected area two (2) times a day. (Patient not taking: Reported on 8/26/2020) 30 g 6    aspirin delayed-release 81 mg tablet Take 1 Tab by mouth daily.  (Patient not taking: Reported on 8/26/2020) 30 Tab 11     No Known Allergies  Past Medical History:   Diagnosis Date    Cellulitis and abscess of trunk 7/31/2014    Chronic back pain greater than 3 months duration 7/31/2014    Diabetes ([address]     Family history of prostate cancer 7/31/2014    Hyperhydrosis disorder 7/31/2014    Hypertension     Intention tremor 12/9/2015    Prediabetes 6/25/2014    RA (rheumatoid arthritis) ([address] 7/31/2014    Rash of perineum 6/25/2014    Tinea manus 1/19/2016    Tinea pedis of both feet 1/19/2016    Tobacco abuse 6/25/2014     Past Surgical History:   Procedure Laterality Date    HX ORTHOPAEDIC      right leg and knee     Family History   Problem Relation Age of Onset    Diabetes Mother     Hypertension Mother     Stroke Mother     Diabetes Father     Hypertension Father      Social History     Tobacco Use    Smoking status: Former Smoker     Packs/day: 1.00    Smokeless tobacco: Never Used   Substance Use Topics    Alcohol use: Yes     Comment: occasionally      Lab Results   Component Value Date/Time    WBC 7.9 07/17/2020 05:30 PM    HGB 13.2 07/17/2020 05:30 PM    HCT 38.5 07/17/2020 05:30 PM    PLATELET 714 19/04/5869 05:30 PM    MCV 80.2 07/17/2020 05:30 PM     Lab Results   Component Value Date/Time    TSH 1.430 07/17/2019 03:38 PM         Review of Systems   Constitutional: Negative for chills, fever and malaise/fatigue. HENT: Negative for nosebleeds. Eyes: Negative for pain. Respiratory: Negative for cough and wheezing. Cardiovascular: Negative for chest pain and leg swelling. Gastrointestinal: Negative for constipation, diarrhea and nausea. Genitourinary: Negative for frequency. Musculoskeletal: Negative for joint pain and myalgias. Skin: Negative for rash. Neurological: Negative for loss of consciousness and headaches. Endo/Heme/Allergies: Does not bruise/bleed easily. Psychiatric/Behavioral: Negative for depression. The patient is not nervous/anxious and does not have insomnia. All other systems reviewed and are negative. Physical Exam  Vitals signs and nursing note reviewed. Constitutional:       Appearance: He is well-developed. HENT:      Head: Normocephalic and atraumatic. Mouth/Throat:      Pharynx: No oropharyngeal exudate. Eyes:      Conjunctiva/sclera: Conjunctivae normal.      Pupils: Pupils are equal, round, and reactive to light. Neck:      Musculoskeletal: Normal range of motion and neck supple.       Thyroid: No thyromegaly. Vascular: No JVD. Cardiovascular:      Rate and Rhythm: Normal rate and regular rhythm. Heart sounds: Normal heart sounds. No murmur. No friction rub. Pulmonary:      Effort: Pulmonary effort is normal. No respiratory distress. Breath sounds: Normal breath sounds. No wheezing or rales. Abdominal:      General: Bowel sounds are normal. There is no distension. Palpations: Abdomen is soft. Tenderness: There is no abdominal tenderness. Musculoskeletal:         General: No tenderness. Lymphadenopathy:      Cervical: No cervical adenopathy. Skin:     General: Skin is warm. Coloration: Skin is pale. Findings: Erythema, lesion and rash present. Neurological:      Mental Status: He is alert and oriented to person, place, and time. Deep Tendon Reflexes: Reflexes are normal and symmetric. Psychiatric:         Behavior: Behavior normal.         ASSESSMENT and PLAN  Diagnoses and all orders for this visit:    1. Abdominal wall hernia  -     REFERRAL TO GENERAL SURGERY    2. Onychomycosis due to dermatophyte  -     nystatin (MYCOSTATIN) topical cream; Apply  to affected area two (2) times a day. 3. Testicle swelling  -     US SCROTUM/TESTICLES; Future    4. Diabetic polyneuropathy associated with type 2 diabetes mellitus (HCC)  -     OR REMOVAL OF NAIL PLATE  -     OR REMOVE ADDITIONAL NAIL PLATE    5.  Dermatophytosis of nail  -     OR REMOVAL OF NAIL PLATE  -     OR REMOVE ADDITIONAL NAIL PLATE            patient advised to decrease heavy lifting heavy pushing and avoid constipation with stool softner such as colace 100 mg one tab tid w/ meals, metamucil powder one cup twice daily, avoid fatty, fast and fried foods, donot miss meals, small meals and more frequent avoid late dinner avoid overeating, increase po fluid intake daily, compliance advised RTC if worsen      Concern reza HALLID-19 addressed and detailed, pt was told that the best way to prevent illness is by protection, to Wear a facemask , having social distance, and to get tested if possible,   Pt was also told if develop dyspnea needs to call 911 or go to er, call for kinsey advise, pt agreed with todays recommendations,

## 2020-10-23 ENCOUNTER — TELEPHONE (OUTPATIENT)
Dept: FAMILY MEDICINE CLINIC | Age: 55
End: 2020-10-23

## 2020-10-23 NOTE — TELEPHONE ENCOUNTER
----- Message from Alfonzo Tomlinson sent at 10/23/2020  3:43 PM EDT -----  Regarding: Dr. Phyllis Schilling telephone  General Message/Vendor Calls    Caller's first and last name: [address] [place] [address] w/ [address] [place] [address]        Reason for call: pt prefers to be seen for hernia at [facility] yes/no and why: yes, [facility]'s surgical practice       Best contact number(s): [phone]      Details to clarify the request: message in work que from [employer]

## 2020-11-06 DIAGNOSIS — I10 HTN, GOAL BELOW 130/80: ICD-10-CM

## 2020-11-06 DIAGNOSIS — G25.2 INTENTION TREMOR: ICD-10-CM

## 2020-11-06 DIAGNOSIS — E11.65 TYPE 2 DIABETES MELLITUS WITH HYPERGLYCEMIA, WITH LONG-TERM CURRENT USE OF INSULIN (HCC): ICD-10-CM

## 2020-11-06 DIAGNOSIS — R73.03 PREDIABETES: ICD-10-CM

## 2020-11-06 DIAGNOSIS — Z72.0 TOBACCO ABUSE: ICD-10-CM

## 2020-11-06 DIAGNOSIS — Z79.4 TYPE 2 DIABETES MELLITUS WITH HYPERGLYCEMIA, WITH LONG-TERM CURRENT USE OF INSULIN (HCC): ICD-10-CM

## 2020-11-06 RX ORDER — CHLORTHALIDONE 25 MG/1
TABLET ORAL
Qty: 90 TAB | Refills: 0 | Status: SHIPPED | OUTPATIENT
Start: 2020-11-06 | End: 2021-03-15

## 2020-11-06 RX ORDER — METFORMIN HYDROCHLORIDE 1000 MG/1
TABLET ORAL
Qty: 60 TAB | Refills: 0 | Status: SHIPPED | OUTPATIENT
Start: 2020-11-06 | End: 2020-11-10 | Stop reason: SDUPTHER

## 2020-11-10 DIAGNOSIS — Z79.4 TYPE 2 DIABETES MELLITUS WITH HYPERGLYCEMIA, WITH LONG-TERM CURRENT USE OF INSULIN (HCC): ICD-10-CM

## 2020-11-10 DIAGNOSIS — E11.65 TYPE 2 DIABETES MELLITUS WITH HYPERGLYCEMIA, WITH LONG-TERM CURRENT USE OF INSULIN (HCC): ICD-10-CM

## 2020-11-10 NOTE — TELEPHONE ENCOUNTER
----- Message from Janna Tinsley sent at 11/10/2020 10:10 AM EST -----  Regarding: Dr. Esvin Ibanez refill  Medication Refill    Caller (if not patient): N/A      Relationship of caller (if not patient): N/A      Best contact number(s):[phone]      Name of medication and dosage if known: Metformin 1000mg / \"Diclofenac\" 75mg      Is patient out of this medication (yes/no): yes for last 4 days      Pharmacy name: [address] [place] [address] listed in chart? (yes/no):yes  Pharmacy phone number: [phone]      Details to clarify the request: Pt is requesting an authorization for Rx refills for  Metformin 1000mg and \"Diclofenac\" 75mg be called into the pharmacy on file. Pt advised that he received a text from the pharmacy that the refill was denied. Pt advised he is feeling much pain without medication.       Janna Tinsley

## 2020-11-11 RX ORDER — DICLOFENAC SODIUM 75 MG/1
TABLET, DELAYED RELEASE ORAL
Qty: 60 TAB | Refills: 0 | Status: SHIPPED | OUTPATIENT
Start: 2020-11-11 | End: 2020-12-21

## 2020-11-11 RX ORDER — METFORMIN HYDROCHLORIDE 1000 MG/1
TABLET ORAL
Qty: 60 TAB | Refills: 0 | Status: SHIPPED | OUTPATIENT
Start: 2020-11-11 | End: 2021-05-28

## 2020-11-17 ENCOUNTER — OFFICE VISIT (OUTPATIENT)
Dept: SURGERY | Age: 55
End: 2020-11-17
Payer: COMMERCIAL

## 2020-11-17 VITALS
TEMPERATURE: 97.9 F | HEIGHT: 67 IN | DIASTOLIC BLOOD PRESSURE: 76 MMHG | HEART RATE: 84 BPM | RESPIRATION RATE: 18 BRPM | SYSTOLIC BLOOD PRESSURE: 123 MMHG | OXYGEN SATURATION: 97 % | BODY MASS INDEX: 45.45 KG/M2 | WEIGHT: 289.6 LBS

## 2020-11-17 DIAGNOSIS — M62.08 DIASTASIS RECTI: Primary | ICD-10-CM

## 2020-11-17 PROCEDURE — 99242 OFF/OP CONSLTJ NEW/EST SF 20: CPT | Performed by: SURGERY

## 2020-11-17 NOTE — Clinical Note
12/8/20 Patient: Dino Sandoval YOB: 1965 Date of Visit: 11/17/2020 Maria E Phoenix MD 
[address] [place] [id number] VIA In Basket Dear Maria E Phoenix MD, Thank you for referring Mr. Shannan Huerta to [address] [place] [address] for evaluation. My notes for this consultation are attached. If you have questions, please do not hesitate to call me. I look forward to following your patient along with you.  
 
 
Sincerely, 
 
Alanis Epps MD

## 2020-11-17 NOTE — PROGRESS NOTES
Chief Complaint   Patient presents with    Possible Hernia     seen at the request of Dr. Svitlana Sharpe, eval abdominal wall hernia.

## 2020-11-23 ENCOUNTER — OFFICE VISIT (OUTPATIENT)
Dept: RHEUMATOLOGY | Age: 55
End: 2020-11-23
Payer: COMMERCIAL

## 2020-11-23 VITALS
WEIGHT: 285.6 LBS | SYSTOLIC BLOOD PRESSURE: 130 MMHG | TEMPERATURE: 98.6 F | OXYGEN SATURATION: 93 % | HEART RATE: 90 BPM | DIASTOLIC BLOOD PRESSURE: 89 MMHG | RESPIRATION RATE: 16 BRPM | BODY MASS INDEX: 44.73 KG/M2

## 2020-11-23 DIAGNOSIS — M05.741 RHEUMATOID ARTHRITIS INVOLVING BOTH HANDS WITH POSITIVE RHEUMATOID FACTOR (HCC): Primary | ICD-10-CM

## 2020-11-23 DIAGNOSIS — M05.742 RHEUMATOID ARTHRITIS INVOLVING BOTH HANDS WITH POSITIVE RHEUMATOID FACTOR (HCC): Primary | ICD-10-CM

## 2020-11-23 PROCEDURE — 99214 OFFICE O/P EST MOD 30 MIN: CPT | Performed by: PEDIATRICS

## 2020-11-23 RX ORDER — PREDNISONE 5 MG/1
TABLET ORAL
Qty: 30 TAB | Refills: 1 | OUTPATIENT
Start: 2020-11-23 | End: 2021-07-19

## 2020-11-23 RX ORDER — ETANERCEPT 50 MG/ML
50 SOLUTION SUBCUTANEOUS
Qty: 2 EACH | Refills: 0 | Status: SHIPPED | COMMUNITY
Start: 2020-11-23 | End: 2020-12-15 | Stop reason: SDUPTHER

## 2020-11-23 NOTE — PROGRESS NOTES
RHEUMATOLOGY PROBLEM LIST AND CHIEF COMPLAINT  1. Rheumatoid Arthritis (2015) - Joint pain/swelling, synovitis in hands, wrists, elbows, rheumatoid nodules on elbows, morning stiffness. Elevated ESR, CCP, RF, CRP. Therapy History:  Previous DMARDs: MTX (Past-09/2016, 10/2016 - 12/2017; AEs- nodulosis)   Current DMARDs: Enbrel (11/2015-current), Plaquenil (2015 - 2017, 3/2018-current)    2. Shoulder OA and rotator cuff disease    INTERVAL HISTORY  This is a 50 y.o.  male. Today, the patient complains of pain in the joints. Location: knee  Severity:  5 on a scale of 0-10  Timing: all day  Duration:  8 months  Context/Associated signs and symptoms: The patient's chief complaint today is significant pain over his left ankle with decreased range of motion. He states he underwent MRI showing osteoarthritis - records unavailable. He also complains of moderate bilateral knee pain and continued nodulosis. He notes previous steroid injections into his knees provided relief. He continues on Enbrel 50 mg weekly and Plaquenil 400 mg daily. However, he notes that he ran out of Enbrel about 2-3 weeks ago. Of note, he has re-obtained insurance coverage.        RHEUMATOLOGY REVIEW OF SYSTEMS   Positives as per history  Negatives as follows:  Roseanna Medal:  Denies unexplained persistent fevers, weight change, chronic fatigue  HEAD/EYES:   Denies eye redness, blurry vision or sudden loss of vision, dry eyes, HA, temporal artery pain  ENT:    Denies oral/nasal ulcers, recurrent sinus infections, dry mouth  RESPIRATORY:  No pleuritic pain, history of pleural effusions, hemoptysis, exertional dyspnea  CARDIOVASCULAR:  Denies chest pain, history of pericardial effusions  GASTRO:   Denies heartburn, esophageal dysmotility, abdominal pain, nausea, vomiting, diarrhea, blood in the stool  HEMATOLOGIC:  No easy bruising, purpura, swollen lymph nodes  SKIN:    Denies alopecia, ulcers, nodules, sun sensitivity, unexplained persistent rash   VASCULAR:   Denies edema, cyanosis, raynaud phenomenon  NEUROLOGIC:  Denies specific muscle weakness, paresthesias   PSYCHIATRIC:  No sleep disturbance / snoring, depression, anxiety  MSK:    No morning stiffness >1 hour, SI joint pain, persistent joint swelling    PAST MEDICAL HISTORY  Reviewed with patient, significant changes in medical history - no    PHYSICAL EXAM  Blood pressure 130/89, pulse 90, temperature 98.6 °F (37 °C), temperature source Temporal, resp. rate 16, weight 285 lb 9.6 oz (129.5 kg), SpO2 93 %. GENERAL APPEARANCE: Well-nourished, no acute distress  NECK: No adenopathy  ENT: No oral ulcers  CARDIOVASCULAR: Heart rhythm is regular. No murmur, rub, gallop  CHEST: Normal vesicular breath sounds. No wheezes, rales, pleural friction rubs  ABDOMINAL: The abdomen is soft and nontender. Bowel sounds are normal  SKIN: No rash, palpable purpura, digital ulcer, abnormal thickening   MUSCULOSKELETAL: Rotator cuff signs on the left - unchanged, Antalgic gait  Upper extremities -  2 Nodules noted over left elbow - unchanged. No synovitis. Minimal synovial thickening in the right 3rd digit, MCP & PIP, no synovitis. Decreased ROM of left shoulder. Lower extremities - B/L Knee crepitus, bony prominence (R>L), scar over right knee from previous surgery. Left ankle pain with inversion and eversion with no synovitis     LABS, RADIOLOGY AND PROCEDURES   Previous labs reviewed -Yes  Last TB 12/2017    ASSESSMENT  1. Rheumatoid Arthritis - I suspect the patient's complaints are most likely related to osteoarthritis. He should continue on Enbrel 50 mg weekly and Plaquenil 400 mg daily. I will provide Enbrel samples today as we await medication re-approval. I will order a TB test today to allow for Enbrel re-approval. Advised patient to have [facility] forward MRI results to our office. Patient is also able to start Prednisone 20 mg daily and taper by 5 mg q3 days for pain relief.  He should follow up in 4 months. 2. Drug therapy monitoring for toxicity (methotrexate/Enbrel) - CBC, BUN, Cr, AST, ALT and albumin every 4 months  3. Osteoarthritis - His complaints today are from OA mostly. Patient should have [facility] forward MRI records to our office for evaluation. He should continue with diclofenac PRN and weight loss. PLAN  1. Enbrel 50 mg weekly; 2 samples given today  2. Plaquenil 400 mg daily  3. Prednisone 20 mg daily; taper by 5 mg q3 days   4. Check CBC, CMP, and quantiferon-TB gold   5. Obtain recent MRI results  6. Return in 4 months    Refugio Louis MD  Adult and Pediatric Rheumatology     [facility], [address] [place] [address], Phone [phone], Fax [phone]     Visiting  of Pediatrics    Department of Pediatrics, [facility] of [address] [place] [address], [address] [place] [address], Phone [phone], Fax [phone]    There are no Patient Instructions on file for this visit.     cc:  Ghazal Leone MD    Written by jeffery Yu, as dictated by Dr. Meme Cloud M.D.

## 2020-11-23 NOTE — PROGRESS NOTES
Chief Complaint   Patient presents with    Joint Pain     1. Have you been to the ER, urgent care clinic since your last visit? Hospitalized since your last visit? No    2. Have you seen or consulted any other health care providers outside of the [address] [place] [address] since your last visit? Include any pap smears or colon screening.  No

## 2020-11-27 LAB
ALBUMIN SERPL-MCNC: 4.3 G/DL (ref 3.8–4.9)
ALBUMIN/GLOB SERPL: 1.3 {RATIO} (ref 1.2–2.2)
ALP SERPL-CCNC: 82 IU/L (ref 39–117)
ALT SERPL-CCNC: 21 IU/L (ref 0–44)
AST SERPL-CCNC: 17 IU/L (ref 0–40)
BASOPHILS # BLD MANUAL: 0.1 X10E3/UL (ref 0–0.2)
BASOPHILS NFR BLD MANUAL: 1 %
BILIRUB SERPL-MCNC: 0.6 MG/DL (ref 0–1.2)
BUN SERPL-MCNC: 15 MG/DL (ref 6–24)
BUN/CREAT SERPL: 15 (ref 9–20)
CALCIUM SERPL-MCNC: 9.8 MG/DL (ref 8.7–10.2)
CHLORIDE SERPL-SCNC: 95 MMOL/L (ref 96–106)
CO2 SERPL-SCNC: 23 MMOL/L (ref 20–29)
CREAT SERPL-MCNC: 1.03 MG/DL (ref 0.76–1.27)
DIFFERENTIAL COMMENT, 115260: NORMAL
EOSINOPHIL # BLD MANUAL: 0.3 X10E3/UL (ref 0–0.4)
EOSINOPHIL NFR BLD MANUAL: 3 %
ERYTHROCYTE [DISTWIDTH] IN BLOOD BY AUTOMATED COUNT: 14.1 % (ref 11.6–15.4)
GAMMA INTERFERON BACKGROUND BLD IA-ACNC: 0.04 IU/ML
GLOBULIN SER CALC-MCNC: 3.2 G/DL (ref 1.5–4.5)
GLUCOSE SERPL-MCNC: 138 MG/DL (ref 65–99)
HCT VFR BLD AUTO: 43.2 % (ref 37.5–51)
HGB BLD-MCNC: 14.8 G/DL (ref 13–17.7)
LYMPHOCYTES # BLD MANUAL: 3 X10E3/UL (ref 0.7–3.1)
LYMPHOCYTES NFR BLD MANUAL: 30 %
M TB IFN-G BLD-IMP: NEGATIVE
M TB IFN-G CD4+ BCKGRND COR BLD-ACNC: 0.04 IU/ML
MCH RBC QN AUTO: 27.2 PG (ref 26.6–33)
MCHC RBC AUTO-ENTMCNC: 34.3 G/DL (ref 31.5–35.7)
MCV RBC AUTO: 79 FL (ref 79–97)
MITOGEN IGNF BLD-ACNC: 5.93 IU/ML
MONOCYTES # BLD MANUAL: 0.8 X10E3/UL (ref 0.1–0.9)
MONOCYTES NFR BLD MANUAL: 8 %
NEUTROPHILS # BLD MANUAL: 5.9 X10E3/UL (ref 1.4–7)
NEUTROPHILS NFR BLD MANUAL: 58 %
PLATELET # BLD AUTO: 361 X10E3/UL (ref 150–450)
PLATELET BLD QL SMEAR: ADEQUATE
POTASSIUM SERPL-SCNC: 3.7 MMOL/L (ref 3.5–5.2)
PROT SERPL-MCNC: 7.5 G/DL (ref 6–8.5)
QUANTIFERON INCUBATION, QF1T: NORMAL
QUANTIFERON TB2 AG: 0.04 IU/ML
RBC # BLD AUTO: 5.44 X10E6/UL (ref 4.14–5.8)
RBC MORPH BLD: NORMAL
SERVICE CMNT-IMP: NORMAL
SODIUM SERPL-SCNC: 136 MMOL/L (ref 134–144)
WBC # BLD AUTO: 10.1 X10E3/UL (ref 3.4–10.8)

## 2020-12-08 NOTE — PROGRESS NOTES
To: Amy Mckeon MD    From: Abhishek Mendoza MD    Thank you for sending Nereida Damon to see us. Please note that this dictation was completed with [employer], the computer voice recognition software. Quite often unanticipated grammatical, syntax, homophones, and other interpretive errors are inadvertently transcribed by the software. Please disregard these errors. Please excuse any errors that have escaped final proofreading. Encounter Date: 11/17/2020  History and Physical    Assessment:   Diastasis recti. No ventral hernia. Body mass index is 45.36 kg/m². Plan:   He was relieved to hear this. There is nothing to do surgically. We discussed that weight loss would be beneficial.      Explained that with diastasis recti the distance between the right and left rectus abdominis muscles is created by the stretching of the linea alba connecting the two muscles. While the rectus sheath contains 3 layers (anterior sheath, rectus muscle, posterior sheath), the linea alba is a single thin layer of aponeurosis and therefore the most pliable area of the abdominal wall. With increased intraabdominal pressure, as occurs with exercise or valsalva, this more pliable area bulges. This condition has no associated morbidity or mortality. Very rarely patient can go on to develop hernias through the attenuated linea alba. Knows to call for any \"daughter\" bulge. Encouraged to continue exercise and activities without limitations. HPI:   Kris Corral is a 50 y.o. male who is seen in consultation at the request of Amy Mckeon MD for possible ventral hernia. He says he is not exactly sure how long he is has noticed this but he sees a big bulge in his upper abdomen when he gets up from bed. He has not had any pain. There has not been any effect on his appetite. There is no nausea or vomiting. His bowel movements are normal.    He has hypertension and diabetes.   He has no prior abdominal operations. Past Medical History:   Diagnosis Date    Cellulitis and abscess of trunk 7/31/2014    Chronic back pain greater than 3 months duration 7/31/2014    Diabetes ([facility]     Family history of prostate cancer 7/31/2014    Hyperhydrosis disorder 7/31/2014    Hypertension     Intention tremor 12/9/2015    Prediabetes 6/25/2014    RA (rheumatoid arthritis) ([facility] 7/31/2014    Rash of perineum 6/25/2014    Tinea manus 1/19/2016    Tinea pedis of both feet 1/19/2016    Tobacco abuse 6/25/2014     Past Surgical History:   Procedure Laterality Date    HX ORTHOPAEDIC      right leg and knee      Family History   Problem Relation Age of Onset    Diabetes Mother     Hypertension Mother     Stroke Mother     Diabetes Father     Hypertension Father       Social History     Tobacco Use    Smoking status: Former Smoker     Packs/day: 1.00    Smokeless tobacco: Never Used   Substance Use Topics    Alcohol use: Yes     Comment: occasionally      Current Outpatient Medications   Medication Sig    diclofenac EC (VOLTAREN) 75 mg EC tablet TAKE ONE TABLET BY MOUTH TWICE A DAY    metFORMIN (GLUCOPHAGE) 1,000 mg tablet TAKE ONE TABLET BY MOUTH TWICE A DAY WITH MEALS    chlorthalidone (HYGROTON) 25 mg tablet TAKE 1 TABLET BY MOUTH EVERY DAY    potassium chloride (K-DUR, KLOR-CON) 20 mEq tablet Take 1 Tab by mouth daily.  hydrOXYchloroQUINE (PLAQUENIL) 200 mg tablet Take 2 Tabs by mouth daily.  EnbreL SureClick 50 mg/mL (1 mL) injection 0.98 mL by SubCUTAneous route every seven (7) days.  OTHER C-PAP MACHINE WHILE SLEEPING    turmeric 400 mg cap Take  by mouth daily.  fish oil-omega-3 fatty acids (FISH OIL) 340-1,000 mg capsule Take 1 Cap by mouth daily.  cholecalciferol (VITAMIN D3) 1,000 unit tablet Take  by mouth daily.  tacrolimus (PROTOPIC) 0.1 % ointment Apply  to affected area two (2) times a day.     predniSONE (DELTASONE) 5 mg tablet 20mg x 3 days, 15mg x 3 days, 10mg x 3 days, 5mg x 3 days    EnbreL SureClick 50 mg/mL (1 mL) injection 0.98 mL by SubCUTAneous route every seven (7) days for 30 days.  nystatin (MYCOSTATIN) topical cream Apply  to affected area two (2) times a day.  ciclopirox (PENLAC) 8 % solution Apply 1 mL to affected area nightly.  cyanocobalamin (VITAMIN B-12) 500 mcg tablet Take 500 mcg by mouth daily.  folic acid (FOLVITE) 1 mg tablet TAKE 1 TABLET BY MOUTH DAILY (Patient not taking: Reported on 9/4/2020)    methotrexate (RHEUMATREX) 2.5 mg tablet Take  by mouth every Sunday. Takes 4 Tablets Every Sunday    aspirin delayed-release 81 mg tablet Take 1 Tab by mouth daily. (Patient not taking: Reported on 8/26/2020)     No current facility-administered medications for this visit. Allergies:  No Known Allergies     Review of Systems:  10 systems reviewed. See scanned sheet in \"Media\" section. See HPI for pertinent positives and negatives. Objective:     Visit Vitals  /76 (BP 1 Location: Left arm, BP Patient Position: Sitting)   Pulse 84   Temp 97.9 °F (36.6 °C) (Oral)   Resp 18   Ht 5' 7\" (1.702 m)   Wt 131.4 kg (289 lb 9.6 oz)   SpO2 97%   BMI 45.36 kg/m²       Physical Exam:  General appearance  Alert, cooperative, no distress, appears stated age   HEENT Anicteric   Neck Supple       Lungs   Clear to auscultation bilaterally   Heart  Regular rate and rhythm. Abdomen   Soft. Bowel sounds normal.  Obese. Diastases recti without hernia.    Extremities no cyanosis or edema   Pulses 2+ right radial   Skin Skin color, texture, turgor normal.   Lymph nodes Inguinal nodes normal.   Neurologic Without overt sensory or motor deficit     Signed By: Anu Jones MD     December 8, 2020

## 2020-12-15 ENCOUNTER — TELEPHONE (OUTPATIENT)
Dept: RHEUMATOLOGY | Age: 55
End: 2020-12-15

## 2020-12-15 ENCOUNTER — TRANSCRIBE ORDER (OUTPATIENT)
Dept: INTERNAL MEDICINE CLINIC | Age: 55
End: 2020-12-15

## 2020-12-15 RX ORDER — ETANERCEPT 50 MG/ML
50 SOLUTION SUBCUTANEOUS
Qty: 4 EACH | Refills: 3 | Status: SHIPPED | OUTPATIENT
Start: 2020-12-15 | End: 2021-01-14

## 2020-12-15 NOTE — TELEPHONE ENCOUNTER
Spoke to pt informed pt that Dr Shira Steve sent the Enbrel script to the [facility] pharmacy on file on 11/23/20, pt stated he will give [facility] a call to find out the status of the medication. Pt was informed if anything is needed from our office to give us a return call and we will send over the information needed to process the script.  Pt verbally acknowledged understanding

## 2020-12-15 NOTE — TELEPHONE ENCOUNTER
----- Message from Teofilo Links sent at 12/15/2020  8:28 AM EST -----  Regarding: Dr Vu/telephone  Pt is calling to let the doctor know he has not gotten his Embriel Injections medicine, has not had it in 3 to 4 weeks now, please call pt at [phone]

## 2020-12-21 RX ORDER — DICLOFENAC SODIUM 75 MG/1
TABLET, DELAYED RELEASE ORAL
Qty: 60 TAB | Refills: 0 | Status: SHIPPED | OUTPATIENT
Start: 2020-12-21 | End: 2021-02-03

## 2021-02-03 RX ORDER — DICLOFENAC SODIUM 75 MG/1
TABLET, DELAYED RELEASE ORAL
Qty: 60 TAB | Refills: 0 | Status: SHIPPED | OUTPATIENT
Start: 2021-02-03 | End: 2021-03-15

## 2021-03-14 DIAGNOSIS — R73.03 PREDIABETES: ICD-10-CM

## 2021-03-14 DIAGNOSIS — Z72.0 TOBACCO ABUSE: ICD-10-CM

## 2021-03-14 DIAGNOSIS — I10 HTN, GOAL BELOW 130/80: ICD-10-CM

## 2021-03-14 DIAGNOSIS — G25.2 INTENTION TREMOR: ICD-10-CM

## 2021-03-15 RX ORDER — CHLORTHALIDONE 25 MG/1
TABLET ORAL
Qty: 90 TAB | Refills: 0 | Status: SHIPPED | OUTPATIENT
Start: 2021-03-15 | End: 2021-10-11 | Stop reason: SDUPTHER

## 2021-03-15 RX ORDER — DICLOFENAC SODIUM 75 MG/1
TABLET, DELAYED RELEASE ORAL
Qty: 60 TAB | Refills: 0 | Status: SHIPPED | OUTPATIENT
Start: 2021-03-15 | End: 2021-05-28

## 2021-05-27 DIAGNOSIS — Z79.4 TYPE 2 DIABETES MELLITUS WITH HYPERGLYCEMIA, WITH LONG-TERM CURRENT USE OF INSULIN (HCC): ICD-10-CM

## 2021-05-27 DIAGNOSIS — E11.65 TYPE 2 DIABETES MELLITUS WITH HYPERGLYCEMIA, WITH LONG-TERM CURRENT USE OF INSULIN (HCC): ICD-10-CM

## 2021-05-28 RX ORDER — DICLOFENAC SODIUM 75 MG/1
TABLET, DELAYED RELEASE ORAL
Qty: 60 TABLET | Refills: 0 | Status: SHIPPED | OUTPATIENT
Start: 2021-05-28 | End: 2021-07-12

## 2021-05-28 RX ORDER — METFORMIN HYDROCHLORIDE 1000 MG/1
TABLET ORAL
Qty: 60 TABLET | Refills: 0 | Status: SHIPPED | OUTPATIENT
Start: 2021-05-28 | End: 2021-10-06 | Stop reason: SDUPTHER

## 2021-07-12 RX ORDER — DICLOFENAC SODIUM 75 MG/1
TABLET, DELAYED RELEASE ORAL
Qty: 60 TABLET | Refills: 0 | Status: SHIPPED | OUTPATIENT
Start: 2021-07-12 | End: 2021-07-19 | Stop reason: SDUPTHER

## 2021-07-16 ENCOUNTER — TELEPHONE (OUTPATIENT)
Dept: RHEUMATOLOGY | Age: 56
End: 2021-07-16

## 2021-07-16 NOTE — TELEPHONE ENCOUNTER
Spoke to pt informed pt per Dr Stephanie Garcia that a office visit is needed, pt stated he has been scheduled for 08/04 to see Dr Stephanie Garcia I verbally acknowledged understanding

## 2021-07-16 NOTE — TELEPHONE ENCOUNTER
----- Message from Mila Bowie MD sent at 7/13/2021  8:54 AM EDT -----  Please let patient know that he needs to make an apt

## 2021-07-18 ENCOUNTER — APPOINTMENT (OUTPATIENT)
Dept: GENERAL RADIOLOGY | Age: 56
End: 2021-07-18
Attending: EMERGENCY MEDICINE
Payer: COMMERCIAL

## 2021-07-18 VITALS
SYSTOLIC BLOOD PRESSURE: 140 MMHG | DIASTOLIC BLOOD PRESSURE: 99 MMHG | TEMPERATURE: 99.4 F | OXYGEN SATURATION: 99 % | BODY MASS INDEX: 43.95 KG/M2 | HEIGHT: 67 IN | RESPIRATION RATE: 18 BRPM | WEIGHT: 280 LBS | HEART RATE: 95 BPM

## 2021-07-18 PROCEDURE — 96372 THER/PROPH/DIAG INJ SC/IM: CPT

## 2021-07-18 PROCEDURE — 99283 EMERGENCY DEPT VISIT LOW MDM: CPT

## 2021-07-18 PROCEDURE — 73562 X-RAY EXAM OF KNEE 3: CPT

## 2021-07-19 ENCOUNTER — HOSPITAL ENCOUNTER (EMERGENCY)
Age: 56
Discharge: HOME | End: 2021-07-19
Attending: EMERGENCY MEDICINE
Payer: COMMERCIAL

## 2021-07-19 DIAGNOSIS — M05.741 RHEUMATOID ARTHRITIS INVOLVING BOTH HANDS WITH POSITIVE RHEUMATOID FACTOR (HCC): ICD-10-CM

## 2021-07-19 DIAGNOSIS — M05.742 RHEUMATOID ARTHRITIS INVOLVING BOTH HANDS WITH POSITIVE RHEUMATOID FACTOR (HCC): ICD-10-CM

## 2021-07-19 DIAGNOSIS — M17.11 PRIMARY OSTEOARTHRITIS OF RIGHT KNEE: Primary | ICD-10-CM

## 2021-07-19 PROCEDURE — 74011636637 HC RX REV CODE- 636/637: Performed by: EMERGENCY MEDICINE

## 2021-07-19 PROCEDURE — 74011250636 HC RX REV CODE- 250/636: Performed by: EMERGENCY MEDICINE

## 2021-07-19 PROCEDURE — 74011250637 HC RX REV CODE- 250/637: Performed by: EMERGENCY MEDICINE

## 2021-07-19 RX ORDER — ACETAMINOPHEN 500 MG
1000 TABLET ORAL ONCE
Status: COMPLETED | OUTPATIENT
Start: 2021-07-19 | End: 2021-07-19

## 2021-07-19 RX ORDER — DICLOFENAC SODIUM 75 MG/1
TABLET, DELAYED RELEASE ORAL
Qty: 60 TABLET | Refills: 1 | Status: SHIPPED | OUTPATIENT
Start: 2021-07-19 | End: 2022-01-07 | Stop reason: SDUPTHER

## 2021-07-19 RX ORDER — HYDROXYCHLOROQUINE SULFATE 200 MG/1
400 TABLET, FILM COATED ORAL DAILY
Qty: 60 TABLET | Refills: 6 | Status: SHIPPED | OUTPATIENT
Start: 2021-07-19 | End: 2021-08-04 | Stop reason: SDUPTHER

## 2021-07-19 RX ORDER — PREDNISONE 20 MG/1
60 TABLET ORAL
Status: COMPLETED | OUTPATIENT
Start: 2021-07-19 | End: 2021-07-19

## 2021-07-19 RX ORDER — ETANERCEPT 50 MG/ML
SOLUTION SUBCUTANEOUS
Qty: 4 EACH | Refills: 1 | Status: SHIPPED | OUTPATIENT
Start: 2021-07-19 | End: 2021-11-16

## 2021-07-19 RX ORDER — KETOROLAC TROMETHAMINE 30 MG/ML
60 INJECTION, SOLUTION INTRAMUSCULAR; INTRAVENOUS
Status: COMPLETED | OUTPATIENT
Start: 2021-07-19 | End: 2021-07-19

## 2021-07-19 RX ORDER — PREDNISONE 10 MG/1
TABLET ORAL
Qty: 42 TABLET | Refills: 0 | Status: SHIPPED | OUTPATIENT
Start: 2021-07-19 | End: 2021-10-06 | Stop reason: ALTCHOICE

## 2021-07-19 RX ADMIN — PREDNISONE 60 MG: 20 TABLET ORAL at 00:43

## 2021-07-19 RX ADMIN — ACETAMINOPHEN 1000 MG: 500 TABLET ORAL at 00:43

## 2021-07-19 RX ADMIN — KETOROLAC TROMETHAMINE 60 MG: 30 INJECTION, SOLUTION INTRAMUSCULAR; INTRAVENOUS at 00:43

## 2021-07-19 NOTE — ED PROVIDER NOTES
EMERGENCY DEPARTMENT HISTORY AND PHYSICAL EXAM      Date: 7/19/2021  Patient Name: Homer Fleming    History of Presenting Illness     Chief Complaint   Patient presents with    Knee Pain     ED visit d/t (R) knee pain - onset of sxs, 1wk ago - worsening sxs and pain - Denies fevers / fall / trauma;;       History Provided By: Patient    HPI: Homer Fleming, 49 y.o. male with a past medical history significant for Diabetes, rheumatoid arthritis, medical problems as stated below presents to the ED with cc of moderate to severe right knee pain, left knee pain, and left ankle pain over the last 1 to 2 weeks. Patient reports symptoms began to progress ever since he stopped his Enbrel, hydroxychloroquine, and his diclofenac. He ran out of these medication prescriptions and cannot see his rheumatologist for another 3 weeks. He reports no fevers or chills. No trauma or acute inciting event. This is the same pain he has suffered for many years. He has known both rheumatoid arthritis and osteoarthritis. No other associated symptoms. No other exacerbating ameliorating factors. There are no other complaints, changes, or physical findings at this time. PCP: Alessandra Mancini MD    No current facility-administered medications on file prior to encounter.      Current Outpatient Medications on File Prior to Encounter   Medication Sig Dispense Refill    [DISCONTINUED] diclofenac EC (VOLTAREN) 75 mg EC tablet TAKE ONE TABLET BY MOUTH TWICE A DAY 60 Tablet 0    metFORMIN (GLUCOPHAGE) 1,000 mg tablet TAKE ONE TABLET BY MOUTH TWICE A DAY WITH MEALS 60 Tablet 0    [DISCONTINUED] EnbreL SureClick 50 mg/mL (1 mL) injection INJECT 0.98 ML UNDER THE SKIN EVERY 7 DAYS 4 Each 1    chlorthalidone (HYGROTON) 25 mg tablet TAKE 1 TABLET BY MOUTH EVERY DAY 90 Tab 0    [DISCONTINUED] predniSONE (DELTASONE) 5 mg tablet 20mg x 3 days, 15mg x 3 days, 10mg x 3 days, 5mg x 3 days 30 Tab 1    nystatin (MYCOSTATIN) topical cream Apply  to affected area two (2) times a day. 30 g 3    potassium chloride (K-DUR, KLOR-CON) 20 mEq tablet Take 1 Tab by mouth daily. 30 Tab 3    ciclopirox (PENLAC) 8 % solution Apply 1 mL to affected area nightly. 1 Bottle 5    [DISCONTINUED] hydrOXYchloroQUINE (PLAQUENIL) 200 mg tablet Take 2 Tabs by mouth daily. 60 Tab 6    OTHER C-PAP MACHINE WHILE SLEEPING      turmeric 400 mg cap Take  by mouth daily.  fish oil-omega-3 fatty acids (FISH OIL) 340-1,000 mg capsule Take 1 Cap by mouth daily.  cholecalciferol (VITAMIN D3) 1,000 unit tablet Take  by mouth daily.  cyanocobalamin (VITAMIN B-12) 500 mcg tablet Take 500 mcg by mouth daily.  folic acid (FOLVITE) 1 mg tablet TAKE 1 TABLET BY MOUTH DAILY (Patient not taking: Reported on 9/4/2020) 1 Tab 3    tacrolimus (PROTOPIC) 0.1 % ointment Apply  to affected area two (2) times a day. 30 g 6    [DISCONTINUED] methotrexate (RHEUMATREX) 2.5 mg tablet Take  by mouth every Sunday. Takes 4 Tablets Every Sunday      aspirin delayed-release 81 mg tablet Take 1 Tab by mouth daily.  (Patient not taking: Reported on 8/26/2020) 30 Tab 11       Past History     Past Medical History:  Past Medical History:   Diagnosis Date    Cellulitis and abscess of trunk 7/31/2014    Chronic back pain greater than 3 months duration 7/31/2014    [facility] [address]     Family history of prostate cancer 7/31/2014    Hyperhydrosis disorder 7/31/2014    Hypertension     Intention tremor 12/9/2015    Prediabetes 6/25/2014    RA (rheumatoid arthritis) ([address] 7/31/2014    Rash of perineum 6/25/2014    Tinea manus 1/19/2016    Tinea pedis of both feet 1/19/2016    Tobacco abuse 6/25/2014       Past Surgical History:  Past Surgical History:   Procedure Laterality Date    HX ORTHOPAEDIC      right leg and knee       Family History:  Family History   Problem Relation Age of Onset    Diabetes Mother     Hypertension Mother     Stroke Mother     Diabetes Father  Hypertension Father        Social History:  Social History     Tobacco Use    Smoking status: Former Smoker     Packs/day: 1.00    Smokeless tobacco: Never Used   Substance Use Topics    Alcohol use: Yes     Comment: occasionally    Drug use: No       Allergies:  No Known Allergies      Review of Systems   Review of Systems   Constitutional: Negative for chills, diaphoresis, fatigue and fever. HENT: Negative for ear pain and sore throat. Eyes: Negative for pain and redness. Respiratory: Negative for cough and shortness of breath. Cardiovascular: Negative for chest pain and leg swelling. Gastrointestinal: Negative for abdominal pain, diarrhea, nausea and vomiting. Endocrine: Negative for cold intolerance and heat intolerance. Genitourinary: Negative for flank pain and hematuria. Musculoskeletal: Positive for arthralgias. Negative for back pain and neck stiffness. Skin: Negative for rash and wound. Neurological: Negative for dizziness, syncope and headaches. All other systems reviewed and are negative. Physical Exam   Physical Exam  Vitals and nursing note reviewed. Constitutional:       General: He is not in acute distress. Appearance: He is well-developed. He is not ill-appearing. HENT:      Head: Normocephalic and atraumatic. Mouth/Throat:      Pharynx: No oropharyngeal exudate. Eyes:      Conjunctiva/sclera: Conjunctivae normal.      Pupils: Pupils are equal, round, and reactive to light. Cardiovascular:      Rate and Rhythm: Normal rate and regular rhythm. Heart sounds: No murmur heard. Pulmonary:      Effort: Pulmonary effort is normal. No respiratory distress. Breath sounds: Normal breath sounds. No wheezing. Abdominal:      General: Bowel sounds are normal. There is no distension. Palpations: Abdomen is soft. Tenderness: There is no abdominal tenderness. Musculoskeletal:         General: No deformity. Normal range of motion. Cervical back: Normal range of motion. Legs:       Comments: Patient has possible trace effusions on both the right and left knee. There is no warmth to the area no significant restriction of range of motion. He also has mild swelling to his left ankle with no warmth or signs of infection. This appears to be chronic in nature with no acute inciting event. He is neurovascularly intact in both lower extremities. Skin:     General: Skin is warm and dry. Findings: No rash. Neurological:      Mental Status: He is alert and oriented to person, place, and time. Coordination: Coordination normal.   Psychiatric:         Behavior: Behavior normal.         Diagnostic Study Results     Labs -   No results found for this or any previous visit (from the past 24 hour(s)). Radiologic Studies -   XR KNEE RT 3 V   Final Result   Tricompartment osteoarthritis        CT Results  (Last 48 hours)    None        CXR Results  (Last 48 hours)    None            Medical Decision Making   I am the first provider for this patient. I reviewed the vital signs, available nursing notes, past medical history, past surgical history, family history and social history. Vital Signs-Reviewed the patient's vital signs. Patient Vitals for the past 12 hrs:   Temp Pulse Resp BP SpO2   07/18/21 2326 99.4 °F (37.4 °C) 95 18 (!) 140/99 99 %       Records Reviewed: Nursing records and medical records reviewed    MDM:      Provider Notes (Medical Decision Making):   Patient is a 61-year-old male presenting with atraumatic polyarthralgias of the lower extremities that appears to be an acute exacerbation of a chronic medical condition. I believe this is due to cessation of his chronic medications which I will be prescribed to him. Given the likely inflammatory component to this I am prescribing a short course of prednisone as well.   Strongly urged to follow-up with his rheumatologist as an outpatient and he will return with any worsening symptoms. I see no evidence of septic arthritis or other acute emergent condition. ED Course:   Initial assessment performed. The patients presenting problems have been discussed, and they are in agreement with the care plan formulated and outlined with them. I have encouraged them to ask questions as they arise throughout their visit. Medications Administered     acetaminophen (TYLENOL) tablet 1,000 mg     Admin Date  07/19/2021 Action  Given Dose  1,000 mg Route  Oral Administered By  Miya ETJADA          ketorolac (TORADOL) injection 60 mg     Admin Date  07/19/2021 Action  Given Dose  60 mg Route  IntraMUSCular Administered By  Miya TEJADA          predniSONE (DELTASONE) tablet 60 mg     Admin Date  07/19/2021 Action  Given Dose  60 mg Route  Oral Administered By  Dany 95                    Critical Care:  None      Disposition:  2:52 AM  Fernando Leblanc's  results have been reviewed with him. He has been counseled regarding his diagnosis. He verbally conveys understanding and agreement of the signs, symptoms, diagnosis, treatment and prognosis and additionally agrees to follow up as recommended with Dr. Jennifer Chaves MD in 24 - 48 hours. He also agrees with the care-plan and conveys that all of his questions have been answered. I have also put together some discharge instructions for him that include: 1) educational information regarding their diagnosis, 2) how to care for their diagnosis at home, as well a 3) list of reasons why they would want to return to the ED prior to their follow-up appointment, should their condition change. DISCHARGE PLAN:  1. Current Discharge Medication List      START taking these medications    Details   predniSONE (STERAPRED DS) 10 mg dose pack Take 5tab(50mg)x3days, 4tab(40mg)x3days, 3tab(30mg)x3days, 2tab(20mg)x2days, 1tab(10mg)x2days.  #42  Qty: 42 Tablet, Refills: 0  Start date: 7/19/2021 CONTINUE these medications which have CHANGED    Details   diclofenac EC (VOLTAREN) 75 mg EC tablet Take 1 tablets twice daily  Qty: 60 Tablet, Refills: 1  Start date: 2021      EnbreL SureClick 50 mg/mL (1 mL) injection INJECT 0.98 ML UNDER THE SKIN EVERY 7 DAYS  Qty: 4 Each, Refills: 1  Start date: 2021      hydrOXYchloroQUINE (PLAQUENIL) 200 mg tablet Take 2 Tablets by mouth daily. Qty: 60 Tablet, Refills: 6  Start date: 2021    Associated Diagnoses: Rheumatoid arthritis involving both hands with positive rheumatoid factor ([facility] [address]         STOP taking these medications       predniSONE (DELTASONE) 5 mg tablet Comments:   Reason for Stoppin.   Follow-up Information     Follow up With Specialties Details Why Contact Info    Dorothea Gunter MD Rheumatology, Pediatric Rheumatology, Internal Medicine In 1 week For a follow-up evaluation.  [facility]  [phone]      Romina Alcaraz MD Family Medicine In 1 week For a follow-up evaluation. [address]  [phone]      [facility] EMERGENCY DEPT Emergency Medicine In 2 days If symptoms worsen [address]  [address]  [phone]        3. Return to ED if worse     Diagnosis     Clinical Impression:   1. Primary osteoarthritis of right knee    2. Rheumatoid arthritis involving both hands with positive rheumatoid factor (HCC)        Attestations:    Reynaldo Sloan MD    Please note that this dictation was completed with [employer], the computer voice recognition software. Quite often unanticipated grammatical, syntax, homophones, and other interpretive errors are inadvertently transcribed by the computer software. Please disregard these errors. Please excuse any errors that have escaped final proofreading. Thank you.

## 2021-07-19 NOTE — ED NOTES
5485 - Patient discharge by Anatoliy Romano MD - pt sent to the [facility], via wheelchair for safety -  Discharge information / home RX / and reasons to return to the ED were reviewed by the doctor.

## 2021-07-29 ENCOUNTER — OFFICE VISIT (OUTPATIENT)
Dept: FAMILY MEDICINE CLINIC | Age: 56
End: 2021-07-29
Payer: COMMERCIAL

## 2021-07-29 VITALS
TEMPERATURE: 98.2 F | DIASTOLIC BLOOD PRESSURE: 96 MMHG | RESPIRATION RATE: 18 BRPM | OXYGEN SATURATION: 96 % | WEIGHT: 278.3 LBS | SYSTOLIC BLOOD PRESSURE: 171 MMHG | BODY MASS INDEX: 43.68 KG/M2 | HEART RATE: 75 BPM | HEIGHT: 67 IN

## 2021-07-29 DIAGNOSIS — Z79.4 TYPE 2 DIABETES MELLITUS WITH HYPERGLYCEMIA, WITH LONG-TERM CURRENT USE OF INSULIN (HCC): Primary | ICD-10-CM

## 2021-07-29 DIAGNOSIS — E11.65 TYPE 2 DIABETES MELLITUS WITH HYPERGLYCEMIA, WITH LONG-TERM CURRENT USE OF INSULIN (HCC): Primary | ICD-10-CM

## 2021-07-29 DIAGNOSIS — Z71.89 ADVICE GIVEN ABOUT COVID-19 VIRUS INFECTION: ICD-10-CM

## 2021-07-29 DIAGNOSIS — I10 HTN, GOAL BELOW 140/90: ICD-10-CM

## 2021-07-29 DIAGNOSIS — M10.00 IDIOPATHIC GOUT, UNSPECIFIED CHRONICITY, UNSPECIFIED SITE: ICD-10-CM

## 2021-07-29 LAB
ALBUMIN SERPL-MCNC: 3.8 G/DL (ref 3.5–5)
ALBUMIN/GLOB SERPL: 1 {RATIO} (ref 1.1–2.2)
ALP SERPL-CCNC: 108 U/L (ref 45–117)
ALT SERPL-CCNC: 32 U/L (ref 12–78)
ANION GAP SERPL CALC-SCNC: 7 MMOL/L (ref 5–15)
APPEARANCE UR: CLEAR
AST SERPL-CCNC: 11 U/L (ref 15–37)
BACTERIA URNS QL MICRO: ABNORMAL /HPF
BILIRUB SERPL-MCNC: 0.4 MG/DL (ref 0.2–1)
BILIRUB UR QL: NEGATIVE
BUN SERPL-MCNC: 15 MG/DL (ref 6–20)
BUN/CREAT SERPL: 17 (ref 12–20)
CALCIUM SERPL-MCNC: 9.8 MG/DL (ref 8.5–10.1)
CHLORIDE SERPL-SCNC: 97 MMOL/L (ref 97–108)
CHOLEST SERPL-MCNC: 151 MG/DL
CO2 SERPL-SCNC: 30 MMOL/L (ref 21–32)
COLOR UR: ABNORMAL
CREAT SERPL-MCNC: 0.9 MG/DL (ref 0.7–1.3)
EPITH CASTS URNS QL MICRO: ABNORMAL /LPF
ERYTHROCYTE [DISTWIDTH] IN BLOOD BY AUTOMATED COUNT: 14.6 % (ref 11.5–14.5)
EST. AVERAGE GLUCOSE BLD GHB EST-MCNC: 157 MG/DL
GLOBULIN SER CALC-MCNC: 3.7 G/DL (ref 2–4)
GLUCOSE SERPL-MCNC: 183 MG/DL (ref 65–100)
GLUCOSE UR STRIP.AUTO-MCNC: NEGATIVE MG/DL
HBA1C MFR BLD: 7.1 % (ref 4–5.6)
HCT VFR BLD AUTO: 45.5 % (ref 36.6–50.3)
HDLC SERPL-MCNC: 83 MG/DL
HDLC SERPL: 1.8 {RATIO} (ref 0–5)
HGB BLD-MCNC: 15.4 G/DL (ref 12.1–17)
HGB UR QL STRIP: NEGATIVE
HYALINE CASTS URNS QL MICRO: ABNORMAL /LPF (ref 0–5)
KETONES UR QL STRIP.AUTO: NEGATIVE MG/DL
LDLC SERPL CALC-MCNC: 50.4 MG/DL (ref 0–100)
LEUKOCYTE ESTERASE UR QL STRIP.AUTO: ABNORMAL
MCH RBC QN AUTO: 27.7 PG (ref 26–34)
MCHC RBC AUTO-ENTMCNC: 33.8 G/DL (ref 30–36.5)
MCV RBC AUTO: 82 FL (ref 80–99)
NITRITE UR QL STRIP.AUTO: NEGATIVE
NRBC # BLD: 0 K/UL (ref 0–0.01)
NRBC BLD-RTO: 0 PER 100 WBC
PH UR STRIP: 5.5 [PH] (ref 5–8)
PLATELET # BLD AUTO: 375 K/UL (ref 150–400)
PMV BLD AUTO: 10.7 FL (ref 8.9–12.9)
POTASSIUM SERPL-SCNC: 4.1 MMOL/L (ref 3.5–5.1)
PROT SERPL-MCNC: 7.5 G/DL (ref 6.4–8.2)
PROT UR STRIP-MCNC: NEGATIVE MG/DL
PSA SERPL-MCNC: 0.9 NG/ML (ref 0.01–4)
RBC # BLD AUTO: 5.55 M/UL (ref 4.1–5.7)
RBC #/AREA URNS HPF: ABNORMAL /HPF (ref 0–5)
SODIUM SERPL-SCNC: 134 MMOL/L (ref 136–145)
SP GR UR REFRACTOMETRY: 1.02 (ref 1–1.03)
TRIGL SERPL-MCNC: 88 MG/DL (ref ?–150)
URATE SERPL-MCNC: 4.5 MG/DL (ref 3.5–7.2)
UROBILINOGEN UR QL STRIP.AUTO: 0.2 EU/DL (ref 0.2–1)
VLDLC SERPL CALC-MCNC: 17.6 MG/DL
WBC # BLD AUTO: 13.7 K/UL (ref 4.1–11.1)
WBC URNS QL MICRO: ABNORMAL /HPF (ref 0–4)

## 2021-07-29 PROCEDURE — 99214 OFFICE O/P EST MOD 30 MIN: CPT | Performed by: FAMILY MEDICINE

## 2021-07-29 RX ORDER — TRAMADOL HYDROCHLORIDE 50 MG/1
50 TABLET ORAL
Qty: 24 TABLET | Refills: 0 | Status: SHIPPED | OUTPATIENT
Start: 2021-07-29 | End: 2021-08-05

## 2021-07-29 RX ORDER — SILDENAFIL 100 MG/1
100 TABLET, FILM COATED ORAL AS NEEDED
Qty: 12 TABLET | Refills: 11 | Status: SHIPPED | OUTPATIENT
Start: 2021-07-29

## 2021-07-29 NOTE — TELEPHONE ENCOUNTER
Patient wants to get the medication Viagra.   If any questions please give him a call @ [phone]

## 2021-07-29 NOTE — PROGRESS NOTES
HISTORY OF PRESENT ILLNESS  Pawan Crump is a 49 y.o. male. present with current medical history of HTN, diab Hypercholest,  the current medications social family and surgical history lists,  review'd today with the following complaint and concern        Lost his wife she left him,  lost his daughter lost due to the AS, at the age of 31yr,   another family member  Lost his job, opting counseling,    Today pt also present for Bp check, for which the pt has been a compliant w/ the bp meds, in addition pt trying to the best to have a low salt diet and to be as active as possible,   pt sometimes obtain the bp with the average of  >140/90,  At this time, pt denies  Having Chest Pain, has no legs swelling and not feeling lightheadedness, in addition, the pat has not been feeling anxious, and  Has not been stressed out,       today patient present for f/u regarding the diabetic state, who has been compliancy w/ meds, who is also trying to have a less of starchy diabetic diet, and eat  diet, since last visit, patient has been more active ,  +++ Rf needed for today. patient  Currently denies tingling sensation, has no polyurea and polydipsia, last a1c was at target,   Hemoglobin A1c nl is at 4.8 - 5.6 % last visit 6.3    Went to ER for the pain in his joints,  was put on prenisone havign couple tabs left,  278 lb 4.8 oz (126.2 kg)  as of 7/29/2021 278 lb 4.8 oz (126.2 kg) 280 lb (127 kg) 285 lb 9.6 oz (129.5 kg) 289 lb      fppt pain      Current Outpatient Medications   Medication Sig Dispense Refill    diclofenac EC (VOLTAREN) 75 mg EC tablet Take 1 tablets twice daily 60 Tablet 1    EnbreL SureClick 50 mg/mL (1 mL) injection INJECT 0.98 ML UNDER THE SKIN EVERY 7 DAYS 4 Each 1    predniSONE (STERAPRED DS) 10 mg dose pack Take 5tab(50mg)x3days, 4tab(40mg)x3days, 3tab(30mg)x3days, 2tab(20mg)x2days, 1tab(10mg)x2days. #42 42 Tablet 0    hydrOXYchloroQUINE (PLAQUENIL) 200 mg tablet Take 2 Tablets by mouth daily.  61 Tablet 6    metFORMIN (GLUCOPHAGE) 1,000 mg tablet TAKE ONE TABLET BY MOUTH TWICE A DAY WITH MEALS 60 Tablet 0    chlorthalidone (HYGROTON) 25 mg tablet TAKE 1 TABLET BY MOUTH EVERY DAY 90 Tab 0    OTHER C-PAP MACHINE WHILE SLEEPING      nystatin (MYCOSTATIN) topical cream Apply  to affected area two (2) times a day. (Patient not taking: Reported on 7/29/2021) 30 g 3    potassium chloride (K-DUR, KLOR-CON) 20 mEq tablet Take 1 Tab by mouth daily. (Patient not taking: Reported on 7/29/2021) 30 Tab 3    ciclopirox (PENLAC) 8 % solution Apply 1 mL to affected area nightly. (Patient not taking: Reported on 7/29/2021) 1 Bottle 5    turmeric 400 mg cap Take  by mouth daily. (Patient not taking: Reported on 7/29/2021)      fish oil-omega-3 fatty acids (FISH OIL) 340-1,000 mg capsule Take 1 Cap by mouth daily. (Patient not taking: Reported on 7/29/2021)      cholecalciferol (VITAMIN D3) 1,000 unit tablet Take  by mouth daily. (Patient not taking: Reported on 7/29/2021)      cyanocobalamin (VITAMIN B-12) 500 mcg tablet Take 500 mcg by mouth daily. (Patient not taking: Reported on 8/71/7890)      folic acid (FOLVITE) 1 mg tablet TAKE 1 TABLET BY MOUTH DAILY (Patient not taking: Reported on 9/4/2020) 1 Tab 3    tacrolimus (PROTOPIC) 0.1 % ointment Apply  to affected area two (2) times a day. (Patient not taking: Reported on 7/29/2021) 30 g 6    aspirin delayed-release 81 mg tablet Take 1 Tab by mouth daily.  (Patient not taking: Reported on 8/26/2020) 30 Tab 11     No Known Allergies  Past Medical History:   Diagnosis Date    Cellulitis and abscess of trunk 7/31/2014    Chronic back pain greater than 3 months duration 7/31/2014    [facility]     Family history of prostate cancer 7/31/2014    Hyperhydrosis disorder 7/31/2014    Hypertension     Intention tremor 12/9/2015    Prediabetes 6/25/2014    RA (rheumatoid arthritis) ([facility] 7/31/2014    Rash of perineum 6/25/2014    Tinea manus 1/19/2016    Tinea pedis of both feet 1/19/2016    Tobacco abuse 6/25/2014     Past Surgical History:   Procedure Laterality Date    HX ORTHOPAEDIC      right leg and knee     Family History   Problem Relation Age of Onset    Diabetes Mother     Hypertension Mother     Stroke Mother     Diabetes Father     Hypertension Father      Social History     Tobacco Use    Smoking status: Former Smoker     Packs/day: 1.00    Smokeless tobacco: Never Used   Substance Use Topics    Alcohol use: Yes     Comment: occasionally      Lab Results   Component Value Date/Time    WBC 10.1 11/23/2020 11:30 AM    HGB 14.8 11/23/2020 11:30 AM    HCT 43.2 11/23/2020 11:30 AM    PLATELET 847 47/34/2484 11:30 AM    MCV 79 11/23/2020 11:30 AM     Lab Results   Component Value Date/Time    Hemoglobin A1c 6.3 (H) 08/27/2020 03:46 PM    Hemoglobin A1c 6.4 (H) 07/17/2019 03:38 PM    Hemoglobin A1c 5.5 01/19/2016 12:26 PM    Glucose 138 (H) 11/23/2020 11:30 AM    Glucose (POC) 276 (H) 02/17/2020 11:09 AM    LDL, calculated 66 07/17/2019 03:38 PM    Creatinine 1.03 11/23/2020 11:30 AM      Lab Results   Component Value Date/Time    Cholesterol, total 145 07/17/2019 03:38 PM    HDL Cholesterol 58 07/17/2019 03:38 PM    LDL, calculated 66 07/17/2019 03:38 PM    Triglyceride 106 07/17/2019 03:38 PM        Review of Systems   Constitutional: Negative for chills, fever and malaise/fatigue. HENT: Negative for nosebleeds. Eyes: Negative for pain. Respiratory: Negative for cough and wheezing. Cardiovascular: Negative for chest pain and leg swelling. Gastrointestinal: Negative for constipation, diarrhea and nausea. Genitourinary: Negative for frequency. Musculoskeletal: Negative for joint pain and myalgias. Skin: Negative for rash. Neurological: Negative for loss of consciousness and headaches. Endo/Heme/Allergies: Does not bruise/bleed easily. Psychiatric/Behavioral: Negative for depression.  The patient is not nervous/anxious and does not have insomnia. All other systems reviewed and are negative. Physical Exam  Vitals and nursing note reviewed. Constitutional:       Appearance: He is well-developed. HENT:      Head: Normocephalic and atraumatic. Mouth/Throat:      Pharynx: No oropharyngeal exudate. Eyes:      Conjunctiva/sclera: Conjunctivae normal.      Pupils: Pupils are equal, round, and reactive to light. Neck:      Thyroid: No thyromegaly. Vascular: No JVD. Cardiovascular:      Rate and Rhythm: Normal rate and regular rhythm. Heart sounds: Normal heart sounds. No murmur heard. No friction rub. Pulmonary:      Effort: Pulmonary effort is normal. No respiratory distress. Breath sounds: Normal breath sounds. No wheezing or rales. Abdominal:      General: Bowel sounds are normal. There is no distension. Palpations: Abdomen is soft. Tenderness: There is no abdominal tenderness. Musculoskeletal:         General: No tenderness. Cervical back: Normal range of motion and neck supple. Lymphadenopathy:      Cervical: No cervical adenopathy. Skin:     General: Skin is warm. Findings: No erythema or rash. Neurological:      Mental Status: He is alert and oriented to person, place, and time. Deep Tendon Reflexes: Reflexes are normal and symmetric. Psychiatric:         Behavior: Behavior normal.         ASSESSMENT and PLAN  Diagnoses and all orders for this visit:    1. Type 2 diabetes mellitus with hyperglycemia, with long-term current use of insulin (HCC)  -     CBC W/O DIFF; Future  -     LIPID PANEL; Future  -     HEMOGLOBIN A1C WITH EAG; Future  -     METABOLIC PANEL, COMPREHENSIVE; Future  -     URINALYSIS W/ RFLX MICROSCOPIC; Future  -     PSA, DIAGNOSTIC (PROSTATE SPECIFIC AG); Future  -     URIC ACID; Future  -     traMADoL (ULTRAM) 50 mg tablet; Take 1 Tablet by mouth every six (6) hours as needed for Pain for up to 7 days.  Max Daily Amount: 200 mg.    2. HTN, goal below 140/90  -     CBC W/O DIFF; Future  -     LIPID PANEL; Future  -     HEMOGLOBIN A1C WITH EAG; Future  -     METABOLIC PANEL, COMPREHENSIVE; Future  -     URINALYSIS W/ RFLX MICROSCOPIC; Future  -     PSA, DIAGNOSTIC (PROSTATE SPECIFIC AG); Future  -     URIC ACID; Future  -     traMADoL (ULTRAM) 50 mg tablet; Take 1 Tablet by mouth every six (6) hours as needed for Pain for up to 7 days. Max Daily Amount: 200 mg.    3. Idiopathic gout, unspecified chronicity, unspecified site  -     URIC ACID; Future  -     traMADoL (ULTRAM) 50 mg tablet; Take 1 Tablet by mouth every six (6) hours as needed for Pain for up to 7 days. Max Daily Amount: 200 mg.    4. Advice given about COVID-19 virus infection    Patient was told to lessen the amount of pain medication continue with weight reduction do some massage therapy chiropractor exercise therapy such as resistance banding avoid heavy lifting heavy pushing at this time include ibuprofen and Tylenol over-the-counter topical cream , patient was told to take some Tums or over-the-counter PPI if abdominal upset do ice therapy, side effect of current pain medication significantly including its devastating addiction, explained in detail   in addition, pt was told to avoid machinary operation and driving while on any pain based medications that will cause dizziness, drowsiness, and sleepiness.   Dependency and tolerancy were also addressed,  meds side effects and compliancy advised,  Call or rtc if worsens, radiology results and schedule of future radiology studies reviewed with patient, patient was also told to avoid any alcoholic beverages or any new medication which could potentiate its effect possibility of overdose abuse of other illicit drug keeping the medication is safe place, patient acknowledged understanding and agreed with recommendation  Obtained labs

## 2021-07-29 NOTE — PROGRESS NOTES
1. Have you been to the ER, urgent care clinic since your last visit? Hospitalized since your last visit? Yes Went last week to [facility] for pain. 2. Have you seen or consulted any other health care providers outside of the [address] [place] [address] since your last visit? Include any pap smears or colon screening. No       Chief Complaint   Patient presents with    Follow-up       Patient identity verified with two types of identifiers.

## 2021-08-04 ENCOUNTER — OFFICE VISIT (OUTPATIENT)
Dept: RHEUMATOLOGY | Age: 56
End: 2021-08-04
Payer: COMMERCIAL

## 2021-08-04 VITALS
SYSTOLIC BLOOD PRESSURE: 99 MMHG | DIASTOLIC BLOOD PRESSURE: 78 MMHG | BODY MASS INDEX: 43.38 KG/M2 | WEIGHT: 277 LBS | TEMPERATURE: 98.2 F | RESPIRATION RATE: 16 BRPM | HEART RATE: 77 BPM | OXYGEN SATURATION: 95 %

## 2021-08-04 DIAGNOSIS — M05.742 RHEUMATOID ARTHRITIS INVOLVING BOTH HANDS WITH POSITIVE RHEUMATOID FACTOR (HCC): ICD-10-CM

## 2021-08-04 DIAGNOSIS — M05.741 RHEUMATOID ARTHRITIS INVOLVING BOTH HANDS WITH POSITIVE RHEUMATOID FACTOR (HCC): ICD-10-CM

## 2021-08-04 PROCEDURE — 99214 OFFICE O/P EST MOD 30 MIN: CPT | Performed by: PEDIATRICS

## 2021-08-04 RX ORDER — ETANERCEPT 50 MG/ML
50 SOLUTION SUBCUTANEOUS
Qty: 3.92 ML | Refills: 3 | Status: SHIPPED | OUTPATIENT
Start: 2021-08-04 | End: 2021-09-03

## 2021-08-04 RX ORDER — HYDROXYCHLOROQUINE SULFATE 200 MG/1
400 TABLET, FILM COATED ORAL DAILY
Qty: 60 TABLET | Refills: 6 | Status: SHIPPED | OUTPATIENT
Start: 2021-08-04 | End: 2022-07-07

## 2021-08-04 NOTE — PROGRESS NOTES
Chief Complaint   Patient presents with    Joint Pain     1. Have you been to the ER, urgent care clinic since your last visit? Hospitalized since your last visit? Yes seen in the Er for joint flare up   2. Have you seen or consulted any other health care providers outside of the [address] [place] [address] since your last visit? Include any pap smears or colon screening.  No

## 2021-08-04 NOTE — PROGRESS NOTES
RHEUMATOLOGY PROBLEM LIST AND CHIEF COMPLAINT  1. Rheumatoid Arthritis (2015) - Joint pain/swelling, synovitis in hands, wrists, elbows, rheumatoid nodules on elbows, morning stiffness. Elevated ESR, CCP, RF, CRP. Therapy History:  Previous DMARDs: MTX (Past-09/2016, 10/2016 - 12/2017; AEs- nodulosis)   Current DMARDs: Enbrel (11/2015-current), Plaquenil (2015 - 2017, 3/2018-current)  Covid-19 vaccination (+)    2. Shoulder OA and rotator cuff disease    INTERVAL HISTORY  This is a 49 y.o.  male. Today, the patient complains of pain in the joints. Location: generalized  Severity:  4 on a scale of 0-10  Timing: all day  Duration:  9 months  Context/Associated signs and symptoms: The patient's chief complaint is development of new nodulosis over his right elbow. He continues on Enbrel 50 mg weekly and Plaquenil 400 mg daily. He states he has been adherent to his regimen, except for a 2-3 week delay of Enbrel that was refilled by the ER. He was evaluated and treated for a flare with steroid injection and oral steroids by the ER. He is not currently on steroids. However he recently finished a Medrol dose pack for relief of pain over the sides of his knees. He states knee discomfort is occasionally associated with clicking, L>R. He notes that he is trying to lose weight. Recent labs reviewed showed overall unremarkable CBC and CMP except elevated glucose levels and A1C (7.1). He states that he is covered by Medicaid in addition to [place] by his job. Of note, the patient has received one dose of a Covid vaccine and plans to obtain second dose on 8/9.        RHEUMATOLOGY REVIEW OF SYSTEMS   Positives as per history  Negatives as follows:  Almaz Lemus[place]:  Denies unexplained persistent fevers, weight change, chronic fatigue  HEAD/EYES:   Denies eye redness, blurry vision or sudden loss of vision, dry eyes, HA, temporal artery pain  ENT:    Denies oral/nasal ulcers, recurrent sinus infections, dry mouth  RESPIRATORY:  No pleuritic pain, history of pleural effusions, hemoptysis, exertional dyspnea  CARDIOVASCULAR:  Denies chest pain, history of pericardial effusions  GASTRO:   Denies heartburn, esophageal dysmotility, abdominal pain, nausea, vomiting, diarrhea, blood in the stool  HEMATOLOGIC:  No easy bruising, purpura, swollen lymph nodes  SKIN:    Denies alopecia, ulcers, nodules, sun sensitivity, unexplained persistent rash   VASCULAR:   Denies edema, cyanosis, raynaud phenomenon  NEUROLOGIC:  Denies specific muscle weakness, paresthesias   PSYCHIATRIC:  No sleep disturbance / snoring, depression, anxiety  MSK:    No morning stiffness >1 hour, SI joint pain, persistent joint swelling    PAST MEDICAL HISTORY  Reviewed with patient, significant changes in medical history - no    PHYSICAL EXAM  Blood pressure 99/78, pulse 77, temperature 98.2 °F (36.8 °C), temperature source Oral, resp. rate 16, weight 277 lb (125.6 kg), SpO2 95 %. GENERAL APPEARANCE: Well-nourished, no acute distress  NECK: No adenopathy  ENT: No oral ulcers  CARDIOVASCULAR: Heart rhythm is regular. No murmur, rub, gallop  CHEST: Normal vesicular breath sounds. No wheezes, rales, pleural friction rubs  ABDOMINAL: The abdomen is soft and nontender. Bowel sounds are normal  SKIN: No rash, palpable purpura, digital ulcer, abnormal thickening   MUSCULOSKELETAL: Rotator cuff signs on the left - unchanged, Antalgic gait  Upper extremities -  2 Nodules noted over left elbow - unchanged. No synovitis. Minimal synovial thickening in the right 3rd digit, MCP & PIP, no synovitis. Decreased ROM of left shoulder. - unchanged  Lower extremities - B/L Knee crepitus, bony prominence (R>L), scar over right knee from previous surgery. LABS, RADIOLOGY AND PROCEDURES   Previous labs reviewed -Yes  Last TB 12/2017    ASSESSMENT  1. Rheumatoid Arthritis -(is unchanged)- The patient's disease remains relatively well managed on his current regimen.  He should continue on Enbrel 50 mg weekly and Plaquenil 400 mg daily. We will begin to seek full re-approval of . Labs are not needed today. I will plan to order labs at next visit. He should follow up in 4 months. 2. Drug therapy monitoring for toxicity (methotrexate/Enbrel) - CBC, BUN, Cr, AST, ALT and albumin every 4 months  3. Osteoarthritis -(has improved)- The patient has improved knee discomfort with use of steroid pack. I will provide a referral to orthopedics for further evaluation. He should continue with diclofenac PRN and weight loss. PLAN  1. Enbrel 50 mg weekly - seek re-approval   2. Plaquenil 400 mg daily  3. Return in 4 months    Refugio Romero MD  Adult and Pediatric Rheumatology     [address], [address] [place] [address], Phone [phone], Fax [phone]     Visiting  of Pediatrics    Department of Pediatrics, [facility] of [address] [place] [address], [address] [place] [address], Phone [phone], Fax [phone]    There are no Patient Instructions on file for this visit.     cc:  Bethany Burgess MD    Written by jeffery Mtz, as dictated by Dr. Elease Severin, M.D.

## 2021-08-19 ENCOUNTER — DOCUMENTATION ONLY (OUTPATIENT)
Dept: PHARMACY | Age: 56
End: 2021-08-19

## 2021-08-19 NOTE — PROGRESS NOTES
[facility] at [address] [facility] Update    Date: 8/17/2021    Fernandez Yuen 1965     Medication: Enbrel 52TE/QK Sureclick    Prior Authorization: No PA Required    Co-pay $0    Fill: 8/16/2021    Ship: 8/16/2021    Deliver: 8/17/2021    Next Fill Due: 9/8/2021    Pharmacist offered counseling to the patient. Handout provided.     Sandi Rooney, [address] at [employer] [place] [address] [place], [address]  phone: [phone]   fax: [phone]

## 2021-10-06 ENCOUNTER — OFFICE VISIT (OUTPATIENT)
Dept: FAMILY MEDICINE CLINIC | Age: 56
End: 2021-10-06
Payer: COMMERCIAL

## 2021-10-06 VITALS
OXYGEN SATURATION: 100 % | RESPIRATION RATE: 18 BRPM | HEART RATE: 94 BPM | BODY MASS INDEX: 42.74 KG/M2 | WEIGHT: 272.3 LBS | HEIGHT: 67 IN | SYSTOLIC BLOOD PRESSURE: 111 MMHG | DIASTOLIC BLOOD PRESSURE: 74 MMHG

## 2021-10-06 DIAGNOSIS — Z79.4 TYPE 2 DIABETES MELLITUS WITH HYPERGLYCEMIA, WITH LONG-TERM CURRENT USE OF INSULIN (HCC): ICD-10-CM

## 2021-10-06 DIAGNOSIS — L60.0 INGROWN TOENAIL: ICD-10-CM

## 2021-10-06 DIAGNOSIS — E11.65 TYPE 2 DIABETES MELLITUS WITH HYPERGLYCEMIA, WITH LONG-TERM CURRENT USE OF INSULIN (HCC): ICD-10-CM

## 2021-10-06 DIAGNOSIS — R35.0 FREQUENT URINATION: Primary | ICD-10-CM

## 2021-10-06 DIAGNOSIS — S90.222A SUBUNGUAL HEMATOMA OF TOENAIL OF LEFT FOOT, INITIAL ENCOUNTER: ICD-10-CM

## 2021-10-06 LAB
BILIRUB UR QL STRIP: NEGATIVE
GLUCOSE UR-MCNC: NORMAL MG/DL
HBA1C MFR BLD HPLC: 9.5 %
KETONES P FAST UR STRIP-MCNC: NEGATIVE MG/DL
PH UR STRIP: 5.5 [PH] (ref 4.6–8)
PROT UR QL STRIP: NEGATIVE
SP GR UR STRIP: 1.02 (ref 1–1.03)
UA UROBILINOGEN AMB POC: NORMAL (ref 0.2–1)
URINALYSIS CLARITY POC: CLEAR
URINALYSIS COLOR POC: YELLOW
URINE BLOOD POC: NEGATIVE
URINE LEUKOCYTES POC: NEGATIVE
URINE NITRITES POC: NEGATIVE

## 2021-10-06 PROCEDURE — 81003 URINALYSIS AUTO W/O SCOPE: CPT | Performed by: FAMILY MEDICINE

## 2021-10-06 PROCEDURE — 99213 OFFICE O/P EST LOW 20 MIN: CPT | Performed by: FAMILY MEDICINE

## 2021-10-06 PROCEDURE — 83036 HEMOGLOBIN GLYCOSYLATED A1C: CPT | Performed by: FAMILY MEDICINE

## 2021-10-06 PROCEDURE — 11750 EXCISION NAIL&NAIL MATRIX: CPT | Performed by: FAMILY MEDICINE

## 2021-10-06 RX ORDER — METFORMIN HYDROCHLORIDE 1000 MG/1
1000 TABLET ORAL 2 TIMES DAILY WITH MEALS
Qty: 180 TABLET | Refills: 3 | Status: SHIPPED | OUTPATIENT
Start: 2021-10-06 | End: 2021-10-11 | Stop reason: SDUPTHER

## 2021-10-06 RX ORDER — FLASH GLUCOSE SCANNING READER
EACH MISCELLANEOUS
Qty: 1 EACH | Refills: 2 | Status: SHIPPED | OUTPATIENT
Start: 2021-10-06 | End: 2022-01-21 | Stop reason: ALTCHOICE

## 2021-10-06 RX ORDER — FLASH GLUCOSE SENSOR
KIT MISCELLANEOUS
Qty: 1 KIT | Refills: 3 | Status: SHIPPED | OUTPATIENT
Start: 2021-10-06 | End: 2022-01-21 | Stop reason: ALTCHOICE

## 2021-10-06 RX ORDER — HYDROCODONE BITARTRATE AND ACETAMINOPHEN 5; 325 MG/1; MG/1
1 TABLET ORAL
Qty: 21 TABLET | Refills: 0 | Status: SHIPPED | OUTPATIENT
Start: 2021-10-06 | End: 2021-10-13

## 2021-10-06 RX ORDER — LIDOCAINE HYDROCHLORIDE 10 MG/ML
3 INJECTION INFILTRATION; PERINEURAL ONCE
Qty: 3 ML | Refills: 0
Start: 2021-10-06 | End: 2021-10-06

## 2021-10-06 RX ORDER — DOXYCYCLINE 100 MG/1
100 CAPSULE ORAL 2 TIMES DAILY
Qty: 20 CAPSULE | Refills: 0
Start: 2021-10-06 | End: 2021-10-16

## 2021-10-06 NOTE — LETTER
NOTIFICATION RETURN TO WORK / SCHOOL    10/6/2021 3:53 PM    Mr. Adriane Joe [facility] [id number]      To Whom It May Concern:    Tim Urrutia is currently under the care of Jossie [facility]. He will return to work/school on: 10/11/21    If there are questions or concerns please have the patient contact our office.         Sincerely,      Federico Vogt MD

## 2021-10-06 NOTE — LETTER
NOTIFICATION RETURN TO WORK / SCHOOL        10/6/2021 3:07 PM    Mr. Emil Joe [place]  [address] [id number]      To Whom It May Concern:    [place] Guillaume is currently under the care of [address] [place] [address] [facility]. He will return to work/school on: 10/7/2021      If there are questions or concerns please have the patient contact our office.         Sincerely,      Saloni Rubio MD

## 2021-10-06 NOTE — PROGRESS NOTES
Chief Complaint   Patient presents with    Urinary Frequency     1. Have you been to the ER, urgent care clinic since your last visit? Hospitalized since your last visit? Yes When: 10/6/21 [facility]  Reason for visit: urine frequency    2. Have you seen or consulted any other health care providers outside of the [address] [place] [address] since your last visit? Include any pap smears or colon screening. No    verified patient with two type of identifiers. [facility]21 for  c/o increased urine frequency x 2 weeks  unable to control at times. Also stated testicle has gotten larger.  Advised to follow up with pcp and get referral for urology    [facility] OFFICE-[facility]  OFFICE PROCEDURE PROGRESS NOTE        Chart reviewed for the following:   Nicki Joya LPN, have reviewed the History, Physical and updated the Allergic reactions for [address] [place] [address] Avenue performed immediately prior to start of procedure:   Nicki Joya LPN, have performed the following reviews on [address] [place] [address] prior to the start of the procedure:            * Patient was identified by name and date of birth   * Agreement on procedure being performed was verified  * Risks and Benefits explained to the patient  * Procedure site verified and marked as necessary  * Patient was positioned for comfort  * Consent was signed and verified     Time: 3:18pm      Date of procedure: 10/6/2021    Procedure performed by:  Tamy Amaya MD    Provider assisted by:   Vish Johnson LPN    Patient assisted by: self    How tolerated by patient: tolerated the procedure well with no complications    Post Procedural Pain Scale: 0 - No Hurt    Comments: none    TIME ENDED:3:30pm    PROCEDURE: left great toenail removal    PRE-PAIN:0    POST-PAIN:0    SPECIMEN SENT TO LAB: no

## 2021-10-06 NOTE — PROGRESS NOTES
HISTORY OF PRESENT ILLNESS  Patricia Edgar is a 49 y.o. male. Today's Covid Unvaccinated Pt main concerns were provided in the clinic,       pt has no fever no cough no dyspnea, no ha, not dizzy, nl smell nl taste, no N/V/D, has no orbital pain,  no body ache. today patient present for f/u regarding the diabetic state, who has not been compliancy w/ meds, has been only taking it once daily and ran out few wks ago,  who is also trying to have a less of starchy diabetic diet, and eat  diet, since last visit, patient has been more active  +++ Rf needed for today. patient  Currently denies tingling sensation, has ++polyurea and  ++polydipsia, last a1c was not at target of 9.5% %,      Testicular enlargement  The history is provided by the patient. This is a chronic problem. The current episode started more than few months ago. no burning sensation, no pain, abnl flow, ++ dribbling, +nocturia, No fhx of prostate, not seen the urologist, recently had done US which showed hydrocele, . Rash of the toe nail  Started few weeks ago not better tried alcohol washing and OTC antibiotic ointments, does   tingles and  pain full, states that is nexpanding red, and not  swelled up, whitish patches rounds, with itchiness                    Current Outpatient Medications   Medication Sig Dispense Refill    flash glucose sensor (FreeStyle Silvina 2 Sensor) kit Tid prn 1 Kit 3    flash glucose scanning reader (FreeStyle Silvina 2 [place]) misc Tid prn 1 Each 2    metFORMIN (GLUCOPHAGE) 1,000 mg tablet Take 1 Tablet by mouth two (2) times daily (with meals). 180 Tablet 3    HYDROcodone-acetaminophen (NORCO) 5-325 mg per tablet Take 1 Tablet by mouth every eight (8) hours as needed for Pain for up to 7 days. Max Daily Amount: 3 Tablets. 21 Tablet 0    hydrOXYchloroQUINE (PLAQUENIL) 200 mg tablet Take 2 Tablets by mouth daily.  60 Tablet 6    sildenafil citrate (VIAGRA) 100 mg tablet Take 1 Tablet by mouth as needed for Erectile Dysfunction. 12 Tablet 11    diclofenac EC (VOLTAREN) 75 mg EC tablet Take 1 tablets twice daily 60 Tablet 1    EnbreL SureClick 50 mg/mL (1 mL) injection INJECT 0.98 ML UNDER THE SKIN EVERY 7 DAYS 4 Each 1    chlorthalidone (HYGROTON) 25 mg tablet TAKE 1 TABLET BY MOUTH EVERY DAY 90 Tab 0    OTHER C-PAP MACHINE WHILE SLEEPING      nystatin (MYCOSTATIN) topical cream Apply  to affected area two (2) times a day. (Patient not taking: Reported on 7/29/2021) 30 g 3    potassium chloride (K-DUR, KLOR-CON) 20 mEq tablet Take 1 Tab by mouth daily. (Patient not taking: Reported on 7/29/2021) 30 Tab 3    ciclopirox (PENLAC) 8 % solution Apply 1 mL to affected area nightly. (Patient not taking: Reported on 7/29/2021) 1 Bottle 5    turmeric 400 mg cap Take  by mouth daily. (Patient not taking: Reported on 7/29/2021)      fish oil-omega-3 fatty acids (FISH OIL) 340-1,000 mg capsule Take 1 Cap by mouth daily. (Patient not taking: Reported on 7/29/2021)      cholecalciferol (VITAMIN D3) 1,000 unit tablet Take  by mouth daily. (Patient not taking: Reported on 7/29/2021)      cyanocobalamin (VITAMIN B-12) 500 mcg tablet Take 500 mcg by mouth daily. (Patient not taking: Reported on 1/98/5652)      folic acid (FOLVITE) 1 mg tablet TAKE 1 TABLET BY MOUTH DAILY (Patient not taking: Reported on 9/4/2020) 1 Tab 3    tacrolimus (PROTOPIC) 0.1 % ointment Apply  to affected area two (2) times a day. (Patient not taking: Reported on 7/29/2021) 30 g 6    aspirin delayed-release 81 mg tablet Take 1 Tab by mouth daily.  (Patient not taking: Reported on 8/26/2020) 30 Tab 11     No Known Allergies  Past Medical History:   Diagnosis Date    Cellulitis and abscess of trunk 7/31/2014    Chronic back pain greater than 3 months duration 7/31/2014    Diabetes ([address]     Family history of prostate cancer 7/31/2014    Hyperhydrosis disorder 7/31/2014    Hypertension     Intention tremor 12/9/2015    Prediabetes 6/25/2014    RA (rheumatoid arthritis) ([facility] 7/31/2014    Rash of perineum 6/25/2014    Tinea manus 1/19/2016    Tinea pedis of both feet 1/19/2016    Tobacco abuse 6/25/2014     Past Surgical History:   Procedure Laterality Date    HX ORTHOPAEDIC      right leg and knee     Family History   Problem Relation Age of Onset    Diabetes Mother     Hypertension Mother     Stroke Mother     Diabetes Father     Hypertension Father      Social History     Tobacco Use    Smoking status: Former Smoker     Packs/day: 1.00    Smokeless tobacco: Never Used   Substance Use Topics    Alcohol use: Yes     Comment: occasionally      Lab Results   Component Value Date/Time    Hemoglobin A1c 7.1 (H) 07/29/2021 12:24 PM    Hemoglobin A1c 6.3 (H) 08/27/2020 03:46 PM    Hemoglobin A1c 6.4 (H) 07/17/2019 03:38 PM    Glucose 183 (H) 07/29/2021 12:24 PM    Glucose (POC) 276 (H) 02/17/2020 11:09 AM    LDL, calculated 50.4 07/29/2021 12:24 PM    Creatinine 0.90 07/29/2021 12:24 PM      Lab Results   Component Value Date/Time    Cholesterol, total 151 07/29/2021 12:24 PM    HDL Cholesterol 83 07/29/2021 12:24 PM    LDL, calculated 50.4 07/29/2021 12:24 PM    Triglyceride 88 07/29/2021 12:24 PM    CHOL/HDL Ratio 1.8 07/29/2021 12:24 PM        Review of Systems   Constitutional: Negative for chills and fever. HENT: Negative for congestion and nosebleeds. Eyes: Negative for blurred vision and pain. Respiratory: Negative for cough, shortness of breath and wheezing. Cardiovascular: Negative for chest pain and leg swelling. Gastrointestinal: Negative for constipation, diarrhea, nausea and vomiting. Genitourinary: Positive for dysuria, frequency and urgency. Negative for flank pain and hematuria. Musculoskeletal: Positive for myalgias. Negative for joint pain. Skin: Positive for itching and rash. Neurological: Negative for dizziness, loss of consciousness and headaches. Psychiatric/Behavioral: Negative for depression.  The patient is not nervous/anxious and does not have insomnia. Physical Exam  Vitals and nursing note reviewed. Constitutional:       Appearance: He is well-developed. HENT:      Head: Normocephalic and atraumatic. Mouth/Throat:      Pharynx: No oropharyngeal exudate. Eyes:      Conjunctiva/sclera: Conjunctivae normal.      Pupils: Pupils are equal, round, and reactive to light. Neck:      Thyroid: No thyromegaly. Vascular: No JVD. Cardiovascular:      Rate and Rhythm: Normal rate and regular rhythm. Heart sounds: Normal heart sounds. No murmur heard. No friction rub. Pulmonary:      Effort: Pulmonary effort is normal. No respiratory distress. Breath sounds: Normal breath sounds. No wheezing or rales. Abdominal:      General: Bowel sounds are normal. There is no distension. Palpations: Abdomen is soft. Tenderness: There is no abdominal tenderness. Musculoskeletal:         General: No tenderness. Cervical back: Normal range of motion and neck supple. Lymphadenopathy:      Cervical: No cervical adenopathy. Skin:     General: Skin is warm. Findings: Erythema, lesion and rash present. Neurological:      Mental Status: He is alert and oriented to person, place, and time. Deep Tendon Reflexes: Reflexes are normal and symmetric. Psychiatric:         Behavior: Behavior normal.         ASSESSMENT and PLAN  Diagnoses and all orders for this visit:    1. Frequent urination  -     AMB POC HEMOGLOBIN A1C  -     AMB POC URINALYSIS DIP STICK AUTO W/O MICRO  -     REFERRAL TO UROLOGY  -     HYDROcodone-acetaminophen (NORCO) 5-325 mg per tablet; Take 1 Tablet by mouth every eight (8) hours as needed for Pain for up to 7 days. Max Daily Amount: 3 Tablets. 2. Type 2 diabetes mellitus with hyperglycemia, with long-term current use of insulin ([facility])  -     REFERRAL TO UROLOGY  -     metFORMIN (GLUCOPHAGE) 1,000 mg tablet;  Take 1 Tablet by mouth two (2) times daily (with meals). -     HYDROcodone-acetaminophen (NORCO) 5-325 mg per tablet; Take 1 Tablet by mouth every eight (8) hours as needed for Pain for up to 7 days. Max Daily Amount: 3 Tablets. 3. Ingrown toenail  -     doxycycline (MONODOX) 100 mg capsule; Take 1 Capsule by mouth two (2) times a day for 10 days. -     lidocaine (XYLOCAINE) 10 mg/mL (1 %) injection; 3 mL by IntraDERMal route once for 1 dose.  -     REMOVAL OF NAIL BED    4. Subungual hematoma of toenail of left foot, initial encounter  -     doxycycline (MONODOX) 100 mg capsule; Take 1 Capsule by mouth two (2) times a day for 10 days. -     lidocaine (XYLOCAINE) 10 mg/mL (1 %) injection; 3 mL by IntraDERMal route once for 1 dose.  -     REMOVAL OF NAIL BED    Other orders  -     flash glucose sensor (FreeStyle Silvina 2 Sensor) kit; Tid prn  -     flash glucose scanning reader (FreeStyle Silvina 2 [place]) misc; Tid prn          Subjective:    Diego Hernandez is a 49 y.o. male who presents for lesion removal. We have discussed this procedure, including option of not performing surgery, technique of surgery and potential for scarring at an earlier visit. Oubjective:   Patient appears well. Visit Vitals  /74 (BP 1 Location: Left upper arm, BP Patient Position: Sitting, BP Cuff Size: Adult)   Pulse 94   Resp 18   Ht 5' 7\" (1.702 m)   Wt 272 lb 4.8 oz (123.5 kg)   SpO2 100%   BMI 42.65 kg/m²     Skin:                    Assessment:   ingrown toenail    Procedure Note:   After informed consent was obtained, using betadine for cleansing and 1% lidocaine with epinephrine for anesthetic, with sterile technique, elliptical excision in total and wedge resection of ingrown nail was performed. Antibiotic dressing is applied, and wound care instructions provided. Be alert for any signs of cutaneous infection. The procedure was well tolerated without complications. Follow up: the patient  return in 3 days or prn.

## 2021-10-06 NOTE — PATIENT INSTRUCTIONS
Ingrown Toenail: Care Instructions  Your Care Instructions     An ingrown toenail often occurs because a nail is not trimmed correctly or because shoes are too tight. An ingrown nail can cause an infection. If your toe is infected, your doctor may prescribe antibiotics. Most ingrown toenails can be treated at home. You should trim toenails straight across, so the ends of the nail grow over the skin and not into it. Good nail care can prevent ingrown toenails. Follow-up care is a key part of your treatment and safety. Be sure to make and go to all appointments, and call your doctor if you are having problems. It's also a good idea to know your test results and keep a list of the medicines you take. How can you care for yourself at home? · Trim the nails straight across. Leave the corners a little longer so they do not cut into the skin. To do this when you have an ingrown nail:  ? Soak your foot in warm water for about 15 minutes to soften the nail. ? Wedge a small piece of wet cotton under the corner of the nail to cushion the nail and lift it slightly. This keeps it from cutting the skin. ? Repeat daily until the nail has grown out and can be trimmed. · Do not use manicure scissors to dig under the ingrown nail. You might stab your toe, which could get infected. · Do not trim your toenails too short. · Check with your doctor before trimming your own toenails if you have been diagnosed with diabetes or peripheral arterial disease. These conditions increase the risk of an infection, because you may have decreased sensation in your toes and cut yourself without knowing it. · Wear roomy, comfortable shoes. · If your doctor prescribed antibiotics, take them as directed. Do not stop taking them just because you feel better. You need to take the full course of antibiotics. When should you call for help?    Call your doctor now or seek immediate medical care if:    · You have signs of infection, such as:  ? Increased pain, swelling, warmth, or redness. ? Red streaks leading from the toe. ? Pus draining from the toe. ? A fever. Watch closely for changes in your health, and be sure to contact your doctor if:    · You do not get better as expected. Where can you learn more? Go to http://www.gray.com/  Enter R135 in the search box to learn more about \"Ingrown Toenail: Care Instructions. \"  Current as of: March 3, 2021               Content Version: 13.0  © 2006-2021 [employer]. Care instructions adapted under license by [employer] (which disclaims liability or warranty for this information). If you have questions about a medical condition or this instruction, always ask your healthcare professional. [address] any warranty or liability for your use of this information.

## 2021-10-10 DIAGNOSIS — G25.2 INTENTION TREMOR: ICD-10-CM

## 2021-10-10 DIAGNOSIS — Z72.0 TOBACCO ABUSE: ICD-10-CM

## 2021-10-10 DIAGNOSIS — I10 HTN, GOAL BELOW 130/80: ICD-10-CM

## 2021-10-10 DIAGNOSIS — R73.03 PREDIABETES: ICD-10-CM

## 2021-10-11 ENCOUNTER — OFFICE VISIT (OUTPATIENT)
Dept: FAMILY MEDICINE CLINIC | Age: 56
End: 2021-10-11
Payer: COMMERCIAL

## 2021-10-11 VITALS
WEIGHT: 272 LBS | DIASTOLIC BLOOD PRESSURE: 87 MMHG | OXYGEN SATURATION: 97 % | RESPIRATION RATE: 16 BRPM | HEART RATE: 68 BPM | SYSTOLIC BLOOD PRESSURE: 117 MMHG | HEIGHT: 67 IN | BODY MASS INDEX: 42.69 KG/M2

## 2021-10-11 DIAGNOSIS — B35.3 TINEA PEDIS OF BOTH FEET: ICD-10-CM

## 2021-10-11 DIAGNOSIS — G25.2 INTENTION TREMOR: ICD-10-CM

## 2021-10-11 DIAGNOSIS — E87.6 HYPOKALEMIA: ICD-10-CM

## 2021-10-11 DIAGNOSIS — Z71.89 ADVICE GIVEN ABOUT COVID-19 VIRUS INFECTION: ICD-10-CM

## 2021-10-11 DIAGNOSIS — R73.03 PREDIABETES: ICD-10-CM

## 2021-10-11 DIAGNOSIS — E53.8 B12 DEFICIENCY DUE TO DIET: ICD-10-CM

## 2021-10-11 DIAGNOSIS — Z79.4 TYPE 2 DIABETES MELLITUS WITH HYPERGLYCEMIA, WITH LONG-TERM CURRENT USE OF INSULIN (HCC): ICD-10-CM

## 2021-10-11 DIAGNOSIS — Z72.0 TOBACCO ABUSE: ICD-10-CM

## 2021-10-11 DIAGNOSIS — I10 HTN, GOAL BELOW 140/90: ICD-10-CM

## 2021-10-11 DIAGNOSIS — E11.65 TYPE 2 DIABETES MELLITUS WITH HYPERGLYCEMIA, WITH LONG-TERM CURRENT USE OF INSULIN (HCC): ICD-10-CM

## 2021-10-11 DIAGNOSIS — E11.65 UNCONTROLLED TYPE 2 DIABETES MELLITUS WITH HYPERGLYCEMIA (HCC): Primary | ICD-10-CM

## 2021-10-11 DIAGNOSIS — H92.03 OTALGIA OF BOTH EARS: ICD-10-CM

## 2021-10-11 DIAGNOSIS — I10 HTN, GOAL BELOW 130/80: ICD-10-CM

## 2021-10-11 PROCEDURE — 99213 OFFICE O/P EST LOW 20 MIN: CPT | Performed by: FAMILY MEDICINE

## 2021-10-11 RX ORDER — POTASSIUM CHLORIDE 20 MEQ/1
20 TABLET, EXTENDED RELEASE ORAL DAILY
Qty: 30 TABLET | Refills: 3 | Status: SHIPPED | OUTPATIENT
Start: 2021-10-11 | End: 2021-10-22 | Stop reason: SDUPTHER

## 2021-10-11 RX ORDER — METFORMIN HYDROCHLORIDE 1000 MG/1
1000 TABLET ORAL 2 TIMES DAILY WITH MEALS
Qty: 180 TABLET | Refills: 3
Start: 2021-10-11 | End: 2022-01-21 | Stop reason: SDUPTHER

## 2021-10-11 RX ORDER — CICLOPIROX OLAMINE 7.7 MG/G
CREAM TOPICAL 2 TIMES DAILY
Qty: 90 G | Refills: 3 | Status: SHIPPED | OUTPATIENT
Start: 2021-10-11

## 2021-10-11 RX ORDER — NEOMYCIN SULFATE, POLYMYXIN B SULFATE AND HYDROCORTISONE 10; 3.5; 1 MG/ML; MG/ML; [USP'U]/ML
3 SUSPENSION/ DROPS AURICULAR (OTIC) 4 TIMES DAILY
Qty: 10 ML | Refills: 0 | Status: SHIPPED | OUTPATIENT
Start: 2021-10-11 | End: 2022-01-21 | Stop reason: ALTCHOICE

## 2021-10-11 RX ORDER — CHLORTHALIDONE 25 MG/1
25 TABLET ORAL DAILY
Qty: 90 TABLET | Refills: 2 | Status: SHIPPED
Start: 2021-10-11 | End: 2022-01-07 | Stop reason: SDUPTHER

## 2021-10-11 NOTE — PROGRESS NOTES
HISTORY OF PRESENT ILLNESS  Amadou Wakefield is a 49 y.o. male, Today's Covid vaccinated Pt main concerns were provided in the clinic,    has no fever no cough no dyspnea, no ha, not dizzy, nl smell nl taste, no N/V/D, has no orbital pain,  no body ache. Pt present for f/u the patient was started on Abx high dose for the next 10 days,  also  was told to RTC in 10 days for the progress, or call if not better  DSNG,not been changed fortunately the infection site not all gone away at this time no drainage but skin is not red  and not tender to touch     today patient present for f/u regarding the diabetic state, who has been compliancy w/ meds since last visit,     patient has a home monitoring glucose device  usually averaging around 130 ,   Currently denies tingling sensation, has no polyurea and polydipsia, last a1c was not at target of 9.5% %,         Current Outpatient Medications   Medication Sig Dispense Refill    flash glucose sensor (FreeStyle Silvina 2 Sensor) kit Tid prn 1 Kit 3    flash glucose scanning reader (FreeStyle Silvina 2 [place]) misc Tid prn 1 Each 2    metFORMIN (GLUCOPHAGE) 1,000 mg tablet Take 1 Tablet by mouth two (2) times daily (with meals). 180 Tablet 3    HYDROcodone-acetaminophen (NORCO) 5-325 mg per tablet Take 1 Tablet by mouth every eight (8) hours as needed for Pain for up to 7 days. Max Daily Amount: 3 Tablets. 21 Tablet 0    doxycycline (MONODOX) 100 mg capsule Take 1 Capsule by mouth two (2) times a day for 10 days. 20 Capsule 0    hydrOXYchloroQUINE (PLAQUENIL) 200 mg tablet Take 2 Tablets by mouth daily. 60 Tablet 6    sildenafil citrate (VIAGRA) 100 mg tablet Take 1 Tablet by mouth as needed for Erectile Dysfunction.  12 Tablet 11    diclofenac EC (VOLTAREN) 75 mg EC tablet Take 1 tablets twice daily 60 Tablet 1    EnbreL SureClick 50 mg/mL (1 mL) injection INJECT 0.98 ML UNDER THE SKIN EVERY 7 DAYS 4 Each 1    chlorthalidone (HYGROTON) 25 mg tablet TAKE 1 TABLET BY MOUTH EVERY DAY 90 Tab 0    nystatin (MYCOSTATIN) topical cream Apply  to affected area two (2) times a day. (Patient not taking: Reported on 7/29/2021) 30 g 3    potassium chloride (K-DUR, KLOR-CON) 20 mEq tablet Take 1 Tab by mouth daily. (Patient not taking: Reported on 7/29/2021) 30 Tab 3    ciclopirox (PENLAC) 8 % solution Apply 1 mL to affected area nightly. (Patient not taking: Reported on 7/29/2021) 1 Bottle 5    OTHER C-PAP MACHINE WHILE SLEEPING      turmeric 400 mg cap Take  by mouth daily. (Patient not taking: Reported on 7/29/2021)      fish oil-omega-3 fatty acids (FISH OIL) 340-1,000 mg capsule Take 1 Cap by mouth daily. (Patient not taking: Reported on 7/29/2021)      cholecalciferol (VITAMIN D3) 1,000 unit tablet Take  by mouth daily. (Patient not taking: Reported on 7/29/2021)      cyanocobalamin (VITAMIN B-12) 500 mcg tablet Take 500 mcg by mouth daily. (Patient not taking: Reported on 2/31/1963)      folic acid (FOLVITE) 1 mg tablet TAKE 1 TABLET BY MOUTH DAILY (Patient not taking: Reported on 9/4/2020) 1 Tab 3    tacrolimus (PROTOPIC) 0.1 % ointment Apply  to affected area two (2) times a day. (Patient not taking: Reported on 7/29/2021) 30 g 6    aspirin delayed-release 81 mg tablet Take 1 Tab by mouth daily.  (Patient not taking: Reported on 8/26/2020) 30 Tab 11     No Known Allergies  Past Medical History:   Diagnosis Date    Cellulitis and abscess of trunk 7/31/2014    Chronic back pain greater than 3 months duration 7/31/2014    Diabetes ([facility]     Family history of prostate cancer 7/31/2014    Hyperhydrosis disorder 7/31/2014    Hypertension     Intention tremor 12/9/2015    Prediabetes 6/25/2014    RA (rheumatoid arthritis) ([facility] 7/31/2014    Rash of perineum 6/25/2014    Tinea manus 1/19/2016    Tinea pedis of both feet 1/19/2016    Tobacco abuse 6/25/2014     Past Surgical History:   Procedure Laterality Date    HX ORTHOPAEDIC      right leg and knee     Family History   Problem Relation Age of Onset    Diabetes Mother     Hypertension Mother     Stroke Mother     Diabetes Father     Hypertension Father      Social History     Tobacco Use    Smoking status: Former Smoker     Packs/day: 1.00    Smokeless tobacco: Never Used   Substance Use Topics    Alcohol use: Yes     Comment: occasionally      Lab Results   Component Value Date/Time    Hemoglobin A1c 7.1 (H) 07/29/2021 12:24 PM    Hemoglobin A1c 6.3 (H) 08/27/2020 03:46 PM    Hemoglobin A1c 6.4 (H) 07/17/2019 03:38 PM    Glucose 183 (H) 07/29/2021 12:24 PM    Glucose (POC) 276 (H) 02/17/2020 11:09 AM    LDL, calculated 50.4 07/29/2021 12:24 PM    Creatinine 0.90 07/29/2021 12:24 PM      Lab Results   Component Value Date/Time    Cholesterol, total 151 07/29/2021 12:24 PM    HDL Cholesterol 83 07/29/2021 12:24 PM    LDL, calculated 50.4 07/29/2021 12:24 PM    Triglyceride 88 07/29/2021 12:24 PM    CHOL/HDL Ratio 1.8 07/29/2021 12:24 PM        Review of Systems   Constitutional: Negative for chills and fever. HENT: Negative for congestion and nosebleeds. Eyes: Negative for blurred vision and pain. Respiratory: Negative for cough, shortness of breath and wheezing. Cardiovascular: Negative for chest pain and leg swelling. Gastrointestinal: Negative for constipation, diarrhea, nausea and vomiting. Genitourinary: Negative for dysuria and frequency. Musculoskeletal: Negative for joint pain and myalgias. Skin: Negative for itching and rash. Neurological: Negative for dizziness, loss of consciousness and headaches. Psychiatric/Behavioral: Negative for depression. The patient is not nervous/anxious and does not have insomnia. Physical Exam  Vitals and nursing note reviewed. Constitutional:       Appearance: He is well-developed. HENT:      Head: Normocephalic and atraumatic. Mouth/Throat:      Pharynx: No oropharyngeal exudate.    Eyes:      Conjunctiva/sclera: Conjunctivae normal. Pupils: Pupils are equal, round, and reactive to light. Neck:      Thyroid: No thyromegaly. Vascular: No JVD. Cardiovascular:      Rate and Rhythm: Normal rate and regular rhythm. Heart sounds: Normal heart sounds. No murmur heard. No friction rub. Pulmonary:      Effort: Pulmonary effort is normal. No respiratory distress. Breath sounds: Normal breath sounds. No wheezing or rales. Abdominal:      General: Bowel sounds are normal. There is no distension. Palpations: Abdomen is soft. Tenderness: There is no abdominal tenderness. Musculoskeletal:         General: No tenderness. Cervical back: Normal range of motion and neck supple. Lymphadenopathy:      Cervical: No cervical adenopathy. Skin:     General: Skin is warm. Findings: No erythema or rash. Neurological:      Mental Status: He is alert and oriented to person, place, and time. Deep Tendon Reflexes: Reflexes are normal and symmetric. Psychiatric:         Behavior: Behavior normal.         ASSESSMENT and PLAN  Diagnoses and all orders for this visit:    1. Uncontrolled type 2 diabetes mellitus with hyperglycemia ([address]    2. Tinea pedis of both feet    3. Advice given about COVID-19 virus infection    4. Prediabetes    5. HTN, goal below 130/80  -     chlorthalidone (HYGROTON) 25 mg tablet; Take 1 Tablet by mouth daily. 6. Tobacco abuse  -     chlorthalidone (HYGROTON) 25 mg tablet; Take 1 Tablet by mouth daily. 7. Intention tremor    8. Otalgia of both ears  -     neomycin-polymyxin-hydrocortisone, buffered, (PEDIOTIC) 3.5-10,000-1 mg/mL-unit/mL-% otic suspension; Administer 3 Drops in left ear four (4) times daily. Indications: outer ear inflammation due to allergy or infection    9. Type 2 diabetes mellitus with hyperglycemia, with long-term current use of insulin ([facility])  -     metFORMIN (GLUCOPHAGE) 1,000 mg tablet; Take 1 Tablet by mouth two (2) times daily (with meals).   -     potassium chloride (K-DUR, KLOR-CON) 20 mEq tablet; Take 1 Tablet by mouth daily. Twice weekly    10. HTN, goal below 140/90  -     potassium chloride (K-DUR, KLOR-CON) 20 mEq tablet; Take 1 Tablet by mouth daily. Twice weekly    11. Hypokalemia  -     potassium chloride (K-DUR, KLOR-CON) 20 mEq tablet; Take 1 Tablet by mouth daily. Twice weekly    12. B12 deficiency due to diet  -     potassium chloride (K-DUR, KLOR-CON) 20 mEq tablet; Take 1 Tablet by mouth daily. Twice weekly    Other orders  -     ciclopirox (LOPROX) 0.77 % topical cream; Apply  to affected area two (2) times a day.            Concern reza COVID-19 addressed and detailed, pt was told that the best way to prevent illness is by protection with vaccination, and to Wear a facemask , having social distance, and to get tested if possible,   Pt was also told if develop dyspnea needs to call 911 or go to er, call for furankur advise, pt agreed with todays recommendations,

## 2021-10-11 NOTE — PATIENT INSTRUCTIONS
Diabetes Foot Health: Care Instructions  Your Care Instructions     When you have diabetes, your feet need extra care and attention. Diabetes can damage the nerve endings and blood vessels in your feet, making you less likely to notice when your feet are injured. Diabetes also limits your body's ability to fight infection and get blood to areas that need it. If you get a minor foot injury, it could become an ulcer or a serious infection. With good foot care, you can prevent most of these problems. Caring for your feet can be quick and easy. Most of the care can be done when you are bathing or getting ready for bed. Follow-up care is a key part of your treatment and safety. Be sure to make and go to all appointments, and call your doctor if you are having problems. It's also a good idea to know your test results and keep a list of the medicines you take. How can you care for yourself at home? · Keep your blood sugar close to normal by watching what and how much you eat, monitoring blood sugar, taking medicines if prescribed, and getting regular exercise. · Do not smoke. Smoking affects blood flow and can make foot problems worse. If you need help quitting, talk to your doctor about stop-smoking programs and medicines. These can increase your chances of quitting for good. · Eat a diet that is low in fats. High fat intake can cause fat to build up in your blood vessels and decrease blood flow. · Inspect your feet daily for blisters, cuts, cracks, or sores. If you cannot see well, use a mirror or have someone help you. · Take care of your feet:  ? Wash your feet every day. Use warm (not hot) water. Check the water temperature with your wrists or other part of your body, not your feet. ? Dry your feet well. Pat them dry. Do not rub the skin on your feet too hard. Dry well between your toes. If the skin on your feet stays moist, bacteria or a fungus can grow, which can lead to infection. ?  Keep your skin soft. Use moisturizing skin cream to keep the skin on your feet soft and prevent calluses and cracks. But do not put the cream between your toes, and stop using any cream that causes a rash. ? Clean underneath your toenails carefully. Do not use a sharp object to clean underneath your toenails. Use the blunt end of a nail file or other rounded tool. ? Trim and file your toenails straight across to prevent ingrown toenails. Use a nail clipper, not scissors. Use an emery board to smooth the edges. · Change socks daily. Socks without seams are best, because seams often rub the feet. You can find socks for people with diabetes from specialty catalogs. · Look inside your shoes every day for things like gravel or torn linings, which could cause blisters or sores. · Buy shoes that fit well:  ? Look for shoes that have plenty of space around the toes. This helps prevent bunions and blisters. ? Try on shoes while wearing the kind of socks you will usually wear with the shoes. ? Avoid plastic shoes. They may rub your feet and cause blisters. Good shoes should be made of materials that are flexible and breathable, such as leather or cloth. ? Break in new shoes slowly by wearing them for no more than an hour a day for several days. Take extra time to check your feet for red areas, blisters, or other problems after you wear new shoes. · Do not go barefoot. Do not wear sandals, and do not wear shoes with very thin soles. Thin soles are easy to puncture. They also do not protect your feet from hot pavement or cold weather. · Have your doctor check your feet during each visit. If you have a foot problem, see your doctor. Do not try to treat an early foot problem at home. Home remedies or treatments that you can buy without a prescription (such as corn removers) can be harmful. · Always get early treatment for foot problems. A minor irritation can lead to a major problem if not properly cared for early.   When should you call for help? Call your doctor now or seek immediate medical care if:    · You have a foot sore, an ulcer or break in the skin that is not healing after 4 days, bleeding corns or calluses, or an ingrown toenail.     · You have blue or black areas, which can mean bruising or blood flow problems.     · You have peeling skin or tiny blisters between your toes or cracking or oozing of the skin.     · You have a fever for more than 24 hours and a foot sore.     · You have new numbness or tingling in your feet that does not go away after you move your feet or change positions.     · You have unexplained or unusual swelling of the foot or ankle. Watch closely for changes in your health, and be sure to contact your doctor if:    · You cannot do proper foot care. Where can you learn more? Go to http://www.gray.com/  Enter A739 in the search box to learn more about \"Diabetes Foot Health: Care Instructions. \"  Current as of: August 31, 2020               Content Version: 13.0  © 2006-2021 [employer]. Care instructions adapted under license by [employer] (which disclaims liability or warranty for this information). If you have questions about a medical condition or this instruction, always ask your healthcare professional. [address] any warranty or liability for your use of this information.

## 2021-10-11 NOTE — PROGRESS NOTES
Chief Complaint   Patient presents with    Wound Check     1. Have you been to the ER, urgent care clinic since your last visit? Hospitalized since your last visit? No    2. Have you seen or consulted any other health care providers outside of the [address] [place] [address] since your last visit? Include any pap smears or colon screening. No    Verified patient with two type of identifiers.    Dressing to left foot intact- minimal dried blood to toe area  C/o muffled hearing, denies pain, requesting ear cleaning   Also c/o left elbow pain

## 2021-10-12 RX ORDER — CHLORTHALIDONE 25 MG/1
TABLET ORAL
Qty: 90 TABLET | Refills: 0 | Status: SHIPPED | OUTPATIENT
Start: 2021-10-12 | End: 2022-01-21 | Stop reason: ALTCHOICE

## 2021-10-19 ENCOUNTER — DOCUMENTATION ONLY (OUTPATIENT)
Dept: FAMILY MEDICINE CLINIC | Age: 56
End: 2021-10-19

## 2021-10-22 DIAGNOSIS — Z79.4 TYPE 2 DIABETES MELLITUS WITH HYPERGLYCEMIA, WITH LONG-TERM CURRENT USE OF INSULIN (HCC): ICD-10-CM

## 2021-10-22 DIAGNOSIS — E11.65 TYPE 2 DIABETES MELLITUS WITH HYPERGLYCEMIA, WITH LONG-TERM CURRENT USE OF INSULIN (HCC): ICD-10-CM

## 2021-10-22 DIAGNOSIS — I10 HTN, GOAL BELOW 140/90: ICD-10-CM

## 2021-10-22 DIAGNOSIS — E53.8 B12 DEFICIENCY DUE TO DIET: ICD-10-CM

## 2021-10-22 DIAGNOSIS — E87.6 HYPOKALEMIA: ICD-10-CM

## 2021-10-25 RX ORDER — POTASSIUM CHLORIDE 20 MEQ/1
TABLET, EXTENDED RELEASE ORAL
Qty: 30 TABLET | Refills: 3 | Status: SHIPPED | OUTPATIENT
Start: 2021-10-25 | End: 2022-01-21 | Stop reason: ALTCHOICE

## 2021-11-08 RX ORDER — DOXYLAMINE SUCCINATE 25 MG
TABLET ORAL 2 TIMES DAILY
Qty: 57 G | Refills: 2 | Status: SHIPPED | OUTPATIENT
Start: 2021-11-08 | End: 2022-01-21 | Stop reason: ALTCHOICE

## 2021-11-16 RX ORDER — ETANERCEPT 50 MG/ML
SOLUTION SUBCUTANEOUS
Qty: 4 ML | Refills: 3 | Status: SHIPPED | OUTPATIENT
Start: 2021-11-16 | End: 2022-09-13 | Stop reason: ALTCHOICE

## 2022-01-07 ENCOUNTER — VIRTUAL VISIT (OUTPATIENT)
Dept: FAMILY MEDICINE CLINIC | Age: 57
End: 2022-01-07
Payer: COMMERCIAL

## 2022-01-07 DIAGNOSIS — Z71.89 ADVICE GIVEN ABOUT COVID-19 VIRUS INFECTION: Primary | ICD-10-CM

## 2022-01-07 DIAGNOSIS — I10 HTN, GOAL BELOW 140/90: ICD-10-CM

## 2022-01-07 DIAGNOSIS — Z79.4 TYPE 2 DIABETES MELLITUS WITH HYPERGLYCEMIA, WITH LONG-TERM CURRENT USE OF INSULIN (HCC): ICD-10-CM

## 2022-01-07 DIAGNOSIS — E11.65 TYPE 2 DIABETES MELLITUS WITH HYPERGLYCEMIA, WITH LONG-TERM CURRENT USE OF INSULIN (HCC): ICD-10-CM

## 2022-01-07 PROCEDURE — 99213 OFFICE O/P EST LOW 20 MIN: CPT | Performed by: FAMILY MEDICINE

## 2022-01-07 RX ORDER — LISINOPRIL 20 MG/1
20 TABLET ORAL DAILY
Qty: 30 TABLET | Refills: 6 | Status: SHIPPED | OUTPATIENT
Start: 2022-01-07 | End: 2022-01-21 | Stop reason: SDUPTHER

## 2022-01-07 RX ORDER — DICLOFENAC SODIUM 75 MG/1
TABLET, DELAYED RELEASE ORAL
Qty: 60 TABLET | Refills: 1 | Status: SHIPPED | OUTPATIENT
Start: 2022-01-07 | End: 2022-04-20

## 2022-01-07 NOTE — PROGRESS NOTES
HISTORY OF PRESENT ILLNESS  Bethel iPckard is a 49 y.o. male. Pt's main concerns were provided on virtual video format visit, a telemed format, COVID-19 vaccinated pt is w/ comorbid medical history and unaware of been exposed to covid-19 individual, Pt Have been trying to stay safe,      Patient states that he has been reading side effect regarding medications and he did not like the chlorthalidone side effect profile he likes to be changed also he states that last visit his A1c was elevated because he was off medtformin for w ahile with the last Hemoglobin A1c (POC) % 9.5, since the last visit pt has been compliant with meds, no tingling no polyuria no polydipsia,    Wt 264 lb    pt has no fever no cough no dyspnea, no ha, not dizzy, nl smell nl taste, no N/V/D, no body achept has no fever no cough no dyspnea, no ha, not dizzy, nl smell nl taste, no N/V/D, no body ache      Current Outpatient Medications   Medication Sig Dispense Refill    EnbreL SureClick 50 mg/mL (1 mL) injection INJECT 1 PEN (50MG) UNDER THE SKIN EVERY 7 DAYS 4 mL 3    potassium chloride (K-DUR, KLOR-CON) 20 mEq tablet Take one tablet by mouth Twice weekly 30 Tablet 3    chlorthalidone (HYGROTON) 25 mg tablet TAKE 1 TABLET BY MOUTH EVERY DAY 90 Tablet 0    metFORMIN (GLUCOPHAGE) 1,000 mg tablet Take 1 Tablet by mouth two (2) times daily (with meals). 180 Tablet 3    hydrOXYchloroQUINE (PLAQUENIL) 200 mg tablet Take 2 Tablets by mouth daily. 60 Tablet 6    sildenafil citrate (VIAGRA) 100 mg tablet Take 1 Tablet by mouth as needed for Erectile Dysfunction. 12 Tablet 11    diclofenac EC (VOLTAREN) 75 mg EC tablet Take 1 tablets twice daily 60 Tablet 1    OTHER C-PAP MACHINE WHILE SLEEPING      miconazole (MICOTIN) 2 % topical cream Apply  to affected area two (2) times a day. (Patient taking differently: Apply  to affected area two (2) times a day.  As needed) 57 g 2    neomycin-polymyxin-hydrocortisone, buffered, (PEDIOTIC) 3.5-10,000-1 mg/mL-unit/mL-% otic suspension Administer 3 Drops in left ear four (4) times daily. Indications: outer ear inflammation due to allergy or infection (Patient not taking: Reported on 1/7/2022) 10 mL 0    ciclopirox (LOPROX) 0.77 % topical cream Apply  to affected area two (2) times a day. (Patient taking differently: Apply  to affected area two (2) times a day. As needed) 90 g 3    flash glucose sensor (FreeStyle Silvina 2 Sensor) kit Tid prn (Patient not taking: Reported on 10/11/2021) 1 Kit 3    flash glucose scanning reader (FreeStyle Silvina 2 [place]) misc Tid prn (Patient not taking: Reported on 10/11/2021) 1 Each 2    fish oil-omega-3 fatty acids (FISH OIL) 340-1,000 mg capsule Take 1 Cap by mouth daily. (Patient not taking: Reported on 7/29/2021)      cholecalciferol (VITAMIN D3) 1,000 unit tablet Take  by mouth daily. (Patient not taking: Reported on 5/21/4068)      folic acid (FOLVITE) 1 mg tablet TAKE 1 TABLET BY MOUTH DAILY (Patient not taking: Reported on 9/4/2020) 1 Tab 3    aspirin delayed-release 81 mg tablet Take 1 Tab by mouth daily.  (Patient not taking: Reported on 8/26/2020) 30 Tab 11     No Known Allergies  Past Medical History:   Diagnosis Date    Cellulitis and abscess of trunk 7/31/2014    Chronic back pain greater than 3 months duration 7/31/2014    Diabetes ([address]     Family history of prostate cancer 7/31/2014    Hyperhydrosis disorder 7/31/2014    Hypertension     Intention tremor 12/9/2015    Prediabetes 6/25/2014    RA (rheumatoid arthritis) ([address] 7/31/2014    Rash of perineum 6/25/2014    Tinea manus 1/19/2016    Tinea pedis of both feet 1/19/2016    Tobacco abuse 6/25/2014     Past Surgical History:   Procedure Laterality Date    HX ORTHOPAEDIC      right leg and knee     Family History   Problem Relation Age of Onset    Diabetes Mother     Hypertension Mother     Stroke Mother     Diabetes Father     Hypertension Father      Social History Tobacco Use    Smoking status: Former Smoker     Packs/day: 1.00    Smokeless tobacco: Never Used   Substance Use Topics    Alcohol use: Yes     Comment: occasionally      Lab Results   Component Value Date/Time    Hemoglobin A1c 7.1 (H) 07/29/2021 12:24 PM    Hemoglobin A1c 6.3 (H) 08/27/2020 03:46 PM    Hemoglobin A1c 6.4 (H) 07/17/2019 03:38 PM    Glucose 183 (H) 07/29/2021 12:24 PM    Glucose (POC) 276 (H) 02/17/2020 11:09 AM    LDL, calculated 50.4 07/29/2021 12:24 PM    Creatinine 0.90 07/29/2021 12:24 PM      Lab Results   Component Value Date/Time    Cholesterol, total 151 07/29/2021 12:24 PM    HDL Cholesterol 83 07/29/2021 12:24 PM    LDL, calculated 50.4 07/29/2021 12:24 PM    Triglyceride 88 07/29/2021 12:24 PM    CHOL/HDL Ratio 1.8 07/29/2021 12:24 PM        Review of Systems   Constitutional: Negative for chills and fever. HENT: Negative for congestion and nosebleeds. Eyes: Negative for blurred vision and pain. Respiratory: Negative for cough, shortness of breath and wheezing. Cardiovascular: Negative for chest pain and leg swelling. Gastrointestinal: Negative for constipation, diarrhea, nausea and vomiting. Genitourinary: Negative for dysuria and frequency. Musculoskeletal: Negative for joint pain and myalgias. Skin: Negative for itching and rash. Neurological: Negative for dizziness, loss of consciousness and headaches. Psychiatric/Behavioral: Negative for depression. The patient is not nervous/anxious and does not have insomnia. Physical Exam  Constitutional:       Appearance: Normal appearance. HENT:      Head: Normocephalic and atraumatic. Nose: Nose normal. No congestion. Neurological:      Mental Status: He is alert and oriented to person, place, and time. Psychiatric:         Mood and Affect: Mood normal.         Behavior: Behavior normal.         Thought Content:  Thought content normal.         Judgment: Judgment normal.         ASSESSMENT and PLAN  Diagnoses and all orders for this visit:    1. Advice given about COVID-19 virus infection    2. Type 2 diabetes mellitus with hyperglycemia, with long-term current use of insulin (HCC)  -     diclofenac EC (VOLTAREN) 75 mg EC tablet; Take 1 tablets twice daily  -     lisinopriL (PRINIVIL, ZESTRIL) 20 mg tablet; Take 1 Tablet by mouth daily. 3. HTN, goal below 140/90  -     diclofenac EC (VOLTAREN) 75 mg EC tablet; Take 1 tablets twice daily  -     lisinopriL (PRINIVIL, ZESTRIL) 20 mg tablet; Take 1 Tablet by mouth daily. Patient advised to have the VAX and the mask on most of the time, social distance and handwashing avoid crowded area pursuant to the emergency declaration under the [address] and [place] [address] [facility] authority and the [employer] Act, this Virtual Visit was conducted, with patient's consent, to reduce the patient's risk of exposure to COVID-19 and provide continuity of care for an established patient  Services were provided through a Video synchronous discussion virtually to substitute for in-person appointment.

## 2022-01-07 NOTE — PROGRESS NOTES
Chief Complaint   Patient presents with    Hypertension     1. Have you been to the ER, urgent care clinic since your last visit? Hospitalized since your last visit? No    2. Have you seen or consulted any other health care providers outside of the [address] [place] [address] since your last visit? Include any pap smears or colon screening. No    Call placed to pt. Verified patient with two type of identifiers.   requesting to change chlorthalidone due to causing Erectile dysfunction

## 2022-01-21 ENCOUNTER — VIRTUAL VISIT (OUTPATIENT)
Dept: FAMILY MEDICINE CLINIC | Age: 57
End: 2022-01-21
Payer: COMMERCIAL

## 2022-01-21 DIAGNOSIS — M45.9 RHEUMATOID ARTHRITIS INVOLVING VERTEBRA, UNSPECIFIED WHETHER RHEUMATOID FACTOR PRESENT (HCC): ICD-10-CM

## 2022-01-21 DIAGNOSIS — Z71.89 ADVICE GIVEN ABOUT COVID-19 VIRUS INFECTION: ICD-10-CM

## 2022-01-21 DIAGNOSIS — Z79.4 TYPE 2 DIABETES MELLITUS WITH HYPERGLYCEMIA, WITH LONG-TERM CURRENT USE OF INSULIN (HCC): ICD-10-CM

## 2022-01-21 DIAGNOSIS — E11.65 UNCONTROLLED TYPE 2 DIABETES MELLITUS WITH HYPERGLYCEMIA (HCC): ICD-10-CM

## 2022-01-21 DIAGNOSIS — E11.65 TYPE 2 DIABETES MELLITUS WITH HYPERGLYCEMIA, WITH LONG-TERM CURRENT USE OF INSULIN (HCC): ICD-10-CM

## 2022-01-21 DIAGNOSIS — R73.03 PREDIABETES: ICD-10-CM

## 2022-01-21 DIAGNOSIS — I10 HTN, GOAL BELOW 140/90: Primary | ICD-10-CM

## 2022-01-21 PROCEDURE — 99214 OFFICE O/P EST MOD 30 MIN: CPT | Performed by: FAMILY MEDICINE

## 2022-01-21 RX ORDER — LISINOPRIL 20 MG/1
20 TABLET ORAL DAILY
Qty: 30 TABLET | Refills: 6
Start: 2022-01-21 | End: 2022-05-31

## 2022-01-21 RX ORDER — ROSUVASTATIN CALCIUM 10 MG/1
10 TABLET, COATED ORAL
Qty: 30 TABLET | Refills: 3 | Status: SHIPPED | OUTPATIENT
Start: 2022-01-21

## 2022-01-21 RX ORDER — METFORMIN HYDROCHLORIDE 1000 MG/1
1000 TABLET ORAL 2 TIMES DAILY WITH MEALS
Qty: 180 TABLET | Refills: 3
Start: 2022-01-21

## 2022-01-21 NOTE — PROGRESS NOTES
HISTORY OF PRESENT ILLNESS  Nickolas Gallardo is a 49 y.o. male. Pt's main concerns were provided on virtual video format visit, a telemed format, COVID-19 unvaccinated pt is w/ comorbid medical history and unaware of been exposed to covid-19 individual, Pt Have been trying to stay safe  ,   Today pt also present for Bp check, for which the pt has been a compliant w/ the bp meds, in addition pt trying to the best to have a low salt diet a   pt sometimes obtain the bp with the average of  <140/90,    pt denies  Having Chest Pain, has no legs swelling and not feeling lightheadedness,      today patient present for f/u regarding the diabetic state, who has been compliancy w/ meds, and is trying to have a diabetic diet, and p     does a home monitoring glucoseusually averaging around  , +++ Rf needed for today, patient currently denies tingling sensation, has no polyurea and polydipsia, last a1c was not at target  % %, Last urine microalbumin 2021 and was normal, patient currently on ACE inhibitor. Component Ref Range & Units 10/6/21 1440 8/10/16 1640   Hemoglobin A1c (POC) % 9.5  5.8      Component Ref Range & Units 8/27/20 1546 7/17/19 1538 1/19/16 1226 6/25/14 1219   Hemoglobin A1c 4.8 - 5.6 % 6.3 High   6.4 High  CM  5.5 CM  5.7 High  CM    Comment:          Prediabetes: 5.7 - 6.4          Current Outpatient Medications   Medication Sig Dispense Refill    diclofenac EC (VOLTAREN) 75 mg EC tablet Take 1 tablets twice daily 60 Tablet 1    lisinopriL (PRINIVIL, ZESTRIL) 20 mg tablet Take 1 Tablet by mouth daily. 30 Tablet 6    EnbreL SureClick 50 mg/mL (1 mL) injection INJECT 1 PEN (50MG) UNDER THE SKIN EVERY 7 DAYS 4 mL 3    neomycin-polymyxin-hydrocortisone, buffered, (PEDIOTIC) 3.5-10,000-1 mg/mL-unit/mL-% otic suspension Administer 3 Drops in left ear four (4) times daily.  Indications: outer ear inflammation due to allergy or infection (Patient taking differently: Administer 3 Drops in left ear four (4) times daily. As needed  Indications: outer ear inflammation due to allergy or infection) 10 mL 0    ciclopirox (LOPROX) 0.77 % topical cream Apply  to affected area two (2) times a day. (Patient taking differently: Apply  to affected area two (2) times a day. As needed) 90 g 3    metFORMIN (GLUCOPHAGE) 1,000 mg tablet Take 1 Tablet by mouth two (2) times daily (with meals). 180 Tablet 3    hydrOXYchloroQUINE (PLAQUENIL) 200 mg tablet Take 2 Tablets by mouth daily. 60 Tablet 6    sildenafil citrate (VIAGRA) 100 mg tablet Take 1 Tablet by mouth as needed for Erectile Dysfunction. 12 Tablet 11    OTHER C-PAP MACHINE WHILE SLEEPING      miconazole (MICOTIN) 2 % topical cream Apply  to affected area two (2) times a day. (Patient not taking: Reported on 1/21/2022) 57 g 2    potassium chloride (K-DUR, KLOR-CON) 20 mEq tablet Take one tablet by mouth Twice weekly (Patient not taking: Reported on 1/21/2022) 30 Tablet 3    chlorthalidone (HYGROTON) 25 mg tablet TAKE 1 TABLET BY MOUTH EVERY DAY (Patient not taking: Reported on 1/21/2022) 90 Tablet 0    flash glucose sensor (FreeStyle Silvina 2 Sensor) kit Tid prn (Patient not taking: Reported on 10/11/2021) 1 Kit 3    flash glucose scanning reader (FreeStyle Silvina 2 [place]) misc Tid prn (Patient not taking: Reported on 10/11/2021) 1 Each 2    fish oil-omega-3 fatty acids (FISH OIL) 340-1,000 mg capsule Take 1 Cap by mouth daily. (Patient not taking: Reported on 7/29/2021)      cholecalciferol (VITAMIN D3) 1,000 unit tablet Take  by mouth daily. (Patient not taking: Reported on 9/87/3008)      folic acid (FOLVITE) 1 mg tablet TAKE 1 TABLET BY MOUTH DAILY (Patient not taking: Reported on 9/4/2020) 1 Tab 3    aspirin delayed-release 81 mg tablet Take 1 Tab by mouth daily.  (Patient not taking: Reported on 8/26/2020) 30 Tab 11     No Known Allergies  Past Medical History:   Diagnosis Date    Cellulitis and abscess of trunk 7/31/2014    Chronic back pain greater than 3 months duration 7/31/2014    [facility]     Family history of prostate cancer 7/31/2014    Hyperhydrosis disorder 7/31/2014    Hypertension     Intention tremor 12/9/2015    Prediabetes 6/25/2014    RA [facility]rheumatoid arthritis) ([facility] 7/31/2014    Rash of perineum 6/25/2014    Tinea manus 1/19/2016    Tinea pedis of both feet 1/19/2016    Tobacco abuse 6/25/2014     Past Surgical History:   Procedure Laterality Date    HX ORTHOPAEDIC      right leg and knee     Family History   Problem Relation Age of Onset    Diabetes Mother     Hypertension Mother     Stroke Mother     Diabetes Father     Hypertension Father      Social History     Tobacco Use    Smoking status: Former Smoker     Packs/day: 1.00    Smokeless tobacco: Never Used   Substance Use Topics    Alcohol use: Yes     Comment: occasionally      Lab Results   Component Value Date/Time    Hemoglobin A1c 7.1 (H) 07/29/2021 12:24 PM    Hemoglobin A1c 6.3 (H) 08/27/2020 03:46 PM    Hemoglobin A1c 6.4 (H) 07/17/2019 03:38 PM    Glucose 183 (H) 07/29/2021 12:24 PM    Glucose (POC) 276 (H) 02/17/2020 11:09 AM    LDL, calculated 50.4 07/29/2021 12:24 PM    Creatinine 0.90 07/29/2021 12:24 PM      Lab Results   Component Value Date/Time    Cholesterol, total 151 07/29/2021 12:24 PM    HDL Cholesterol 83 07/29/2021 12:24 PM    LDL, calculated 50.4 07/29/2021 12:24 PM    Triglyceride 88 07/29/2021 12:24 PM    CHOL/HDL Ratio 1.8 07/29/2021 12:24 PM     Lab Results   Component Value Date/Time    ALT (SGPT) 32 07/29/2021 12:24 PM    Alk. phosphatase 108 07/29/2021 12:24 PM    Bilirubin, total 0.4 07/29/2021 12:24 PM    Albumin 3.8 07/29/2021 12:24 PM    Protein, total 7.5 07/29/2021 12:24 PM    PLATELET 670 19/05/4076 12:24 PM        Review of Systems   Constitutional: Negative for chills and fever. HENT: Negative for congestion and nosebleeds. Eyes: Negative for blurred vision and pain.    Respiratory: Negative for cough, shortness of breath and wheezing. Cardiovascular: Negative for chest pain and leg swelling. Gastrointestinal: Negative for constipation, diarrhea, nausea and vomiting. Genitourinary: Negative for dysuria and frequency. Musculoskeletal: Negative for joint pain and myalgias. Skin: Negative for itching and rash. Neurological: Negative for dizziness, loss of consciousness and headaches. Psychiatric/Behavioral: Negative for depression. The patient is not nervous/anxious and does not have insomnia. Physical Exam  Constitutional:       Appearance: Normal appearance. HENT:      Head: Normocephalic and atraumatic. Nose: Nose normal. No congestion. Neurological:      Mental Status: He is alert and oriented to person, place, and time. Psychiatric:         Mood and Affect: Mood normal.         Behavior: Behavior normal.         Thought Content: Thought content normal.         Judgment: Judgment normal.         ASSESSMENT and PLAN  Diagnoses and all orders for this visit:    1. HTN, goal below 140/90  -     lisinopriL (PRINIVIL, ZESTRIL) 20 mg tablet; Take 1 Tablet by mouth daily. -     metFORMIN (GLUCOPHAGE) 1,000 mg tablet; Take 1 Tablet by mouth two (2) times daily (with meals). -     rosuvastatin (CRESTOR) 10 mg tablet; Take 1 Tablet by mouth nightly. 2. Advice given about COVID-19 virus infection  -     lisinopriL (PRINIVIL, ZESTRIL) 20 mg tablet; Take 1 Tablet by mouth daily. -     metFORMIN (GLUCOPHAGE) 1,000 mg tablet; Take 1 Tablet by mouth two (2) times daily (with meals). -     rosuvastatin (CRESTOR) 10 mg tablet; Take 1 Tablet by mouth nightly. 3. Prediabetes  -     lisinopriL (PRINIVIL, ZESTRIL) 20 mg tablet; Take 1 Tablet by mouth daily. -     metFORMIN (GLUCOPHAGE) 1,000 mg tablet; Take 1 Tablet by mouth two (2) times daily (with meals). -     rosuvastatin (CRESTOR) 10 mg tablet; Take 1 Tablet by mouth nightly.     4. Uncontrolled type 2 diabetes mellitus with hyperglycemia (HCC)  -     lisinopriL (PRINIVIL, ZESTRIL) 20 mg tablet; Take 1 Tablet by mouth daily. -     metFORMIN (GLUCOPHAGE) 1,000 mg tablet; Take 1 Tablet by mouth two (2) times daily (with meals). -     rosuvastatin (CRESTOR) 10 mg tablet; Take 1 Tablet by mouth nightly. 5. Rheumatoid arthritis involving vertebra, unspecified whether rheumatoid factor present ([facility])  -     lisinopriL (PRINIVIL, ZESTRIL) 20 mg tablet; Take 1 Tablet by mouth daily. -     metFORMIN (GLUCOPHAGE) 1,000 mg tablet; Take 1 Tablet by mouth two (2) times daily (with meals). -     rosuvastatin (CRESTOR) 10 mg tablet; Take 1 Tablet by mouth nightly. 6. Type 2 diabetes mellitus with hyperglycemia, with long-term current use of insulin ([facility])  -     lisinopriL (PRINIVIL, ZESTRIL) 20 mg tablet; Take 1 Tablet by mouth daily. -     metFORMIN (GLUCOPHAGE) 1,000 mg tablet; Take 1 Tablet by mouth two (2) times daily (with meals). -     rosuvastatin (CRESTOR) 10 mg tablet; Take 1 Tablet by mouth nightly. diabetic letter has been given for this diet no change at this time,   Needs lab testing in 2 wks     Today's Covid vaccinated Pt main concerns were provided in the clinic,    pt is w/ comorbid history and   Pt has been trying to wear mask most of the times,  pt has no fever no cough no dyspnea, no ha, not dizzy, nl smell nl taste, no N/V/D, has no orbital pain,  no body ache.

## 2022-01-21 NOTE — PROGRESS NOTES
VV-Pt is aware of billable appt depending on insurance plan. Preferred number [phone]    Pt verbally agrees for labs, orders, and others to be mailed to confirmed home address. Identified pt with two pt identifiers(name and ). Reviewed record in preparation for visit and have obtained necessary documentation. All patient medications has been reviewed. Chief Complaint   Patient presents with    Diabetes     type 2 f/u    Hypertension     f/u       Health Maintenance Review: Patient reminded of \"due or due soon\" health maintenance. I have asked the patient to contact his/her primary care provider (PCP) for follow-up on his/her health maintenance. Patient-Reported Vitals 2022   Patient-Reported Weight 272.2lbs   Patient-Reported Systolic  282   Patient-Reported Diastolic 87        Wt Readings from Last 3 Encounters:   10/11/21 272 lb (123.4 kg)   10/06/21 272 lb 4.8 oz (123.5 kg)   21 277 lb (125.6 kg)     Temp Readings from Last 3 Encounters:   21 98.2 °F (36.8 °C) (Oral)   21 98.2 °F (36.8 °C) (Oral)   21 99.4 °F (37.4 °C)     BP Readings from Last 3 Encounters:   10/11/21 117/87   10/06/21 111/74   21 99/78     Pulse Readings from Last 3 Encounters:   10/11/21 68   10/06/21 94   21 77         Coordination of Care Questionnaire:   1) Have you been to an emergency room, urgent care, or hospitalized since your last visit?   no       2. Have seen or consulted any other health care provider since your last visit? NO      Patient is accompanied by self I have received verbal consent from Matthew Singleton Dr to discuss any/all medical information while they are present in the room.

## 2022-01-21 NOTE — LETTER
1/21/2022 8:43 AM    Mr. Kathy Reno [id number]      To Whom It May Concern:    Jelly Lisa is currently under the care of [address] [place] [address]. The following is just a brief case of a diabetic diet, to make you familiarize with what could be a diabetic diet:  First of all, you need to provide all the nutritions your system would need in a 24hr period, but also, need to be aware of the amount of calory and carbohydrate intake. Small amount of reading as follows: To plan a Meal you need to pay attention to lots of things,    You need to keep consistent amount of carbs meaning that you will try to consume the same amount of carbs at breakfast, your lunch, and finally at dinner time. This method would prevent the sugar level in blood to go up or dipping down too low. The recommended level for the carbohydrate is at or around the 45 grams as a target, this has to be for each main meal of the day. A good diabetic meal plan would need to provide your with a1,400 calories, in addition it needs to be Supplemented with a good healthy snack to achieve the personal amount of calorie goals. Just to start the day: the  Breakfast,  If you're running short in time such all of us in the mornings, prepare the first meal of the day as a bag of goodies that you can snap and go on, with a mixed and matched items that you like and you already prepared , the examples are plenties, IE's:  hardboiled eggs, seed/nuts, glass of skim milk or skimmed cheese stick,  yogurt for protein, peanut butter, unsweetened toast, bread, or instant oatmeal for whole grains; in addition to any wet or dried fruit, a banana, an apple.   Lunch another example  Get rid of the famous jam sandwiches of two slices of bread , this way you could get a lot of carbohydrate at one time, and Stick with a small whole wheat evelyn, rye breads in addition to some protein, veggies, a lot of salad, or even a Soup to satisfy the lunch meal.    Dinner  Make a great pot which is very heavy on veggies, serve it into a great soup of almost 2 cups which would be a filling amount. Pack a Snack  During the full day, there would be some nutritional periods or gaps which could be filled with a great snack, which most of the time means fruits and vegetables, you will need to take the edge of sometime with a good snack. I can give you a list of some filling snacks:Here are some of her favorite snacks:    Low fat or skimmed cheese with any veggies but tomatoes matches it the best, and some italian basil,  Small individualized pizza like oven baked or roasted, with good amount of mushroom, tomato sauce and skimmed cheese. Combination of a great apple and peanut butter  Some nuts and seeds   No salt or reduced salt of a V-8 juice sounds and looks great of another snack for the day            If there are questions or concerns please have the patient contact our office.         Sincerely,      Diaz Dueñas MD

## 2022-02-11 DIAGNOSIS — M05.741 RHEUMATOID ARTHRITIS INVOLVING BOTH HANDS WITH POSITIVE RHEUMATOID FACTOR (HCC): Primary | ICD-10-CM

## 2022-02-11 DIAGNOSIS — M05.742 RHEUMATOID ARTHRITIS INVOLVING BOTH HANDS WITH POSITIVE RHEUMATOID FACTOR (HCC): Primary | ICD-10-CM

## 2022-03-14 ENCOUNTER — DOCUMENTATION ONLY (OUTPATIENT)
Dept: FAMILY MEDICINE CLINIC | Age: 57
End: 2022-03-14

## 2022-03-17 ENCOUNTER — TELEPHONE (OUTPATIENT)
Dept: RHEUMATOLOGY | Age: 57
End: 2022-03-17

## 2022-03-19 PROBLEM — E66.01 OBESITY, MORBID (HCC): Status: ACTIVE | Noted: 2017-12-19

## 2022-03-22 ENCOUNTER — TELEPHONE (OUTPATIENT)
Dept: RHEUMATOLOGY | Age: 57
End: 2022-03-22

## 2022-03-22 NOTE — TELEPHONE ENCOUNTER
I made Dr. Da Wallace aware, he stated that we are not doing anything about this until this pt is seen in May since he has not been seen in awhile.

## 2022-03-22 NOTE — TELEPHONE ENCOUNTER
Shadia from [facility]! [employer] [address] called to advise the medication Enbrel was denied advised she will be faxing it over as well.     [phone]

## 2022-03-25 ENCOUNTER — TELEPHONE (OUTPATIENT)
Dept: RHEUMATOLOGY | Age: 57
End: 2022-03-25

## 2022-03-25 NOTE — TELEPHONE ENCOUNTER
Pt called to check if he will need any physical copies of his lab orders to get lab work completed. Informed pt that [facility] can see the orders in his chart.  Advised pt if [facility] has any trouble accessing the orders to contact our office to have lab orders faxed to [address] [place] [address].

## 2022-03-29 LAB
ALBUMIN SERPL-MCNC: 4.2 G/DL (ref 3.8–4.9)
ALBUMIN/GLOB SERPL: 1.5 {RATIO} (ref 1.2–2.2)
ALP SERPL-CCNC: 98 IU/L (ref 44–121)
ALT SERPL-CCNC: 19 IU/L (ref 0–44)
AST SERPL-CCNC: 17 IU/L (ref 0–40)
BASOPHILS # BLD AUTO: 0.1 X10E3/UL (ref 0–0.2)
BASOPHILS NFR BLD AUTO: 1 %
BILIRUB SERPL-MCNC: 0.3 MG/DL (ref 0–1.2)
BUN SERPL-MCNC: 11 MG/DL (ref 6–24)
BUN/CREAT SERPL: 10 (ref 9–20)
CALCIUM SERPL-MCNC: 9.2 MG/DL (ref 8.7–10.2)
CHLORIDE SERPL-SCNC: 103 MMOL/L (ref 96–106)
CO2 SERPL-SCNC: 21 MMOL/L (ref 20–29)
CREAT SERPL-MCNC: 1.09 MG/DL (ref 0.76–1.27)
EGFR: 81 ML/MIN/1.73
EOSINOPHIL # BLD AUTO: 0.2 X10E3/UL (ref 0–0.4)
EOSINOPHIL NFR BLD AUTO: 3 %
ERYTHROCYTE [DISTWIDTH] IN BLOOD BY AUTOMATED COUNT: 13.9 % (ref 11.6–15.4)
GAMMA INTERFERON BACKGROUND BLD IA-ACNC: 0.02 IU/ML
GLOBULIN SER CALC-MCNC: 2.8 G/DL (ref 1.5–4.5)
GLUCOSE SERPL-MCNC: 129 MG/DL (ref 65–99)
HBV CORE AB SERPL QL IA: NEGATIVE
HBV SURFACE AB SER QL: NON REACTIVE
HBV SURFACE AG SERPL QL IA: NEGATIVE
HCT VFR BLD AUTO: 43.5 % (ref 37.5–51)
HCV AB S/CO SERPL IA: <0.1 S/CO RATIO (ref 0–0.9)
HCV AB SERPL QL IA: NORMAL
HGB BLD-MCNC: 14.7 G/DL (ref 13–17.7)
IMM GRANULOCYTES # BLD AUTO: 0 X10E3/UL (ref 0–0.1)
IMM GRANULOCYTES NFR BLD AUTO: 0 %
LYMPHOCYTES # BLD AUTO: 3.1 X10E3/UL (ref 0.7–3.1)
LYMPHOCYTES NFR BLD AUTO: 34 %
M TB IFN-G BLD-IMP: NEGATIVE
M TB IFN-G CD4+ BCKGRND COR BLD-ACNC: 0.04 IU/ML
MCH RBC QN AUTO: 27.7 PG (ref 26.6–33)
MCHC RBC AUTO-ENTMCNC: 33.8 G/DL (ref 31.5–35.7)
MCV RBC AUTO: 82 FL (ref 79–97)
MITOGEN IGNF BLD-ACNC: 8.82 IU/ML
MONOCYTES # BLD AUTO: 0.7 X10E3/UL (ref 0.1–0.9)
MONOCYTES NFR BLD AUTO: 8 %
NEUTROPHILS # BLD AUTO: 5.1 X10E3/UL (ref 1.4–7)
NEUTROPHILS NFR BLD AUTO: 54 %
PLATELET # BLD AUTO: 337 X10E3/UL (ref 150–450)
POTASSIUM SERPL-SCNC: 3.9 MMOL/L (ref 3.5–5.2)
PROT SERPL-MCNC: 7 G/DL (ref 6–8.5)
QUANTIFERON INCUBATION, QF1T: NORMAL
QUANTIFERON TB2 AG: 0.04 IU/ML
RBC # BLD AUTO: 5.31 X10E6/UL (ref 4.14–5.8)
SERVICE CMNT-IMP: NORMAL
SODIUM SERPL-SCNC: 139 MMOL/L (ref 134–144)
WBC # BLD AUTO: 9.2 X10E3/UL (ref 3.4–10.8)

## 2022-04-05 ENCOUNTER — HOSPITAL ENCOUNTER (EMERGENCY)
Age: 57
Discharge: HOME | End: 2022-04-05
Attending: EMERGENCY MEDICINE
Payer: COMMERCIAL

## 2022-04-05 VITALS
WEIGHT: 278 LBS | HEART RATE: 85 BPM | SYSTOLIC BLOOD PRESSURE: 148 MMHG | DIASTOLIC BLOOD PRESSURE: 86 MMHG | TEMPERATURE: 98.2 F | BODY MASS INDEX: 43.63 KG/M2 | OXYGEN SATURATION: 98 % | RESPIRATION RATE: 16 BRPM | HEIGHT: 67 IN

## 2022-04-05 DIAGNOSIS — M25.461 EFFUSION OF RIGHT KNEE: Primary | ICD-10-CM

## 2022-04-05 DIAGNOSIS — M25.561 ACUTE PAIN OF RIGHT KNEE: ICD-10-CM

## 2022-04-05 PROCEDURE — 75810000054 HC ARTHOCENTISIS JOINT

## 2022-04-05 PROCEDURE — 99283 EMERGENCY DEPT VISIT LOW MDM: CPT

## 2022-04-05 PROCEDURE — 74011000250 HC RX REV CODE- 250: Performed by: PHYSICIAN ASSISTANT

## 2022-04-05 PROCEDURE — 96372 THER/PROPH/DIAG INJ SC/IM: CPT

## 2022-04-05 RX ORDER — PREDNISONE 10 MG/1
TABLET ORAL
Qty: 21 TABLET | Refills: 0 | Status: SHIPPED | OUTPATIENT
Start: 2022-04-05

## 2022-04-05 RX ORDER — LIDOCAINE HYDROCHLORIDE 20 MG/ML
1 INJECTION, SOLUTION EPIDURAL; INFILTRATION; INTRACAUDAL; PERINEURAL ONCE
Status: COMPLETED | OUTPATIENT
Start: 2022-04-05 | End: 2022-04-05

## 2022-04-05 RX ADMIN — LIDOCAINE HYDROCHLORIDE 20 MG: 20 INJECTION, SOLUTION EPIDURAL; INFILTRATION; INTRACAUDAL; PERINEURAL at 17:00

## 2022-04-05 NOTE — ED PROVIDER NOTES
EMERGENCY DEPARTMENT HISTORY AND PHYSICAL EXAM      Date: 4/5/2022  Patient Name: Alicia Bowie    History of Presenting Illness     Chief Complaint   Patient presents with    Knee Pain     \"I think I have fluid on my knee again. \" x 1 month. [facility] but the pain came back when the steroids were finished. History Provided By: Patient    HPI: Alicia Bowie, 56 y.o. male with significant PMHx for RA, osteoarthritis, presents by POV to the ED with cc of non-traumatic right knee pain. The patient has a history of rheumatoid arthritis and is on Enbrel. He started a new job, which caused him to miss his last appointment with rheumatology and he ran out of his RA medications. He has an appointment in May to get them refilled. However, his knee has been swelling since running out of his medications. He was seen at [facility] 2 weeks ago and put on a steroid taper. This helped but the swelling return as soon as he completed the steroids. He is requesting that we pull the fluid off his knee as this has helped in the past. He has had this done in the ER and notes that [facility] was unable to do this procedure. The pain is constant and he describes it as a tightness. The pain is non-radiating and worsens with flexion. There are no other complaints, changes, or physical findings at this time. Social Hx: Tobacco (former smoker), EtOH (denies), Illicit drug use (denies)     PCP: Av Garrett MD    No current facility-administered medications on file prior to encounter. Current Outpatient Medications on File Prior to Encounter   Medication Sig Dispense Refill    lisinopriL (PRINIVIL, ZESTRIL) 20 mg tablet Take 1 Tablet by mouth daily. 30 Tablet 6    metFORMIN (GLUCOPHAGE) 1,000 mg tablet Take 1 Tablet by mouth two (2) times daily (with meals). 180 Tablet 3    rosuvastatin (CRESTOR) 10 mg tablet Take 1 Tablet by mouth nightly.  30 Tablet 3    diclofenac EC (VOLTAREN) 75 mg EC tablet Take 1 tablets twice daily 60 Tablet 1    EnbreL SureClick 50 mg/mL (1 mL) injection INJECT 1 PEN (50MG) UNDER THE SKIN EVERY 7 DAYS 4 mL 3    ciclopirox (LOPROX) 0.77 % topical cream Apply  to affected area two (2) times a day. (Patient taking differently: Apply  to affected area two (2) times a day. As needed) 90 g 3    hydrOXYchloroQUINE (PLAQUENIL) 200 mg tablet Take 2 Tablets by mouth daily. 60 Tablet 6    sildenafil citrate (VIAGRA) 100 mg tablet Take 1 Tablet by mouth as needed for Erectile Dysfunction. 12 Tablet 11    OTHER C-PAP MACHINE WHILE SLEEPING         Past History     Past Medical History:  Past Medical History:   Diagnosis Date    Cellulitis and abscess of trunk 7/31/2014    Chronic back pain greater than 3 months duration 7/31/2014    Diabetes ([facility]     Family history of prostate cancer 7/31/2014    Hyperhydrosis disorder 7/31/2014    Hypertension     Intention tremor 12/9/2015    Prediabetes 6/25/2014    RA (rheumatoid arthritis) ([facility] [address] 7/31/2014    Rash of perineum 6/25/2014    Tinea manus 1/19/2016    Tinea pedis of both feet 1/19/2016    Tobacco abuse 6/25/2014       Past Surgical History:  Past Surgical History:   Procedure Laterality Date    HX ORTHOPAEDIC      right leg and knee       Family History:  Family History   Problem Relation Age of Onset    Diabetes Mother     Hypertension Mother     Stroke Mother     Diabetes Father     Hypertension Father        Social History:  Social History     Tobacco Use    Smoking status: Former Smoker     Packs/day: 1.00    Smokeless tobacco: Never Used   Substance Use Topics    Alcohol use: Yes     Comment: occasionally    Drug use: No       Allergies:  No Known Allergies      Review of Systems   Review of Systems   Constitutional: Negative for chills, diaphoresis and fever. HENT: Negative for congestion, ear pain, rhinorrhea and sore throat.     Respiratory: Negative for cough and shortness of breath. Cardiovascular: Negative for chest pain. Gastrointestinal: Negative for abdominal pain, constipation, diarrhea, nausea and vomiting. Genitourinary: Negative for difficulty urinating, dysuria, frequency and hematuria. Musculoskeletal: Positive for arthralgias. Negative for myalgias. Neurological: Negative for headaches. All other systems reviewed and are negative. Physical Exam   Physical Exam  Vitals and nursing note reviewed. Constitutional:       General: He is not in acute distress. Appearance: He is well-developed. He is obese. He is not diaphoretic. Comments: Pleasant 56 y.o. -American male    HENT:      Head: Normocephalic and atraumatic. Eyes:      General:         Right eye: No discharge. Left eye: No discharge. Conjunctiva/sclera: Conjunctivae normal.   Cardiovascular:      Rate and Rhythm: Normal rate. Pulses: Normal pulses. Pulmonary:      Effort: Pulmonary effort is normal.   Musculoskeletal:      Cervical back: Normal range of motion and neck supple. Comments: R KNEE: Effusion present with mild TTP. FROM. No crepitus. No laxity. Distal NV intact. Cap refill < 2 sec. normothermic. Ambulatory. Skin:     General: Skin is warm and dry. Findings: No erythema or rash. Neurological:      Mental Status: He is alert and oriented to person, place, and time. Psychiatric:         Behavior: Behavior normal.         Diagnostic Study Results     Labs - none    Radiologic Studies - none    Medical Decision Making   I am the first provider for this patient. I reviewed the vital signs, available nursing notes, past medical history, past surgical history, family history and social history. Vital Signs-Reviewed the patient's vital signs.   Patient Vitals for the past 12 hrs:   Temp Pulse Resp BP SpO2   04/05/22 1536 98.2 °F (36.8 °C) 85 16 (!) 148/86 98 %       Records Reviewed: Nursing Notes    Provider Notes (Medical Decision Making):   Patient presents ED stable vital signs for nontraumatic knee pain. Has a history of rheumatoid arthritis and knee effusions in the past.  He is requesting drainage of his knee. See procedure note below. ED Course:   Initial assessment performed. The patients presenting problems have been discussed, and they are in agreement with the care plan formulated and outlined with them. I have encouraged them to ask questions as they arise throughout their visit. Procedure Note - Arthrocentesis:   5:40 PM  Performed by LISETTE Story. Immediately prior to the procedure, the patient was reevaluated and found suitable for the planned procedure and any planned medications. Immediately prior to the procedure a time out was called to verify the correct patient, procedure, equipment, staff, and marking as appropriate. [facility] Indication for procedure: Symptomatic relief of large effusion  Approach: lateral  The site prepped with Betadine. Sterile field established. Anesthesia was obtained with 2 mLs of Lidocaine 2% without epinephrine. Right knee joint was entered, using a 18 gauge needle, and 20 cc's of serosanguinous fluid was withdrawn. Estimated blood loss: none  The procedure took 1-15 minutes, and pt tolerated well. Critical Care Time: None    Disposition:  DISCHARGE NOTE:  5:49 PM  The pt is ready for discharge. The pt's signs, symptoms, diagnosis, and discharge instructions have been discussed and pt has conveyed their understanding. The pt is to follow up as recommended or return to ER should their symptoms worsen. Plan has been discussed and pt is in agreement. PLAN:  1. Current Discharge Medication List      START taking these medications    Details   predniSONE (STERAPRED DS) 10 mg dose pack Standard 6 day taper  Qty: 21 Tablet, Refills: 0  Start date: 4/5/2022           2.    Follow-up Information     Follow up With Specialties Details Why Contact Info    Mike Mclean MD Family Medicine In 1 week As needed Ann Saba.  [phone]      John Neville MD Rheumatology, Pediatric Rheumatology, Internal Medicine In 1 week  [address] [facility]  [phone]      [facility] EMERGENCY DEPT Emergency Medicine  If symptoms worsen [address]  [address] [facility]  [phone]        Return to ED if worse     Diagnosis     Clinical Impression:   1. Effusion of right knee    2. Acute pain of right knee          Please note that this dictation was completed with [employer], the computer voice recognition software. Quite often unanticipated grammatical, syntax, homophones, and other interpretive errors are inadvertently transcribed by the computer software. Please disregards these errors. Please excuse any errors that have escaped final proofreading.

## 2022-04-05 NOTE — DISCHARGE INSTRUCTIONS
It was a pleasure taking care of you at [facility] Emergency Department today. We know that when you come to [facility], you are entrusting us with your health, comfort, and safety. Our physicians and nurses honor that trust, and we truly appreciate the opportunity to care for you and your loved ones. We also value your feedback. If you receive a survey about your Emergency Department experience today, please fill it out. We care about our patients' feedback, and we listen to what you have to say. Thank you!

## 2022-04-17 DIAGNOSIS — Z79.4 TYPE 2 DIABETES MELLITUS WITH HYPERGLYCEMIA, WITH LONG-TERM CURRENT USE OF INSULIN (HCC): ICD-10-CM

## 2022-04-17 DIAGNOSIS — I10 HTN, GOAL BELOW 140/90: ICD-10-CM

## 2022-04-17 DIAGNOSIS — E11.65 TYPE 2 DIABETES MELLITUS WITH HYPERGLYCEMIA, WITH LONG-TERM CURRENT USE OF INSULIN (HCC): ICD-10-CM

## 2022-04-20 RX ORDER — DICLOFENAC SODIUM 75 MG/1
TABLET, DELAYED RELEASE ORAL
Qty: 60 TABLET | Refills: 1 | Status: SHIPPED | OUTPATIENT
Start: 2022-04-20 | End: 2022-07-07

## 2022-05-13 ENCOUNTER — OFFICE VISIT (OUTPATIENT)
Dept: RHEUMATOLOGY | Age: 57
End: 2022-05-13
Payer: COMMERCIAL

## 2022-05-13 VITALS
SYSTOLIC BLOOD PRESSURE: 148 MMHG | DIASTOLIC BLOOD PRESSURE: 102 MMHG | WEIGHT: 285 LBS | TEMPERATURE: 98.6 F | RESPIRATION RATE: 16 BRPM | OXYGEN SATURATION: 94 % | BODY MASS INDEX: 44.64 KG/M2 | HEART RATE: 84 BPM

## 2022-05-13 DIAGNOSIS — M05.742 RHEUMATOID ARTHRITIS INVOLVING BOTH HANDS WITH POSITIVE RHEUMATOID FACTOR (HCC): Primary | ICD-10-CM

## 2022-05-13 DIAGNOSIS — M05.741 RHEUMATOID ARTHRITIS INVOLVING BOTH HANDS WITH POSITIVE RHEUMATOID FACTOR (HCC): Primary | ICD-10-CM

## 2022-05-13 PROCEDURE — 99214 OFFICE O/P EST MOD 30 MIN: CPT | Performed by: PEDIATRICS

## 2022-05-13 RX ORDER — PREDNISONE 5 MG/1
5 TABLET ORAL 2 TIMES DAILY
Qty: 60 TABLET | Refills: 0 | Status: SHIPPED | OUTPATIENT
Start: 2022-05-13 | End: 2022-05-13 | Stop reason: SDUPTHER

## 2022-05-13 RX ORDER — PREDNISONE 5 MG/1
5 TABLET ORAL 2 TIMES DAILY
Qty: 60 TABLET | Refills: 1 | Status: SHIPPED | OUTPATIENT
Start: 2022-05-13

## 2022-05-13 RX ORDER — ETANERCEPT 50 MG/ML
50 SOLUTION SUBCUTANEOUS
Qty: 3.92 ML | Refills: 4 | Status: SHIPPED | OUTPATIENT
Start: 2022-05-13 | End: 2022-05-19 | Stop reason: SDUPTHER

## 2022-05-13 NOTE — PROGRESS NOTES
RHEUMATOLOGY PROBLEM LIST AND CHIEF COMPLAINT  1. Rheumatoid Arthritis (2015) - Joint pain/swelling, synovitis in hands, wrists, elbows, rheumatoid nodules on elbows, morning stiffness. Elevated ESR, CCP, RF, CRP. Therapy History:  Previous DMARDs: MTX (Past-09/2016, 10/2016 - 12/2017; AEs- nodulosis)   Current DMARDs: Enbrel (11/2015-current), Plaquenil (2015 - 2017, 3/2018-current)  Pt reported COVID-19 vaccination status: Vaccinated. 2. Shoulder OA and rotator cuff disease    INTERVAL HISTORY  This is a 56 y.o.  male. Today, the patient complains of pain in the joints. Location: knee   Severity: 9 on a scale of 0-10  Timing: all day  Duration: 9 months  Context/Associated signs and symptoms: The patient reports continued nodulosis on his elbows. He reports a recent tremor in his hands when he reaches for an object. He complains of right knee pain and decreased range of motion. He reports having 20 cc of fluid removed from his right knee at the ED about one month ago. He subsequently used a short course of oral steroids. He complains of shoulder pain. He continues on Plaquenil 400 mg daily. He ran out of Enbrel 50 mg weekly due to non-adherence with labs and office visits. He continues to use topical Diclofenac. February labs were reviewed.       RHEUMATOLOGY REVIEW OF SYSTEMS   Positives as per history  Negatives as follows:  Patito Leeey:  Denies unexplained persistent fevers, weight change, chronic fatigue  HEAD/EYES:   Denies eye redness, blurry vision or sudden loss of vision, dry eyes, HA, temporal artery pain  ENT:    Denies oral/nasal ulcers, recurrent sinus infections, dry mouth  RESPIRATORY:  No pleuritic pain, history of pleural effusions, hemoptysis, exertional dyspnea  CARDIOVASCULAR:  Denies chest pain, history of pericardial effusions  GASTRO:   Denies heartburn, esophageal dysmotility, abdominal pain, nausea, vomiting, diarrhea, blood in the stool  HEMATOLOGIC:  No easy bruising, purpura, swollen lymph nodes  SKIN:    Denies alopecia, ulcers, nodules, sun sensitivity, unexplained persistent rash   VASCULAR:   Denies edema, cyanosis, raynaud phenomenon  NEUROLOGIC:  Denies specific muscle weakness, paresthesias   PSYCHIATRIC:  No sleep disturbance / snoring, depression, anxiety  MSK:    No morning stiffness >1 hour, SI joint pain, persistent joint swelling    PAST MEDICAL HISTORY  Reviewed with patient, significant changes in medical history - no    PHYSICAL EXAM  Blood pressure (!) 148/102, pulse 84, temperature 98.6 °F (37 °C), temperature source Oral, resp. rate 16, weight 285 lb (129.3 kg), SpO2 94 %. GENERAL APPEARANCE: Well-nourished, no acute distress  NECK: No adenopathy  ENT: No oral ulcers  CARDIOVASCULAR: Heart rhythm is regular. No murmur, rub, gallop  CHEST: Normal vesicular breath sounds. No wheezes, rales, pleural friction rubs  ABDOMINAL: The abdomen is soft and nontender. Bowel sounds are normal  SKIN: No rash, palpable purpura, digital ulcer, abnormal thickening   MUSCULOSKELETAL: Rotator cuff signs on the left - unchanged, Antalgic gait  Upper extremities -  2 Nodules noted over left elbow - unchanged. Rheumatoid nodule on b/l forearms near olecranon bursa. No synovitis. Minimal synovial thickening in the right 3rd digit, MCP & PIP, no synovitis. Decreased ROM of left shoulder. - unchanged. Flexion contracture in PIPs and MCPs. Lower extremities - B/L Knee crepitus, bony prominence (R>L), right knee significant dROM and tenderness, scar over right knee from previous surgery. LABS, RADIOLOGY AND PROCEDURES   Previous labs reviewed -Yes  Last TB 12/2017    ASSESSMENT  1. Rheumatoid Arthritis -(has worsened)- The patient's disease has worsened while off treatment. I recommend he restart Enbrel 50 mg weekly and continue on Plaquenil.  I suspect his knee pain is based on a combination of active rheumatoid arthritis and osteoarthritis secondary to untreated rheumatoid arthritis. Consider evaluation by orthopedics for possible knee replacement if his knee pain does not improve significantly with use of Enbrel. Referral provided. I would not recommend use of a steroid injection at this time. I will provide patient with oral steroids to improve his discomfort instead until Enbrel is restarted. Provided patient with a referral to neurology for his intention tremor. Discussed adherence to office visits and lab work in order to obtain his medications in a timely manner to prevent worsening of his disease. Labs are not needed today. Follow up in 4 months. 2. Drug therapy monitoring for toxicity (methotrexate/Enbrel) - CBC, BUN, Cr, AST, ALT and albumin every 4 months  3. Osteoarthritis -(has improved)- This has worsened. I will provide a referral to orthopedics for further evaluation of his knee and shoulder pain if needed. He should continue with diclofenac PRN and weight loss. PLAN  1. Enbrel 50 mg weekly - seek re-approval   2. Plaquenil 400 mg daily  3. Prednisone 5 mg BID until Enbrel is started    4. Check CBC, CMP  5. Referral to neurology & orthopedics  6. Return in 4 months    Refugio Mark MD  Adult and Pediatric Rheumatology     [facility], [address] [place] [address], Phone [phone], Fax [phone]     Visiting  of Pediatrics    Department of Pediatrics, [facility] of [address] [place] [address], [address] [place] [address], Phone [phone], Fax [phone]    There are no Patient Instructions on file for this visit.     cc:  Mike Mclean MD    Written by jeffrey Dejesus, as dictated by Dr. Lupe Mckoy M.D.

## 2022-05-13 NOTE — PROGRESS NOTES
Chief Complaint   Patient presents with    Joint Pain     1. Have you been to the ER, urgent care clinic since your last visit? Hospitalized since your last visit? No    2. Have you seen or consulted any other health care providers outside of the [address] [place] [address] since your last visit? Include any pap smears or colon screening.  No

## 2022-05-19 ENCOUNTER — DOCUMENTATION ONLY (OUTPATIENT)
Dept: PHARMACY | Age: 57
End: 2022-05-19

## 2022-05-19 NOTE — PROGRESS NOTES
[facility] at [facility] Update    Date: 05/19/22    Grisel Milesfrancesca 1965    Medication: Enbrel Sureclick 50 mg/ml     Prior Authorization: through 5/18/23    Prescription needs to be transferred to [facility] (phone: [phone]). [facility] ([phone]) was notified that this specialty prescription needs to be transferred to the insurance mandated specialty pharmacy above.      Christi Duarte, [address] at [facility], [facility],  Josey Worrell   [place], [facility] [address]  phone: [phone]   fax: [phone]

## 2022-05-20 RX ORDER — ETANERCEPT 50 MG/ML
50 SOLUTION SUBCUTANEOUS
Qty: 3.92 ML | Refills: 4 | Status: SHIPPED | OUTPATIENT
Start: 2022-05-20 | End: 2022-06-19

## 2022-05-30 DIAGNOSIS — M45.9 RHEUMATOID ARTHRITIS INVOLVING VERTEBRA, UNSPECIFIED WHETHER RHEUMATOID FACTOR PRESENT (HCC): ICD-10-CM

## 2022-05-30 DIAGNOSIS — I10 HTN, GOAL BELOW 140/90: ICD-10-CM

## 2022-05-30 DIAGNOSIS — E11.65 TYPE 2 DIABETES MELLITUS WITH HYPERGLYCEMIA, WITH LONG-TERM CURRENT USE OF INSULIN (HCC): ICD-10-CM

## 2022-05-30 DIAGNOSIS — Z79.4 TYPE 2 DIABETES MELLITUS WITH HYPERGLYCEMIA, WITH LONG-TERM CURRENT USE OF INSULIN (HCC): ICD-10-CM

## 2022-05-30 DIAGNOSIS — Z71.89 ADVICE GIVEN ABOUT COVID-19 VIRUS INFECTION: ICD-10-CM

## 2022-05-30 DIAGNOSIS — E11.65 UNCONTROLLED TYPE 2 DIABETES MELLITUS WITH HYPERGLYCEMIA (HCC): ICD-10-CM

## 2022-05-30 DIAGNOSIS — R73.03 PREDIABETES: ICD-10-CM

## 2022-05-31 RX ORDER — LISINOPRIL 20 MG/1
TABLET ORAL
Qty: 90 TABLET | Refills: 1 | Status: SHIPPED | OUTPATIENT
Start: 2022-05-31

## 2022-07-07 DIAGNOSIS — M05.742 RHEUMATOID ARTHRITIS INVOLVING BOTH HANDS WITH POSITIVE RHEUMATOID FACTOR (HCC): ICD-10-CM

## 2022-07-07 DIAGNOSIS — I10 HTN, GOAL BELOW 140/90: ICD-10-CM

## 2022-07-07 DIAGNOSIS — Z79.4 TYPE 2 DIABETES MELLITUS WITH HYPERGLYCEMIA, WITH LONG-TERM CURRENT USE OF INSULIN (HCC): ICD-10-CM

## 2022-07-07 DIAGNOSIS — E11.65 TYPE 2 DIABETES MELLITUS WITH HYPERGLYCEMIA, WITH LONG-TERM CURRENT USE OF INSULIN (HCC): ICD-10-CM

## 2022-07-07 DIAGNOSIS — M05.741 RHEUMATOID ARTHRITIS INVOLVING BOTH HANDS WITH POSITIVE RHEUMATOID FACTOR (HCC): ICD-10-CM

## 2022-07-07 RX ORDER — DICLOFENAC SODIUM 75 MG/1
TABLET, DELAYED RELEASE ORAL
Qty: 60 TABLET | Refills: 1 | Status: SHIPPED | OUTPATIENT
Start: 2022-07-07

## 2022-07-07 RX ORDER — HYDROXYCHLOROQUINE SULFATE 200 MG/1
400 TABLET, FILM COATED ORAL DAILY
Qty: 60 TABLET | Refills: 1 | Status: SHIPPED | OUTPATIENT
Start: 2022-07-07

## 2022-10-20 ENCOUNTER — TELEPHONE (OUTPATIENT)
Dept: SLEEP MEDICINE | Age: 57
End: 2022-10-20

## 2022-10-21 ENCOUNTER — TELEPHONE (OUTPATIENT)
Dept: SLEEP MEDICINE | Age: 57
End: 2022-10-21

## 2022-10-21 ENCOUNTER — OFFICE VISIT (OUTPATIENT)
Dept: SLEEP MEDICINE | Age: 57
End: 2022-10-21

## 2022-10-21 DIAGNOSIS — G47.33 OSA (OBSTRUCTIVE SLEEP APNEA): Primary | ICD-10-CM

## 2022-10-21 NOTE — PROGRESS NOTES
Patient requested a 30/90 DOT download. Patient's device was not accessible remotely. Download scanned into patient's chart and provided to the front staff for the patient. Patient has a follow-up scheduled for 12/23.

## 2022-10-21 NOTE — TELEPHONE ENCOUNTER
Patient called requesting a DOT download. Patient was informed that he had not been seen in over two years and he would need to be seen prior to receiving a download. Patient scheduled for a follow-up in December. 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 He stated that he would keep his appointment if he could get his download for his DOT download for his physical.  Informed patient that an exception would be made this time.

## 2022-10-24 ENCOUNTER — TELEPHONE (OUTPATIENT)
Dept: SLEEP MEDICINE | Age: 57
End: 2022-10-24

## 2022-10-24 DIAGNOSIS — G47.33 OSA (OBSTRUCTIVE SLEEP APNEA): Primary | ICD-10-CM

## 2022-10-24 NOTE — TELEPHONE ENCOUNTER
Mr. Arturo Syed called with questions regarding the iM[employer] donny. He stated that the has not been able to get it to connect to his device. He was given the number to the [employer] support ([phone]).

## 2022-11-05 DIAGNOSIS — M05.742 RHEUMATOID ARTHRITIS INVOLVING BOTH HANDS WITH POSITIVE RHEUMATOID FACTOR (HCC): ICD-10-CM

## 2022-11-05 DIAGNOSIS — M05.741 RHEUMATOID ARTHRITIS INVOLVING BOTH HANDS WITH POSITIVE RHEUMATOID FACTOR (HCC): ICD-10-CM

## 2022-11-05 DIAGNOSIS — I10 HTN, GOAL BELOW 140/90: ICD-10-CM

## 2022-11-05 DIAGNOSIS — Z79.4 TYPE 2 DIABETES MELLITUS WITH HYPERGLYCEMIA, WITH LONG-TERM CURRENT USE OF INSULIN (HCC): ICD-10-CM

## 2022-11-05 DIAGNOSIS — E11.65 TYPE 2 DIABETES MELLITUS WITH HYPERGLYCEMIA, WITH LONG-TERM CURRENT USE OF INSULIN (HCC): ICD-10-CM

## 2022-11-07 RX ORDER — HYDROXYCHLOROQUINE SULFATE 200 MG/1
400 TABLET, FILM COATED ORAL DAILY
Qty: 60 TABLET | Refills: 1 | Status: SHIPPED | OUTPATIENT
Start: 2022-11-07

## 2022-11-07 RX ORDER — DICLOFENAC SODIUM 75 MG/1
TABLET, DELAYED RELEASE ORAL
Qty: 60 TABLET | Refills: 1 | Status: SHIPPED | OUTPATIENT
Start: 2022-11-07

## 2022-12-26 NOTE — PROGRESS NOTES
[address] [facility] [address] [place]. [place], [address]   Tel.  [phone]   Fax. [address] [facility] [address]   [place], [address] [facility]   Tel.  [phone]   Fax. [phone] [address] [facility] Timmy Zhang   Tel.  [phone]   Fax. [address] (: 1965) is a 56 y.o. male, new patient, seen for positive airway pressure follow-up and evaluation. He was last seen by Dr. Sierra Timmons on 2019, prior notes reviewed in detail. Home sleep test 2019 showed AHI of 79/hr with a lowest SaO2 of 65%. He is seen today for follow up. ASSESSMENT/PLAN:    ICD-10-CM ICD-9-CM    1. DANNY (obstructive sleep apnea)  G47.33 327.23 AMB SUPPLY ORDER      2. Primary hypertension  I10 401.9       3. BMI 40.0-44.9, adult ([facility] [address]  Z68.41 V85.41         AHI = 79 (2019). On APAP :  5-20 cmH2O. Set up 2019. He is adherent with PAP therapy and PAP continues to benefit patient and remains necessary for control of his sleep apnea. Sleep Apnea - continue on current pressures. Orders Placed This Encounter    AMB SUPPLY ORDER     Primary Encounter Diagnosis: Obstructive Sleep Apnea  (G47.33)    ResMed Device with Heated Humidifer [id number] / [id number]. Positive Airway Pressure Therapy: Duration of need: 99 months. Set Pressure: 5-20 cmH2O  Diagnosis: (G47.33) DANNY (obstructive sleep apnea)  (primary encounter diagnosis)     Replacement Supplies for Positive Airway Pressure Therapy Device:   Duration of need: 99 months.  Full Face Mask 1 every 3 months.  Full Face Mask Cushion 1 per month.  Headgear 1 every 6 months.  Positive Airway Pressure chinstrap 1 every 6 months.  Tubing with heating element 1 every 3 months.  Filter(s) Disposable 2 per month.  Filter(s) Non-Disposable 1 every 6 months. .   [address] [facility] for Humidifier (Replace) 1 every 6 months.     RENAN Murray-BC NPI: [id number]    Electronically signed. Date:- 12/27/22     * Counseling was provided regarding the importance of regular PAP use with emphasis on ensuring sufficient total sleep time, proper sleep hygiene, and safe driving. * Re-enforced proper and regular cleaning for the device. * He was asked to contact our office for any problems regarding PAP therapy. 2. Hypertension -  continue on his current regimen, he will continue to monitor his BP and follow up with his PMD for reevaluation/adjustment of medications if warranted. I have reviewed the relationship between hypertension as it relates to sleep-disordered breathing. 3. Recommended a dedicated weight loss program through appropriate diet and exercise regimen as significant weight reduction has been shown to reduce severity of obstructive sleep apnea. SUBJECTIVE/OBJECTIVE:    He  is seen today for follow up on PAP device and reports no problems using the device. The following concerns identified:    Drowsiness no Problems exhaling no   Snoring no Forget to put on no   Mask Comfortable yes Can't fall asleep no   Dry Mouth no Mask falls off no   Air Leaking no Frequent awakenings no     He admits that his sleep has significantly improved on PAP therapy using full face mask and heated tubing. Review of device download indicated:  Auto pressure: 5-20 cmH2O; Max Pressure: 16.2 cmH2O;  95th percentile Pressure: 14.6 cmH2O   95th Percentile Leak: 17.5 L/Min     % Used Days >= 4 hours: 70. Avg hours used: 4 hours 59 minutes    Therapy Apnea Index averaged over PAP use: 0.4/hr which reflects significantly improved sleep breathing condition. [place] Sleepiness Score: 5 and Modified F.O.S.Q. Score Total / 2: 20 which reflects improved sleep quality over therapy time. Sleep Review of Systems: notable for Negative difficulty falling asleep; Negative awakenings at night; Negative early morning headaches; Negative memory problems;  Negative concentration issues; Negative chest pain; Negative shortness of breath; Negative significant joint pain at night; Negative significant muscle pain at night; Negative rashes or itching; Negative heartburn; Negative significant mood issues    Visit Vitals  BP (!) 145/87 (BP 1 Location: Right upper arm, BP Patient Position: Sitting)   Pulse 86   Ht 5' 7\" (1.702 m)   Wt 287 lb (130.2 kg)   SpO2 94%   BMI 44.95 kg/m²     Neck circ. in \"inches\": 19    General:   Alert, oriented, not in acute distress   Eyes:  Anicteric Sclerae; no obvious strabismus   Nose:  No obvious nasal septum deviation    Neck:   Midline trachea   Chest/Lungs:  Symmetrical lung expansion, clear lung fields on auscultation    CVS:  Normal rate, regular rhythm,  no JVD   Extremities:  No obvious rashes, no edema    Neuro:  No focal deficits; No obvious tremor    Psych:  Normal affect,  normal countenance     Patient's phone number [phone] (home)  was reviewed and confirmed for accuracy. He gives permission for messages regarding results and appointments to be left at that number. On this date 12/27/2022 I have spent 30 minutes reviewing previous notes, test results and face to face with the patient discussing the diagnosis and importance of compliance with the treatment plan as well as documenting on the day of the visit. An electronic signature was used to authenticate this note.     -- Breanne May NP, [facility]  12/27/22

## 2022-12-27 ENCOUNTER — OFFICE VISIT (OUTPATIENT)
Dept: SLEEP MEDICINE | Age: 57
End: 2022-12-27
Payer: COMMERCIAL

## 2022-12-27 ENCOUNTER — DOCUMENTATION ONLY (OUTPATIENT)
Dept: SLEEP MEDICINE | Age: 57
End: 2022-12-27

## 2022-12-27 VITALS
SYSTOLIC BLOOD PRESSURE: 145 MMHG | DIASTOLIC BLOOD PRESSURE: 87 MMHG | BODY MASS INDEX: 45.04 KG/M2 | WEIGHT: 287 LBS | HEART RATE: 86 BPM | HEIGHT: 67 IN | OXYGEN SATURATION: 94 %

## 2022-12-27 DIAGNOSIS — I10 PRIMARY HYPERTENSION: ICD-10-CM

## 2022-12-27 DIAGNOSIS — G47.33 OSA (OBSTRUCTIVE SLEEP APNEA): Primary | ICD-10-CM

## 2022-12-27 PROCEDURE — 3078F DIAST BP <80 MM HG: CPT | Performed by: NURSE PRACTITIONER

## 2022-12-27 PROCEDURE — 99203 OFFICE O/P NEW LOW 30 MIN: CPT | Performed by: NURSE PRACTITIONER

## 2022-12-27 PROCEDURE — 3074F SYST BP LT 130 MM HG: CPT | Performed by: NURSE PRACTITIONER

## 2022-12-27 RX ORDER — ETANERCEPT 50 MG/ML
SOLUTION SUBCUTANEOUS
COMMUNITY
Start: 2022-12-13

## 2023-02-12 PROCEDURE — 94761 N-INVAS EAR/PLS OXIMETRY MLT: CPT

## 2023-02-12 PROCEDURE — 99284 EMERGENCY DEPT VISIT MOD MDM: CPT

## 2023-02-13 ENCOUNTER — HOSPITAL ENCOUNTER (EMERGENCY)
Age: 58
Discharge: HOME | End: 2023-02-13
Attending: EMERGENCY MEDICINE
Payer: COMMERCIAL

## 2023-02-13 VITALS
BODY MASS INDEX: 44.53 KG/M2 | RESPIRATION RATE: 18 BRPM | DIASTOLIC BLOOD PRESSURE: 97 MMHG | HEIGHT: 67 IN | OXYGEN SATURATION: 93 % | HEART RATE: 80 BPM | WEIGHT: 283.73 LBS | SYSTOLIC BLOOD PRESSURE: 149 MMHG | TEMPERATURE: 98.6 F

## 2023-02-13 DIAGNOSIS — T46.4X5A ACE INHIBITOR-AGGRAVATED ANGIOEDEMA, INITIAL ENCOUNTER: ICD-10-CM

## 2023-02-13 DIAGNOSIS — I10 ACCELERATED HYPERTENSION: ICD-10-CM

## 2023-02-13 DIAGNOSIS — T78.3XXA ACE INHIBITOR-AGGRAVATED ANGIOEDEMA, INITIAL ENCOUNTER: ICD-10-CM

## 2023-02-13 DIAGNOSIS — T78.3XXA ANGIOEDEMA, INITIAL ENCOUNTER: Primary | ICD-10-CM

## 2023-02-13 DIAGNOSIS — R22.0 SWELLING OF UPPER LIP: ICD-10-CM

## 2023-02-13 PROCEDURE — 74011250636 HC RX REV CODE- 250/636: Performed by: EMERGENCY MEDICINE

## 2023-02-13 PROCEDURE — 96375 TX/PRO/DX INJ NEW DRUG ADDON: CPT

## 2023-02-13 PROCEDURE — 96374 THER/PROPH/DIAG INJ IV PUSH: CPT

## 2023-02-13 PROCEDURE — 74011000250 HC RX REV CODE- 250: Performed by: EMERGENCY MEDICINE

## 2023-02-13 RX ORDER — DIPHENHYDRAMINE HYDROCHLORIDE 50 MG/ML
50 INJECTION, SOLUTION INTRAMUSCULAR; INTRAVENOUS ONCE
Status: COMPLETED | OUTPATIENT
Start: 2023-02-13 | End: 2023-02-13

## 2023-02-13 RX ORDER — PREDNISONE 50 MG/1
50 TABLET ORAL DAILY
Qty: 3 TABLET | Refills: 0 | Status: SHIPPED | OUTPATIENT
Start: 2023-02-13 | End: 2023-02-16

## 2023-02-13 RX ORDER — DIPHENHYDRAMINE HCL 25 MG
50 CAPSULE ORAL
Qty: 20 CAPSULE | Refills: 0 | Status: SHIPPED | OUTPATIENT
Start: 2023-02-13 | End: 2023-02-23

## 2023-02-13 RX ORDER — FAMOTIDINE 20 MG/1
20 TABLET, FILM COATED ORAL 2 TIMES DAILY
Qty: 20 TABLET | Refills: 0 | Status: SHIPPED | OUTPATIENT
Start: 2023-02-13 | End: 2023-02-23

## 2023-02-13 RX ORDER — AMLODIPINE BESYLATE 10 MG/1
10 TABLET ORAL DAILY
Qty: 20 TABLET | Refills: 0 | Status: SHIPPED | OUTPATIENT
Start: 2023-02-13 | End: 2023-03-05

## 2023-02-13 RX ADMIN — DIPHENHYDRAMINE HYDROCHLORIDE 50 MG: 50 INJECTION, SOLUTION INTRAMUSCULAR; INTRAVENOUS at 00:14

## 2023-02-13 RX ADMIN — SODIUM CHLORIDE, PRESERVATIVE FREE 20 MG: 5 INJECTION INTRAVENOUS at 00:14

## 2023-02-13 RX ADMIN — METHYLPREDNISOLONE SODIUM SUCCINATE 125 MG: 125 INJECTION, POWDER, FOR SOLUTION INTRAMUSCULAR; INTRAVENOUS at 00:14

## 2023-02-13 NOTE — DISCHARGE INSTRUCTIONS
Thank You! It was a pleasure taking care of you in our Emergency Department today. We know that when you come to [facility], you are entrusting us with your health, comfort, and safety. Our physicians and nurses honor that trust, and truly appreciate the opportunity to care for you and your loved ones. We also value your feedback. If you receive a survey about your Emergency Department experience today, please fill it out. We care about our patients' feedback, and we listen to what you have to say. Thank you. Dr. Mago Barton M.D.      ________________________________________________________________________  I have included a copy of your lab results and/or radiologic studies from today's visit so you can have them easily available at your follow-up visit. We hope you feel better and please do not hesitate to contact the ED if you have any questions at all! No results found for this or any previous visit (from the past 12 hour(s)). No orders to display     CT Results  (Last 48 hours)      None          The exam and treatment you received in the Emergency Department were for an urgent problem and are not intended as complete care. It is important that you follow up with a doctor, nurse practitioner, or physician assistant for ongoing care. If your symptoms become worse or you do not improve as expected and you are unable to reach your usual health care provider, you should return to the Emergency Department. We are available 24 hours a day. Please take your discharge instructions with you when you go to your follow-up appointment. If a prescription has been provided, please have it filled as soon as possible to prevent a delay in treatment. Read the entire medication instruction sheet provided to you by the pharmacy.  If you have any questions or reservations about taking the medication due to side effects or interactions with other medications, please call your primary care physician or contact the ER to speak with the charge nurse. Please make an appointment with your family doctor or the physician you were referred to for follow-up of this visit as instructed on your discharge paperwork. Return to the ER if you are unable to be seen or if you are unable to be seen in a timely manner. Should you experience abdominal pain lasting greater than 6 hours, chest pain, difficulty breathing, fever/chills, numbness/tingling, skin changes or other symptoms that concern you, return to the ED sooner. If you feel worse over the next 24 hours, please return to the ED. We are available 24 hours a day. Thank you for trusting us with your care!

## 2023-02-13 NOTE — ED TRIAGE NOTES
Patient presents to triage ambulatory complaining of upper lip swelling. Patient denies any recent exposure to food. Patient does acknowledge taking lisinopril. Joyce Chan MD paged to triage.

## 2023-02-13 NOTE — ED PROVIDER NOTES
EMERGENCY DEPARTMENT HISTORY AND PHYSICAL EXAM            Please note that this dictation was completed with the assistance of \"Dragon\", the computer voice recognition software. Quite often unanticipated grammatical, syntax, homophones, and other interpretive errors are inadvertently transcribed by the computer software. Please disregard these errors and any errors that have escaped final proofreading. Thank you. Date of Evaluation: 02/13/23  Patient: Lex Anguiano  Patient Age and Sex: 56 y.o. male   MRN: [id number]  CSN: [id number]  PCP: Kev Wolfe MD    History of Present Illness     Chief Complaint   Patient presents with    Lip Swelling     History Provided By: Patient/family/EMS (if available)    HPI Limitations : None    HPI: Lex Anguiano, 56 y.o. male with past medical history as documented below presents to the ED with c/o of sudden onset of upper lip swelling onset this morning and has progressively gotten worse. Pt does take Lisinopril. No other new medications, soaps, lotions. No difficulty swallowing or voice changes. No SOB. Pt denies any other exacerbating or ameliorating factors. There are no other complaints, changes or physical findings pertinent to the HPI at this time. Nursing Notes were all reviewed and agreed with or any disagreements were addressed in the HPI.     Past History   Past Medical History:  Past Medical History:   Diagnosis Date    Cellulitis and abscess of trunk 7/31/2014    Chronic back pain greater than 3 months duration 7/31/2014    Diabetes ([address]     Family history of prostate cancer 7/31/2014    Hyperhydrosis disorder 7/31/2014    Hypertension     Intention tremor 12/9/2015    Prediabetes 6/25/2014    RA (rheumatoid arthritis) ([address] 7/31/2014    Rash of perineum 6/25/2014    Tinea manus 1/19/2016    Tinea pedis of both feet 1/19/2016    Tobacco abuse 6/25/2014       Past Surgical History:  Past Surgical History:   Procedure Laterality Date    HX ORTHOPAEDIC      right leg and knee       Family History:   Family history reviewed and was non-contributory, unless specified below:  Family History   Problem Relation Age of Onset    Diabetes Mother     Hypertension Mother     Stroke Mother     Diabetes Father     Hypertension Father        Social History:  Social History     Tobacco Use    Smoking status: Former     Packs/day: 1.00     Types: Cigarettes    Smokeless tobacco: Never   Substance Use Topics    Alcohol use: Yes     Comment: occasionally    Drug use: No       Allergies: Allergies   Allergen Reactions    Lisinopril Angioedema       Current Medications:  No current facility-administered medications on file prior to encounter. Current Outpatient Medications on File Prior to Encounter   Medication Sig Dispense Refill    EnbreL SureClick 50 mg/mL (1 mL) injection       hydrOXYchloroQUINE (PLAQUENIL) 200 mg tablet TAKE 2 TABLETS BY MOUTH DAILY 60 Tablet 1    diclofenac EC (VOLTAREN) 75 mg EC tablet TAKE 1 TABLET BY MOUTH TWICE A DAY 60 Tablet 1    [DISCONTINUED] lisinopriL (PRINIVIL, ZESTRIL) 20 mg tablet TAKE 1 TABLET BY MOUTH EVERY DAY 90 Tablet 1    predniSONE (DELTASONE) 5 mg tablet Take 1 Tablet by mouth two (2) times a day. (Patient not taking: Reported on 12/27/2022) 60 Tablet 1    predniSONE (STERAPRED DS) 10 mg dose pack Standard 6 day taper (Patient not taking: No sig reported) 21 Tablet 0    metFORMIN (GLUCOPHAGE) 1,000 mg tablet Take 1 Tablet by mouth two (2) times daily (with meals). 180 Tablet 3    rosuvastatin (CRESTOR) 10 mg tablet Take 1 Tablet by mouth nightly. (Patient not taking: No sig reported) 30 Tablet 3    ciclopirox (LOPROX) 0.77 % topical cream Apply  to affected area two (2) times a day. (Patient not taking: No sig reported) 90 g 3    sildenafil citrate (VIAGRA) 100 mg tablet Take 1 Tablet by mouth as needed for Erectile Dysfunction.  (Patient not taking: Reported on 12/27/2022) 12 Tablet 11    OTHER C-PAP MACHINE WHILE SLEEPING         Review of Systems   A complete ROS was reviewed by me today and all other systems negative, unless otherwise specified below:  Review of Systems   Constitutional: Negative. Negative for chills and fever. HENT:  Positive for facial swelling. Negative for congestion and sore throat. Eyes: Negative. Respiratory: Negative. Negative for cough, chest tightness, shortness of breath and wheezing. Cardiovascular: Negative. Negative for chest pain, palpitations and leg swelling. Gastrointestinal: Negative. Negative for abdominal distention, abdominal pain, blood in stool, constipation, diarrhea, nausea and vomiting. Endocrine: Negative. Genitourinary: Negative. Negative for difficulty urinating, dysuria, flank pain, frequency, hematuria and urgency. Musculoskeletal: Negative. Negative for back pain and neck stiffness. Skin: Negative. Negative for rash. Allergic/Immunologic: Negative. Neurological: Negative. Negative for dizziness, syncope, weakness, light-headedness, numbness and headaches. Hematological: Negative. Psychiatric/Behavioral: Negative. Negative for confusion and self-injury. Physical Exam   Patient Vitals for the past 24 hrs:   Temp Pulse Resp BP SpO2   02/13/23 0116 98.6 °F (37 °C) 80 18 (!) 149/97 93 %   02/13/23 0046 -- -- -- (!) 141/100 93 %   02/13/23 0035 -- -- -- -- 95 %   02/13/23 0001 98.4 °F (36.9 °C) 85 18 (!) 144/101 97 %       Physical Exam  Vitals and nursing note reviewed. Constitutional:       Appearance: He is well-developed. He is not toxic-appearing. HENT:      Head: Normocephalic and atraumatic. Comments: +upper lip edema  No intraoral involvement  Tongue mobile, floor of mouth soft  No trismus     Mouth/Throat:      Pharynx: No posterior oropharyngeal erythema. Eyes:      Conjunctiva/sclera: Conjunctivae normal.      Pupils: Pupils are equal, round, and reactive to light.    Cardiovascular:      Rate and Rhythm: Normal rate and regular rhythm. Heart sounds: Normal heart sounds. No murmur heard. No friction rub. No gallop. Pulmonary:      Effort: Pulmonary effort is normal. No respiratory distress. Breath sounds: Normal breath sounds. No wheezing or rales. Chest:      Chest wall: No tenderness. Abdominal:      General: Bowel sounds are normal. There is no distension. Palpations: Abdomen is soft. There is no mass. Tenderness: There is no abdominal tenderness. There is no guarding or rebound. Musculoskeletal:         General: No tenderness or deformity. Normal range of motion. Cervical back: Normal range of motion. Skin:     General: Skin is warm. Findings: No rash. Neurological:      Mental Status: He is alert and oriented to person, place, and time. Cranial Nerves: No cranial nerve deficit. Motor: No abnormal muscle tone. Coordination: Coordination normal.      Deep Tendon Reflexes: Reflexes normal.   Psychiatric:         Behavior: Behavior is cooperative. Diagnostic Studies     LABORATORY RESULTS:  I have personally reviewed and interpreted all available laboratory results. No results found for this or any previous visit (from the past 24 hour(s)). RADIOLOGY RESULTS:  I have personally reviewed and interpreted all available imaging studies and agree with radiology interpretation. No orders to display     CT Results  (Last 48 hours)      None          CXR Results  (Last 48 hours)      None          No orders to display        [address]   I am the first and primary ED physician for this patient's ED visit today. I reviewed our EMR for any past records that may contribute to the patient's current condition, including their past medical, surgical, social and family history. This also includes their most recent ED visits, previous hospitalizations and prior diagnostic data.  I have reviewed and summarized the most pertinent findings in my HPI and MDM.    Vital Signs Reviewed:  Patient Vitals for the past 24 hrs:   Temp Pulse Resp BP SpO2   02/13/23 0116 98.6 °F (37 °C) 80 18 (!) 149/97 93 %   02/13/23 0046 -- -- -- (!) 141/100 93 %   02/13/23 0035 -- -- -- -- 95 %   02/13/23 0001 98.4 °F (36.9 °C) 85 18 (!) 144/101 97 %     Pulse Oximetry Analysis: 97% on RA with good pleth    Cardiac Monitor:   Rate: 85 bpm  The cardiac monitor revealed the following rhythm as interpreted by me: Normal Sinus Rhythm  Cardiac monitoring was ordered to monitor patient for signs of cardiac dysrhythmia, which they are at risk for based on their history and/or risk for cardiovascular disease and/or metabolic abnormalities. Records Reviewed: Nursing Notes, Old Medical Records, Previous electrocardiograms, Previous Radiology Studies and Previous Laboratory Studies, EMS reports    DIFFERENTIAL DIAGNOSIS AND MDM:  Patient presents with angioedema of upper lip; Stable vitals and without significant airway compromise. DDx: allergic reaction, ACEi side effect, hereditary angioedema, contact dermatitis, idiopathic. Will try steroids, benadryl, Pepcid and continue to monitor here for exacerbation. May need intervention for airway protection, TXA or FFP. Reassess. Review of Prior Records and External Documents:   reviewed: YES - I have reviewed the patient's controlled substance prescription history thru the Prescription Monitoring Program so that the prescription(s) for a controlled substance can be given. Prior hospital discharge summaries and clinic notes reviewed: Reviewed PCP note 12/27/22, pt was sleep apnea and primary HTN and indeed taking Lisinopril. ED Course: Progress Notes, Reevaluation, and Consults:  Initial assessment performed. I discussed presenting problems and concerns, and my formulated plan for today's visit with the patient and any available family members. I have encouraged them to ask questions as they arise throughout the visit.      ED Physician Orders:   Orders Placed This Encounter    diphenhydrAMINE (BENADRYL) injection 50 mg    famotidine (PF) (PEPCID) 20 mg in 0.9% sodium chloride 10 mL injection    methylPREDNISolone (PF) (Solu-MEDROL) injection 125 mg    amLODIPine (Norvasc) 10 mg tablet    diphenhydrAMINE (BenadryL) 25 mg capsule    famotidine (Pepcid) 20 mg tablet    predniSONE (DELTASONE) 50 mg tablet     ED Medications Given:   Medications   diphenhydrAMINE (BENADRYL) injection 50 mg (50 mg IntraVENous Given 2/13/23 0014)   famotidine (PF) (PEPCID) 20 mg in 0.9% sodium chloride 10 mL injection (20 mg IntraVENous Given 2/13/23 0014)   methylPREDNISolone (PF) (Solu-MEDROL) injection 125 mg (125 mg IntraVENous Given 2/13/23 0014)     ED Physician Interpretation of Test Results: All results were independently reviewed and interpreted by myself, notably showing:     RADIOLOGY:  Non-plain film images such as CT, ultrasound and MRI are read by the radiologist. Plain radiographic images are visualized and preliminarily interpreted by the ED Provider with the below findings:     Imaging interpreted by me:     Interpretation per the Radiologist below, if available at the time of this note:     No orders to display     CT Results  (Last 48 hours)      None          CXR Results  (Last 48 hours)      None          No orders to display     Progress Note:  I have just re-evaluated the patient. Pt reports improvement of his symptoms. I have reviewed his vital signs and determined there is currently no worsening in their condition or physical exam. Results have been reviewed with them and their questions have been answered. I will continue to review further results as they come available.        Amount and/or Complexity of Data Reviewed  Medical Complexity: HIGH  Presentation: ACUTE and SEVERE  Clinical lab tests: ordered as appropriate & reviewed  Tests in the radiology section of CPT®: ordered as appropriate & reviewed  Tests in the medicine section of CPT®: ordered as appropriate & reviewed  Obtain history from someone other than the patient: yes  Review and summarize past medical records: yes  Independent visualization of images, tracings, or specimens: yes    Risks  OTC drugs. Prescription drug management. Shared Decision Making[de-identified] I considered ordering TXA and FFP for angioedema but pt had significant improvement of swelling after IV steroid, IV antihistamine blockade. We discussed discontinuing Lisinopril and I have started him on Norvasc 10 mg for BP control; pt to follow-up with PCP this week. HYPERTENSION COUNSELING  For 10 minutes, education was provided to the patient today regarding their hypertension. Patient is made aware of their elevated blood pressure and is instructed to follow up this week with their Primary Care for a recheck. Patient is counseled regarding consequences of chronic, uncontrolled hypertension including kidney disease, heart disease, stroke or even death. Patient states their understanding and agrees to follow up this week. Additionally, during their visit, I discussed sodium restriction, maintaining ideal body weight and regular exercise program as physiologic means to achieve blood pressure control. The patient will strive towards this. Consideration for admission/observation for: Angioedema, allergic reaction. I engaged patient in shared decision making and he feels significant improvement after ED treatment and is comfortable with discharge with outpatient treatment and PCP follow-up. DISCHARGE  Pt reassessed and symptoms noted to have improved significantly after ED treatment. Fernando Leblanc's labs and imaging have been reviewed with him and available family. He verbally conveys understanding and agreement of the signs, symptoms, diagnosis, treatment and prognosis and additionally agrees to follow up as recommended with Dr. Leidy Dowd MD and/or specialist as instructed.  He agrees with the care plan we have created and conveys that all of his questions have been answered. Additionally, I have put together a packet of discharge instructions for him that include: 1) Educational information regarding their diagnosis, 2) How to care for their diagnosis at home, as well a 3) List of reasons why they would want to return to the ED prior to their follow-up appointment should their condition change or symptoms worsen. I have answered all questions to the patient's satisfaction. Strict return precautions given. He and/or family conveyed understanding and agreement with care plan. Vital signs stable for discharge. PLAN  1. Return precautions as discussed with patient and available family/caregiver. 2.   Discharge Medication List as of 2/13/2023  1:21 AM        START taking these medications    Details   amLODIPine (Norvasc) 10 mg tablet Take 1 Tablet by mouth daily for 20 days. , Normal, Disp-20 Tablet, R-0      diphenhydrAMINE (BenadryL) 25 mg capsule Take 2 Capsules by mouth every six (6) hours as needed for Allergies for up to 10 days. , Normal, Disp-20 Capsule, R-0      famotidine (Pepcid) 20 mg tablet Take 1 Tablet by mouth two (2) times a day for 10 days. , Normal, Disp-20 Tablet, R-0      !! predniSONE (DELTASONE) 50 mg tablet Take 1 Tablet by mouth daily for 3 days. , Normal, Disp-3 Tablet, R-0       !! - Potential duplicate medications found. Please discuss with provider.         CONTINUE these medications which have NOT CHANGED    Details   EnbreL SureClick 50 mg/mL (1 mL) injection Historical Med, JOSEP      hydrOXYchloroQUINE (PLAQUENIL) 200 mg tablet TAKE 2 TABLETS BY MOUTH DAILY, Normal, Disp-60 Tablet, R-1      diclofenac EC (VOLTAREN) 75 mg EC tablet TAKE 1 TABLET BY MOUTH TWICE A DAY, Normal, Disp-60 Tablet, R-1      !! predniSONE (DELTASONE) 5 mg tablet Take 1 Tablet by mouth two (2) times a day., Normal, Disp-60 Tablet, R-1      predniSONE (STERAPRED DS) 10 mg dose pack Standard 6 day taper, Normal, Disp-21 Tablet, R-0      metFORMIN (GLUCOPHAGE) 1,000 mg tablet Take 1 Tablet by mouth two (2) times daily (with meals). , No Print, Disp-180 Tablet, R-3      rosuvastatin (CRESTOR) 10 mg tablet Take 1 Tablet by mouth nightly., Normal, Disp-30 Tablet, R-3      ciclopirox (LOPROX) 0.77 % topical cream Apply  to affected area two (2) times a day., Normal, Disp-90 g, R-3      sildenafil citrate (VIAGRA) 100 mg tablet Take 1 Tablet by mouth as needed for Erectile Dysfunction. , Normal, Disp-12 Tablet, R-11      OTHER C-PAP MACHINE WHILE SLEEPING, Historical Med       !! - Potential duplicate medications found. Please discuss with provider. STOP taking these medications       lisinopriL (PRINIVIL, ZESTRIL) 20 mg tablet Comments:   Reason for Stopping:             3.   Follow-up Information       Follow up With Specialties Details Why Contact Info    Estela Hunt 1574 [phone]      [facility] EMERGENCY DEPT Emergency Medicine   [address] [place] [address] [address]   [address] [address] [phone]          Instructed to return to ED if worse    FINAL DIAGNOSIS   Clinical Impression:  1. Angioedema, initial encounter    2. Swelling of upper lip    3. ACE inhibitor-aggravated angioedema, initial encounter    4. Accelerated hypertension      Attestation:  I am the attending of record for this patient. I personally performed the services described in this documentation on this date, 2/13/2023 for patientMaximilian. I have reviewed the chart and verified that the record is accurate and complete.       Mago Barton MD (Electronic Signature)

## 2023-02-14 NOTE — PROGRESS NOTES
Chief Complaint   Patient presents with    Joint Pain     1. Have you been to the ER, urgent care clinic since your last visit? Hospitalized since your last visit? Yes seen at the ER for dehydration, and shoulder pain    2. Have you seen or consulted any other health care providers outside of the [address] [place] [address] since your last visit? Include any pap smears or colon screening.  No [Negative] : Heme/Lymph

## 2023-04-03 ENCOUNTER — OFFICE VISIT (OUTPATIENT)
Dept: RHEUMATOLOGY | Age: 58
End: 2023-04-03
Payer: COMMERCIAL

## 2023-04-03 DIAGNOSIS — M05.741 RHEUMATOID ARTHRITIS INVOLVING BOTH HANDS WITH POSITIVE RHEUMATOID FACTOR (HCC): Primary | ICD-10-CM

## 2023-04-03 DIAGNOSIS — M05.742 RHEUMATOID ARTHRITIS INVOLVING BOTH HANDS WITH POSITIVE RHEUMATOID FACTOR (HCC): Primary | ICD-10-CM

## 2023-04-03 PROCEDURE — 3078F DIAST BP <80 MM HG: CPT | Performed by: PEDIATRICS

## 2023-04-03 PROCEDURE — 3074F SYST BP LT 130 MM HG: CPT | Performed by: PEDIATRICS

## 2023-04-03 PROCEDURE — 99215 OFFICE O/P EST HI 40 MIN: CPT | Performed by: PEDIATRICS

## 2023-04-03 NOTE — PROGRESS NOTES
RHEUMATOLOGY PROBLEM LIST AND CHIEF COMPLAINT  1. Rheumatoid Arthritis (2015) - Joint pain/swelling, synovitis in hands, wrists, elbows, rheumatoid nodules on elbows, morning stiffness. Elevated ESR, CCP, RF, CRP. Therapy History:  Previous DMARDs: MTX (Past-09/2016, 10/2016 - 12/2017; AEs- nodulosis)   Current DMARDs: Enbrel (11/2015-current), Plaquenil (2015 - 2017, 3/2018-current)  Pt reported COVID-19 vaccination status: Vaccinated. 2. Shoulder OA and rotator cuff disease    INTERVAL HISTORY  This is a 66 y.o.  male. Today, the patient complains of pain in the joints. Location: knee   Severity: 5 on a scale of 0-10  Timing: intermittent  Duration: several months  Context/Associated signs and symptoms: The patient has not been seen since 5/2022. At that time we continued him on Enbrel 50 mg weekly and Plaquenil 400 mg daily. He is still on Plaquenil, but ran out of refills of Enbrel several months ago due to . He reports worsening of joint symptoms since being off Enbrel. He notes continued nodulosis on his elbows.      RHEUMATOLOGY REVIEW OF SYSTEMS   Positives as per history  Negatives as follows:  Aldo Estrin:  Denies unexplained persistent fevers, weight change, chronic fatigue  HEAD/EYES:   Denies eye redness, blurry vision or sudden loss of vision, dry eyes, HA, temporal artery pain  ENT:    Denies oral/nasal ulcers, recurrent sinus infections, dry mouth  RESPIRATORY:  No pleuritic pain, history of pleural effusions, hemoptysis, exertional dyspnea  CARDIOVASCULAR:  Denies chest pain, history of pericardial effusions  GASTRO:   Denies heartburn, esophageal dysmotility, abdominal pain, nausea, vomiting, diarrhea, blood in the stool  HEMATOLOGIC:  No easy bruising, purpura, swollen lymph nodes  SKIN:    Denies alopecia, ulcers, nodules, sun sensitivity, unexplained persistent rash   VASCULAR:   Denies edema, cyanosis, raynaud phenomenon  NEUROLOGIC:  Denies specific muscle weakness, paresthesias   PSYCHIATRIC:  No sleep disturbance / snoring, depression, anxiety  MSK:    No morning stiffness >1 hour, SI joint pain, persistent joint swelling    PAST MEDICAL HISTORY  Reviewed with patient, significant changes in medical history - no    PHYSICAL EXAM  Blood pressure 130/88, pulse 95, temperature 98.3 °F (36.8 °C), temperature source Oral, resp. rate 16, weight 277 lb (125.6 kg), SpO2 92 %. GENERAL APPEARANCE: Well-nourished, no acute distress  NECK: No adenopathy  ENT: No oral ulcers  CARDIOVASCULAR: Heart rhythm is regular. No murmur, rub, gallop  CHEST: Normal vesicular breath sounds. No wheezes, rales, pleural friction rubs  ABDOMINAL: The abdomen is soft and nontender. Bowel sounds are normal  SKIN: No rash, palpable purpura, digital ulcer, abnormal thickening   MUSCULOSKELETAL:   Upper extremities -  2 Nodules noted over left elbow - unchanged. Rheumatoid nodule on b/l forearms near olecranon bursa. Decreased  strength. Some dROM of PIPs and MCPs. Lower extremities - B/L Knee crepitus, bony prominence (R>L), scar over right knee from previous surgery. LABS, RADIOLOGY AND PROCEDURES   Previous labs reviewed -Yes  Last TB 12/2017    ASSESSMENT  1. Rheumatoid Arthritis -(has worsened)- The patient's disease has worsened while off treatment. I recommend he restart Enbrel 50 mg weekly and continue on Plaquenil. Discussed adherence to office visits and lab work in order to obtain his medications in a timely manner to prevent worsening of his disease. Lab work ordered today. 2. Drug therapy monitoring for toxicity (methotrexate/Enbrel) - CBC, BUN, Cr, AST, ALT and albumin every 4 months  3. Osteoarthritis -(is unchanged)- He should continue with diclofenac PRN and weight loss. PLAN  1. Restart Enbrel 50 mg weekly - seek re-approval   2. Plaquenil 400 mg daily  3. Check CBC, CMP, TB, hepatitis ABs  4. Return in 4-5 months    Refugio Moore MD  Adult and Pediatric Rheumatology [facility] [employer] [facility], [address], Phone [phone], Fax [phone]    Visiting  of Pediatrics    Department of Pediatrics, [facility] of [address] [place] [address], [facility], Phone [phone], Fax [phone]    There are no Patient Instructions on file for this visit. cc:  Xenia Martell MD    I, Sunni Whitaker MD, personally performed the services described in the documentation as scribed by Elton Bell in my presence and have reviewed and agree with the note as scribed. Total time was 40 minutes - which was spent in preparing to see the patient, reviewing chart for past medical history, exam, evaluation, counseling and education, documentation and reviewing results.

## 2023-04-03 NOTE — PROGRESS NOTES
Chief Complaint   Patient presents with    Joint Pain     1. Have you been to the ER, urgent care clinic since your last visit? Hospitalized since your last visit? No    2. Have you seen or consulted any other health care providers outside of the [address] [place] [address] since your last visit? Include any pap smears or colon screening.  No

## 2023-04-06 ENCOUNTER — TELEPHONE (OUTPATIENT)
Dept: RHEUMATOLOGY | Age: 58
End: 2023-04-06

## 2023-04-06 NOTE — TELEPHONE ENCOUNTER
Patient is calling because he did his labs on Tuesday and would like to know when will her be able to get his medication Embrel, pt is aware that Hollis Pate is not in the office today.  Please advise [phone]

## 2023-04-27 DIAGNOSIS — E11.65 TYPE 2 DIABETES MELLITUS WITH HYPERGLYCEMIA, WITH LONG-TERM CURRENT USE OF INSULIN (HCC): ICD-10-CM

## 2023-04-27 DIAGNOSIS — Z79.4 TYPE 2 DIABETES MELLITUS WITH HYPERGLYCEMIA, WITH LONG-TERM CURRENT USE OF INSULIN (HCC): ICD-10-CM

## 2023-04-27 DIAGNOSIS — I10 HTN, GOAL BELOW 140/90: ICD-10-CM

## 2023-04-27 RX ORDER — DICLOFENAC SODIUM 75 MG/1
TABLET, DELAYED RELEASE ORAL
Qty: 60 TABLET | Refills: 1 | Status: SHIPPED | OUTPATIENT
Start: 2023-04-27

## 2023-05-02 DIAGNOSIS — M05.741 RHEUMATOID ARTHRITIS INVOLVING BOTH HANDS WITH POSITIVE RHEUMATOID FACTOR (HCC): ICD-10-CM

## 2023-05-02 DIAGNOSIS — M05.742 RHEUMATOID ARTHRITIS INVOLVING BOTH HANDS WITH POSITIVE RHEUMATOID FACTOR (HCC): ICD-10-CM

## 2023-05-04 RX ORDER — HYDROXYCHLOROQUINE SULFATE 200 MG/1
400 TABLET, FILM COATED ORAL DAILY
Qty: 60 TABLET | Refills: 1 | Status: SHIPPED | OUTPATIENT
Start: 2023-05-04

## 2023-05-04 RX ORDER — CHLORTHALIDONE 25 MG/1
25 TABLET ORAL DAILY
Qty: 90 TABLET | Refills: 0 | Status: SHIPPED | OUTPATIENT
Start: 2023-05-04

## 2023-05-24 DIAGNOSIS — M05.741 RHEUMATOID ARTHRITIS WITH RHEUMATOID FACTOR OF RIGHT HAND WITHOUT ORGAN OR SYSTEMS INVOLVEMENT (HCC): ICD-10-CM

## 2023-05-25 NOTE — TELEPHONE ENCOUNTER
Last appointment: 1/21/22  Next appointment: 6/2/23  Previous refill encounter(s): 5/4/23 [facility] #90, Plaquenil #60 with 1 refill, 1/21/22 Glucophage #180 with 3 refills    Requested Prescriptions     Pending Prescriptions Disp Refills    hydroxychloroquine (PLAQUENIL) 200 MG tablet [Pharmacy Med Name: HYDROXYCHLOROQUINE 200 MG TAB] 180 tablet 3     Sig: TAKE 2 TABLETS BY MOUTH DAILY    metFORMIN (GLUCOPHAGE) 1000 MG tablet [Pharmacy Med Name: METFORMIN HCL 1,000 MG TABLET] 180 tablet 3     Sig: TAKE 1 TABLET BY MOUTH TWICE DAILY WITH A MEAL    chlorthalidone (HYGROTON) 25 MG tablet [Pharmacy Med Name: CHLORTHALIDONE 25 MG TABLET] 90 tablet 3     Sig: TAKE 1 TABLET BY MOUTH [address] Only    Program: Medication Refill  CPA in place:    Recommendation Provided To:    Intervention Detail: New Rx: 3, reason: Patient Preference  Intervention Accepted By:   Mercedes Connors Closed?:    Time Spent (min): 5

## 2023-05-26 RX ORDER — CHLORTHALIDONE 25 MG/1
TABLET ORAL
Qty: 90 TABLET | Refills: 3 | Status: SHIPPED | OUTPATIENT
Start: 2023-05-26

## 2023-05-26 RX ORDER — HYDROXYCHLOROQUINE SULFATE 200 MG/1
TABLET, FILM COATED ORAL
Qty: 180 TABLET | Refills: 3 | Status: SHIPPED | OUTPATIENT
Start: 2023-05-26

## 2023-06-10 ENCOUNTER — HOSPITAL ENCOUNTER (EMERGENCY)
Facility: HOSPITAL | Age: 58
Discharge: HOME | End: 2023-06-10
Attending: EMERGENCY MEDICINE
Payer: COMMERCIAL

## 2023-06-10 VITALS
TEMPERATURE: 97.7 F | HEIGHT: 67 IN | OXYGEN SATURATION: 98 % | DIASTOLIC BLOOD PRESSURE: 92 MMHG | HEART RATE: 90 BPM | SYSTOLIC BLOOD PRESSURE: 128 MMHG | BODY MASS INDEX: 41.8 KG/M2 | WEIGHT: 266.32 LBS | RESPIRATION RATE: 18 BRPM

## 2023-06-10 DIAGNOSIS — E11.65 TYPE 2 DIABETES MELLITUS WITH HYPERGLYCEMIA, WITHOUT LONG-TERM CURRENT USE OF INSULIN (HCC): ICD-10-CM

## 2023-06-10 DIAGNOSIS — R73.9 HYPERGLYCEMIA WITHOUT KETOSIS: Primary | ICD-10-CM

## 2023-06-10 LAB
ANION GAP SERPL CALC-SCNC: 9 MMOL/L (ref 5–15)
BASOPHILS # BLD: 0.1 K/UL (ref 0–0.1)
BASOPHILS NFR BLD: 1 % (ref 0–1)
BUN SERPL-MCNC: 21 MG/DL (ref 6–20)
BUN/CREAT SERPL: 16 (ref 12–20)
CALCIUM SERPL-MCNC: 8.9 MG/DL (ref 8.5–10.1)
CHLORIDE SERPL-SCNC: 99 MMOL/L (ref 97–108)
CO2 SERPL-SCNC: 25 MMOL/L (ref 21–32)
CREAT SERPL-MCNC: 1.3 MG/DL (ref 0.7–1.3)
DIFFERENTIAL METHOD BLD: ABNORMAL
EOSINOPHIL # BLD: 0.1 K/UL (ref 0–0.4)
EOSINOPHIL NFR BLD: 2 % (ref 0–7)
ERYTHROCYTE [DISTWIDTH] IN BLOOD BY AUTOMATED COUNT: 13.9 % (ref 11.5–14.5)
GLUCOSE BLD STRIP.AUTO-MCNC: 279 MG/DL (ref 65–117)
GLUCOSE BLD STRIP.AUTO-MCNC: 423 MG/DL (ref 65–117)
GLUCOSE SERPL-MCNC: 407 MG/DL (ref 65–100)
HCT VFR BLD AUTO: 45.1 % (ref 36.6–50.3)
HGB BLD-MCNC: 15.2 G/DL (ref 12.1–17)
IMM GRANULOCYTES # BLD AUTO: 0 K/UL (ref 0–0.04)
IMM GRANULOCYTES NFR BLD AUTO: 0 % (ref 0–0.5)
LYMPHOCYTES # BLD: 3 K/UL (ref 0.8–3.5)
LYMPHOCYTES NFR BLD: 36 % (ref 12–49)
MCH RBC QN AUTO: 26.9 PG (ref 26–34)
MCHC RBC AUTO-ENTMCNC: 33.7 G/DL (ref 30–36.5)
MCV RBC AUTO: 79.7 FL (ref 80–99)
MONOCYTES # BLD: 0.6 K/UL (ref 0–1)
MONOCYTES NFR BLD: 7 % (ref 5–13)
NEUTS SEG # BLD: 4.5 K/UL (ref 1.8–8)
NEUTS SEG NFR BLD: 54 % (ref 32–75)
NRBC # BLD: 0 K/UL (ref 0–0.01)
NRBC BLD-RTO: 0 PER 100 WBC
PLATELET # BLD AUTO: 282 K/UL (ref 150–400)
PMV BLD AUTO: 10.9 FL (ref 8.9–12.9)
POTASSIUM SERPL-SCNC: 3.7 MMOL/L (ref 3.5–5.1)
RBC # BLD AUTO: 5.66 M/UL (ref 4.1–5.7)
SERVICE CMNT-IMP: ABNORMAL
SERVICE CMNT-IMP: ABNORMAL
SODIUM SERPL-SCNC: 133 MMOL/L (ref 136–145)
WBC # BLD AUTO: 8.2 K/UL (ref 4.1–11.1)

## 2023-06-10 PROCEDURE — 99284 EMERGENCY DEPT VISIT MOD MDM: CPT

## 2023-06-10 PROCEDURE — 96374 THER/PROPH/DIAG INJ IV PUSH: CPT

## 2023-06-10 PROCEDURE — 36415 COLL VENOUS BLD VENIPUNCTURE: CPT

## 2023-06-10 PROCEDURE — 6370000000 HC RX 637 (ALT 250 FOR IP): Performed by: EMERGENCY MEDICINE

## 2023-06-10 PROCEDURE — 85025 COMPLETE CBC W/AUTO DIFF WBC: CPT

## 2023-06-10 PROCEDURE — 80048 BASIC METABOLIC PNL TOTAL CA: CPT

## 2023-06-10 PROCEDURE — 82962 GLUCOSE BLOOD TEST: CPT

## 2023-06-10 RX ADMIN — Medication 10 UNITS: at 20:07

## 2023-06-10 ASSESSMENT — PAIN - FUNCTIONAL ASSESSMENT: PAIN_FUNCTIONAL_ASSESSMENT: 0-10

## 2023-06-10 ASSESSMENT — LIFESTYLE VARIABLES
HOW OFTEN DO YOU HAVE A DRINK CONTAINING ALCOHOL: 2-4 TIMES A MONTH
HOW MANY STANDARD DRINKS CONTAINING ALCOHOL DO YOU HAVE ON A TYPICAL DAY: 1 OR 2

## 2023-06-10 ASSESSMENT — ENCOUNTER SYMPTOMS
SHORTNESS OF BREATH: 0
ABDOMINAL PAIN: 0

## 2023-06-10 ASSESSMENT — PAIN SCALES - GENERAL: PAINLEVEL_OUTOF10: 0

## 2023-09-30 ENCOUNTER — TELEPHONE (OUTPATIENT)
Age: 58
End: 2023-09-30

## 2023-09-30 NOTE — TELEPHONE ENCOUNTER
Patient scheduled for 11/3 PAP follow up but provider out of office this day. Mailbox full, unable to leave message. Rescheduled patient to 10/30 @ 11:50 am and mailed letter.

## 2023-11-15 ENCOUNTER — TELEPHONE (OUTPATIENT)
Age: 58
End: 2023-11-15

## 2023-11-15 NOTE — TELEPHONE ENCOUNTER
A voicemail from patient was received on the lab extension requesting an appointment. He has lost almost 60 lbs.

## 2023-11-27 ENCOUNTER — TELEPHONE (OUTPATIENT)
Age: 58
End: 2023-11-27

## 2023-11-27 NOTE — TELEPHONE ENCOUNTER
Patient called wanted a copy of Sleep Study Results from 11/18/2019 sent via patient's email on file.

## 2024-01-17 ENCOUNTER — CLINICAL DOCUMENTATION (OUTPATIENT)
Age: 59
End: 2024-01-17

## 2024-01-17 ENCOUNTER — OFFICE VISIT (OUTPATIENT)
Age: 59
End: 2024-01-17
Payer: COMMERCIAL

## 2024-01-17 VITALS
HEART RATE: 92 BPM | OXYGEN SATURATION: 97 % | SYSTOLIC BLOOD PRESSURE: 133 MMHG | TEMPERATURE: 98.3 F | HEIGHT: 67 IN | WEIGHT: 256.3 LBS | DIASTOLIC BLOOD PRESSURE: 89 MMHG | BODY MASS INDEX: 40.23 KG/M2

## 2024-01-17 DIAGNOSIS — G47.33 OBSTRUCTIVE SLEEP APNEA (ADULT) (PEDIATRIC): Primary | ICD-10-CM

## 2024-01-17 DIAGNOSIS — I10 ESSENTIAL (PRIMARY) HYPERTENSION: ICD-10-CM

## 2024-01-17 PROCEDURE — 3075F SYST BP GE 130 - 139MM HG: CPT | Performed by: NURSE PRACTITIONER

## 2024-01-17 PROCEDURE — 3079F DIAST BP 80-89 MM HG: CPT | Performed by: NURSE PRACTITIONER

## 2024-01-17 PROCEDURE — 99214 OFFICE O/P EST MOD 30 MIN: CPT | Performed by: NURSE PRACTITIONER

## 2024-01-17 ASSESSMENT — SLEEP AND FATIGUE QUESTIONNAIRES
HOW LIKELY ARE YOU TO NOD OFF OR FALL ASLEEP WHILE SITTING QUIETLY AFTER LUNCH WITHOUT ALCOHOL: 0
ESS TOTAL SCORE: 1
HOW LIKELY ARE YOU TO NOD OFF OR FALL ASLEEP WHEN YOU ARE A PASSENGER IN A CAR FOR AN HOUR WITHOUT A BREAK: 0
HOW LIKELY ARE YOU TO NOD OFF OR FALL ASLEEP WHILE SITTING AND READING: 0
HOW LIKELY ARE YOU TO NOD OFF OR FALL ASLEEP WHILE SITTING INACTIVE IN A PUBLIC PLACE: 0
HOW LIKELY ARE YOU TO NOD OFF OR FALL ASLEEP WHILE SITTING AND TALKING TO SOMEONE: 0
HOW LIKELY ARE YOU TO NOD OFF OR FALL ASLEEP IN A CAR, WHILE STOPPED FOR A FEW MINUTES IN TRAFFIC: 0
HOW LIKELY ARE YOU TO NOD OFF OR FALL ASLEEP WHILE WATCHING TV: 0
HOW LIKELY ARE YOU TO NOD OFF OR FALL ASLEEP WHILE LYING DOWN TO REST IN THE AFTERNOON WHEN CIRCUMSTANCES PERMIT: 1

## 2024-01-17 NOTE — PROGRESS NOTES
[address]   [place] [address]   Tel.  [phone]   Fax. [phone]  [address]   [place] [address]   Tel.  [phone]   Fax. [phone] [address] [place] [address]   Tel.  [phone]   Fax. [phone]     Michael Rojas (: 1965) is a 58 y.o. male, established patient, seen for positive airway pressure follow-up and evaluation.  He was last seen by NP Bere Burns on 2022, previously seen by Dr. Zimmer on 2019,  prior notes and sleep testing reviewed in detail.  Home sleep test 2019 showed AHI of 79.3/hr with a lowest SaO2 of 65%. duration of SpO2 < 88% 138.9 min.  Weight at time of sleep testing 292 pounds.  CPAP assistance program used to set up device.     ASSESSMENT/PLAN:   Diagnosis Orders   1. Obstructive sleep apnea (adult) (pediatric)  DME Order for (Specify) as OP      2. Essential (primary) hypertension        3. Body mass index (BMI) 40.0-44.9, adult (HCC)            AHI = 79.3(2019).  On Resmed APAP :  5-20 cmH2O. Set up through CPAP assistance program in 12/10/2019.     He is adherent with PAP therapy and PAP continues to benefit patient and remains necessary for control of his sleep apnea. He is interested in a new device so that he is able to access data remotely and also be able to have a backup device in his truck in case he is not able to get home to use main device.    Follow-up and Dispositions    Return in about 3 months (around 2024) for First adherence visit 31-90 days after new setup.         Sleep Apnea well controlled, continue with current pressures at APAP 5-20 cmH2O.    * He is interested in a new device and is eligible through insurance    Orders Placed This Encounter   Procedures    DME Order for (Specify) as OP     Diagnosis: (G47.33) SHAWNA (obstructive sleep apnea)  (primary encounter diagnosis)     New APAP device needed, current device has exceeded its life expectancy

## 2024-04-23 ENCOUNTER — TELEPHONE (OUTPATIENT)
Age: 59
End: 2024-04-23

## 2024-04-23 NOTE — TELEPHONE ENCOUNTER
LVM to notify the patient that his appointment with Blossom Chang NP on 4/26/24 has been cancelled.  As he has not been set up on PAP.  Return call requested to schedule adherence visit upon PAP set up.

## 2025-04-23 ENCOUNTER — HOSPITAL ENCOUNTER (EMERGENCY)
Facility: HOSPITAL | Age: 60
Discharge: HOME | End: 2025-04-23

## 2025-04-23 ENCOUNTER — APPOINTMENT (OUTPATIENT)
Facility: HOSPITAL | Age: 60
End: 2025-04-23

## 2025-04-23 VITALS
TEMPERATURE: 98.6 F | DIASTOLIC BLOOD PRESSURE: 94 MMHG | OXYGEN SATURATION: 92 % | HEART RATE: 81 BPM | RESPIRATION RATE: 16 BRPM | BODY MASS INDEX: 40.81 KG/M2 | SYSTOLIC BLOOD PRESSURE: 160 MMHG | WEIGHT: 260 LBS | HEIGHT: 67 IN

## 2025-04-23 DIAGNOSIS — M79.671 RIGHT FOOT PAIN: Primary | ICD-10-CM

## 2025-04-23 PROCEDURE — 73660 X-RAY EXAM OF TOE(S): CPT

## 2025-04-23 PROCEDURE — 99283 EMERGENCY DEPT VISIT LOW MDM: CPT

## 2025-04-23 RX ORDER — NAPROXEN 500 MG/1
500 TABLET ORAL 2 TIMES DAILY
Qty: 60 TABLET | Refills: 0 | Status: SHIPPED | OUTPATIENT
Start: 2025-04-23

## 2025-04-23 ASSESSMENT — LIFESTYLE VARIABLES
HOW MANY STANDARD DRINKS CONTAINING ALCOHOL DO YOU HAVE ON A TYPICAL DAY: 1 OR 2
HOW OFTEN DO YOU HAVE A DRINK CONTAINING ALCOHOL: MONTHLY OR LESS

## 2025-04-23 ASSESSMENT — PAIN - FUNCTIONAL ASSESSMENT: PAIN_FUNCTIONAL_ASSESSMENT: 0-10

## 2025-04-23 ASSESSMENT — PAIN SCALES - GENERAL: PAINLEVEL_OUTOF10: 0

## 2025-04-23 NOTE — ED PROVIDER NOTES
any previous visit (from the past 12 hours).    EKG: When ordered, EKG's are interpreted by the Emergency Department Physician in the absence of a cardiologist.  Please see their note for interpretation of EKG.      RADIOLOGY:  Non-plain film images such as CT, Ultrasound and MRI are read by the radiologist. Plain radiographic images are visualized and preliminarily interpreted by the ED Provider with the below findings:       Interpretation per the Radiologist below, if available at the time of this note:     XR TOE RIGHT (MIN 2 VIEWS)   Final Result      No appreciable acute fracture or dislocation. Diffuse soft tissue swelling..   Irregularity of the toenail.          Electronically signed by LUIS BEAR           PROCEDURES   Unless otherwise noted below, none  Procedures     CRITICAL CARE TIME       EMERGENCY DEPARTMENT COURSE and DIFFERENTIAL DIAGNOSIS/MDM   Vitals:    Vitals:    04/23/25 0846 04/23/25 0848   BP:  (!) 160/94   Pulse:  81   Resp:  16   Temp:  98.6 °F (37 °C)   SpO2:  92%   Weight: 117.9 kg (260 lb)    Height: 1.702 m (5' 7\")         Patient was given the following medications:  Medications - No data to display    CONSULTS: (Who and What was discussed)  None    Chronic Conditions: See above    Social Determinants affecting Dx or Tx: None    Records Reviewed (source and summary of external records): Nursing Notes    MDM: CC/HPI Summary, DDx, ED Course, and Reassessment. Disposition Considerations (Tests not done, Shared Decision Making, Pt Expectation of Test or Tx.):   Patient is a 60-year-old male presenting to the emergency department with foot pain.  Has started to wear new work boots.  No signs of infection.  Neurovascularly intact.  X-ray no evidence of an acute finding present.  Will give him podiatry.  He has not taken anything for pain.  Denies any known issues with his kidneys.  We will try him on a course of Naprosyn.  Recommend obtaining new shoes.  Worsening of symptoms patient is

## 2025-05-07 ENCOUNTER — APPOINTMENT (OUTPATIENT)
Facility: HOSPITAL | Age: 60
End: 2025-05-07

## 2025-05-07 ENCOUNTER — HOSPITAL ENCOUNTER (EMERGENCY)
Facility: HOSPITAL | Age: 60
Discharge: HOME | End: 2025-05-07

## 2025-05-07 VITALS
DIASTOLIC BLOOD PRESSURE: 103 MMHG | TEMPERATURE: 98.7 F | HEART RATE: 89 BPM | RESPIRATION RATE: 18 BRPM | SYSTOLIC BLOOD PRESSURE: 166 MMHG | OXYGEN SATURATION: 93 % | WEIGHT: 261.47 LBS | HEIGHT: 67 IN | BODY MASS INDEX: 41.04 KG/M2

## 2025-05-07 DIAGNOSIS — S61.211A LACERATION OF LEFT INDEX FINGER WITHOUT FOREIGN BODY WITHOUT DAMAGE TO NAIL, INITIAL ENCOUNTER: Primary | ICD-10-CM

## 2025-05-07 PROCEDURE — 6370000000 HC RX 637 (ALT 250 FOR IP)

## 2025-05-07 PROCEDURE — 99283 EMERGENCY DEPT VISIT LOW MDM: CPT

## 2025-05-07 PROCEDURE — 6360000002 HC RX W HCPCS

## 2025-05-07 PROCEDURE — 73140 X-RAY EXAM OF FINGER(S): CPT

## 2025-05-07 PROCEDURE — 12001 RPR S/N/AX/GEN/TRNK 2.5CM/<: CPT

## 2025-05-07 RX ORDER — NEOMYCIN/BACITRACIN/POLYMYXINB 3.5-400-5K
OINTMENT (GRAM) TOPICAL
Status: COMPLETED | OUTPATIENT
Start: 2025-05-07 | End: 2025-05-07

## 2025-05-07 RX ORDER — BUPIVACAINE HYDROCHLORIDE 5 MG/ML
5 INJECTION, SOLUTION EPIDURAL; INTRACAUDAL; PERINEURAL
Status: COMPLETED | OUTPATIENT
Start: 2025-05-07 | End: 2025-05-07

## 2025-05-07 RX ORDER — CHLORTHALIDONE 25 MG/1
25 TABLET ORAL DAILY
Qty: 30 TABLET | Refills: 0 | Status: SHIPPED | OUTPATIENT
Start: 2025-05-07

## 2025-05-07 RX ORDER — LIDOCAINE HYDROCHLORIDE 20 MG/ML
5 INJECTION, SOLUTION EPIDURAL; INFILTRATION; INTRACAUDAL; PERINEURAL
Status: COMPLETED | OUTPATIENT
Start: 2025-05-07 | End: 2025-05-07

## 2025-05-07 RX ORDER — IBUPROFEN 600 MG/1
600 TABLET, FILM COATED ORAL
Status: COMPLETED | OUTPATIENT
Start: 2025-05-07 | End: 2025-05-07

## 2025-05-07 RX ORDER — CEPHALEXIN 500 MG/1
500 CAPSULE ORAL 4 TIMES DAILY
Qty: 28 CAPSULE | Refills: 0 | Status: SHIPPED | OUTPATIENT
Start: 2025-05-07 | End: 2025-05-14

## 2025-05-07 RX ORDER — ACETAMINOPHEN 500 MG
1000 TABLET ORAL
Status: COMPLETED | OUTPATIENT
Start: 2025-05-07 | End: 2025-05-07

## 2025-05-07 RX ADMIN — IBUPROFEN 600 MG: 600 TABLET, FILM COATED ORAL at 20:29

## 2025-05-07 RX ADMIN — LIDOCAINE HYDROCHLORIDE 5 ML: 20 INJECTION, SOLUTION EPIDURAL; INFILTRATION; INTRACAUDAL; PERINEURAL at 21:46

## 2025-05-07 RX ADMIN — BACITRACIN ZINC, NEOMYCIN, POLYMYXIN B SULFAT: 5000; 3.5; 4 OINTMENT TOPICAL at 21:46

## 2025-05-07 RX ADMIN — ACETAMINOPHEN 1000 MG: 500 TABLET ORAL at 20:29

## 2025-05-07 RX ADMIN — BUPIVACAINE HYDROCHLORIDE 25 MG: 5 INJECTION, SOLUTION EPIDURAL; INTRACAUDAL; PERINEURAL at 21:46

## 2025-05-07 ASSESSMENT — LIFESTYLE VARIABLES
HOW MANY STANDARD DRINKS CONTAINING ALCOHOL DO YOU HAVE ON A TYPICAL DAY: PATIENT DOES NOT DRINK
HOW OFTEN DO YOU HAVE A DRINK CONTAINING ALCOHOL: NEVER

## 2025-05-07 ASSESSMENT — PAIN DESCRIPTION - LOCATION: LOCATION: FINGER (COMMENT WHICH ONE)

## 2025-05-07 ASSESSMENT — PAIN SCALES - GENERAL: PAINLEVEL_OUTOF10: 3

## 2025-05-07 NOTE — ED TRIAGE NOTES
Patient ambulatory to triage for L index finger injury/laceration. Patient was working on commercial lawnmower and caught his finger between the engine and legs of the mower.

## 2025-05-08 NOTE — ED PROVIDER NOTES
[facility] EMERGENCY DEPT  EMERGENCY DEPARTMENT HISTORY AND PHYSICAL EXAM      Date of evaluation: 5/7/2025  Patient Name: Michael Rojas  Birthdate 1965  MRN: [id number]  ED Provider: JEANNETTE Valadez NP   Note Started: 8:21 PM EDT 5/7/25    HISTORY OF PRESENT ILLNESS     Chief Complaint   Patient presents with    Laceration       History Provided By: Patient, only     HPI: Michael Rojas is a 60 y.o. male with past medical history as listed below, presents ambulatory into the emergency room accompanied by his significant other with complaints of injury/laceration to left index finger.  Injury acquired just prior to arrival, stating he got his finger caught in between 2 pieces equipment while working on his vehicle.  No pain medication taken prior to arrival.  Unsure as to when he received his last tetanus vaccine.  No other injuries reported, patient otherwise in his usual state of health. Upon arrival to the ED pt is alert and oriented x 3, well-appearing, and interacting appropriately; no obvious distress noted.    PAST MEDICAL HISTORY   Past Medical History:  Past Medical History:   Diagnosis Date    Cellulitis and abscess of trunk 7/31/2014    Chronic back pain greater than 3 months duration 7/31/2014    Diabetes ([facility])     Family history of prostate cancer 7/31/2014    Hyperhydrosis disorder 7/31/2014    Hypertension     Intention tremor 12/9/2015    Prediabetes 6/25/2014    RA (rheumatoid arthritis) ([facility]) 7/31/2014    Rash of perineum 6/25/2014    Tinea manus 1/19/2016    Tinea pedis of both feet 1/19/2016    Tobacco abuse 6/25/2014       Past Surgical History:  Past Surgical History:   Procedure Laterality Date    ORTHOPEDIC SURGERY      right leg and knee       Family History:  Family History   Problem Relation Age of Onset    Diabetes Father     Stroke Mother     Hypertension Mother     Diabetes Mother     Hypertension Father        Social History:  Social History     Tobacco Use    Smoking  Not Applicable..     Interpretation per the Radiologist below, if available at the time of this note:  XR FINGER LEFT (MIN 2 VIEWS)   Final Result   No acute abnormality of the left index finger. Please note: The left third   finger was requested, but the left index finger was imaged. If there are   symptoms in the left third finger, a three-view examination of the left third   finger would be recommended..         Electronically signed by YOEL MADDOX           Records Reviewed: Not Applicable    MEDICAL DECISION MAKING and ED COURSE   7:36 PM Differential and Considerations of tests not ordered:     Patient presents with laceration/injury to left index finger.  Small skin avulsion noted, do not think be able to completely close the wound, but will try to better approximate.  Will place 1 subcutaneous sutures there is an area of bleeding.  Will place in a finger splint as to prevent him from dehiscing wound.  Will perform x-ray to evaluate for fracture.  As he is diabetic, will err on the side of caution and discharged with prescription for antibiotics.  Will also update his tetanus vaccine in the ED.     Vitals:    Vitals:    05/07/25 1945   BP: (!) 166/103   Pulse: 89   Resp: 18   Temp: 98.7 °F (37.1 °C)   TempSrc: Oral   SpO2: 93%   Weight: 118.6 kg (261 lb 7.5 oz)   Height: 1.702 m (5' 7\")       ED COURSE  ED Course as of 05/09/25 1936   Wed May 07, 2025   2100 X-ray negative for fracture.  Discussed with patient.  Will repair wound with sutures and placed a finger splint. [LM]      ED Course User Index  [LM] Marni Turner APRN - NP       Clinical Management Tools:  Not Applicable    Smoking Cessation: Not Applicable    Patient was given the following medications:  Medications   neomycin-bacitracin-polymyxin (NEOSPORIN) ointment ( Topical Given 5/7/25 2146)   acetaminophen (TYLENOL) tablet 1,000 mg (1,000 mg Oral Given 5/7/25 2029)   ibuprofen (ADVIL;MOTRIN) tablet 600 mg (600 mg Oral Given 5/7/25 2029)

## 2025-05-08 NOTE — DISCHARGE INSTRUCTIONS
You can take Tylenol 1000 mg (2 extra strength tablets) and ibuprofen 600 mg (3 tablets) at the same time every 6 hours as needed for pain.  Be sure to take this medication with food as it could cause some stomach irritation when taken on an empty stomach.     Do not use alcohol or peroxide to clean your wound, as this can cause delayed wound healing.  Only use mild soap, such as Dial, and water to clean the area at least once per day.  Pat the area dry with a clean towel then apply a thin layer of over-the-counter topical antibiotic ointment and cover with a clean bandage.                     Thank you for choosing our Emergency Department for your care.  It is our privilege to care for you in your time of need.  In the next several days, you may receive a survey via email or mailed to your home about your experience with our team.  We would greatly appreciate you taking a few minutes to complete the survey, as we use this information to learn what we have done well and what we could be doing better. Thank you for trusting us with your care!    Below you will find a list of your tests from today's visit.   Labs and Radiology Studies  No results found for this or any previous visit (from the past 12 hours).  XR FINGER LEFT (MIN 2 VIEWS)  Result Date: 5/7/2025  INDICATION: Injury to left middle phalanx. COMPARISON: None. TECHNIQUE: Three views of the left second finger FINDINGS: There is no fracture or other acute osseous or articular abnormality of the left index finger.. The soft tissues are within normal limits.     No acute abnormality of the left index finger. Please note: The left third finger was requested, but the left index finger was imaged. If there are symptoms in the left third finger, a three-view examination of the left third finger would be recommended.. Electronically signed by YOEL

## 2025-05-28 ENCOUNTER — HOSPITAL ENCOUNTER (EMERGENCY)
Facility: HOSPITAL | Age: 60
Discharge: HOME | End: 2025-05-28

## 2025-05-28 VITALS
BODY MASS INDEX: 40.59 KG/M2 | DIASTOLIC BLOOD PRESSURE: 111 MMHG | TEMPERATURE: 98.1 F | SYSTOLIC BLOOD PRESSURE: 159 MMHG | WEIGHT: 258.6 LBS | RESPIRATION RATE: 18 BRPM | OXYGEN SATURATION: 95 % | HEART RATE: 92 BPM | HEIGHT: 67 IN

## 2025-05-28 DIAGNOSIS — Z48.02 ENCOUNTER FOR REMOVAL OF SUTURES: Primary | ICD-10-CM

## 2025-05-28 ASSESSMENT — PAIN - FUNCTIONAL ASSESSMENT: PAIN_FUNCTIONAL_ASSESSMENT: 0-10

## 2025-05-28 ASSESSMENT — PAIN DESCRIPTION - DESCRIPTORS: DESCRIPTORS: ACHING;SORE

## 2025-05-28 ASSESSMENT — PAIN DESCRIPTION - LOCATION: LOCATION: FINGER (COMMENT WHICH ONE)

## 2025-05-28 ASSESSMENT — PAIN DESCRIPTION - FREQUENCY: FREQUENCY: CONTINUOUS

## 2025-05-28 ASSESSMENT — PAIN SCALES - GENERAL: PAINLEVEL_OUTOF10: 3

## 2025-05-28 ASSESSMENT — PAIN DESCRIPTION - ONSET: ONSET: GRADUAL

## 2025-05-28 ASSESSMENT — PAIN DESCRIPTION - ORIENTATION: ORIENTATION: LEFT

## 2025-05-28 ASSESSMENT — PAIN DESCRIPTION - PAIN TYPE: TYPE: ACUTE PAIN

## 2025-05-29 NOTE — ED PROVIDER NOTES
[facility] EMERGENCY DEPARTMENT  EMERGENCY DEPARTMENT ENCOUNTER       Pt Name: Michael Rojas  MRN: [id number]  Birthdate 1965  Date of evaluation: 5/28/2025  Provider: JEANNETTE Braden - CHRISTOPH   PCP: Jai Haider MD  Note Started: 12:55 AM 5/29/25     CHIEF COMPLAINT       Chief Complaint   Patient presents with    Suture / Staple Removal     Ambulatory to triage advising that he had stitches in his left index a couple of weeks ago. He advised he's been postponing the suture removal for the last week but they are starting to hurt so he wants them out. It appears that the skin has started to heal in the area around the stitches.        HISTORY OF PRESENT ILLNESS: 1 or more elements      History From: Patient  HPI Limitations: None     Michael Rojas is a 60 y.o. male with PMHx as noted below who presents to the ED today presents for suture removal of stitches placed 5/7/2025 in left index finger.  Patient should have had stitches removed a week ago, but states he was busy and was unable to.  Denies fevers, chills, nausea, vomiting.    See MDM Documentation for further HPI and PMHx      Nursing Notes were all reviewed and agreed with or any disagreements were addressed in the HPI.     REVIEW OF SYSTEMS      Review of Systems     Positives and Pertinent negatives as per HPI.    PAST HISTORY     Past Medical History:  Past Medical History:   Diagnosis Date    Cellulitis and abscess of trunk 7/31/2014    Chronic back pain greater than 3 months duration 7/31/2014    Diabetes (HCC)     Family history of prostate cancer 7/31/2014    Hyperhydrosis disorder 7/31/2014    Hypertension     Intention tremor 12/9/2015    Prediabetes 6/25/2014    RA (rheumatoid arthritis) ([facility]) 7/31/2014    Rash of perineum 6/25/2014    Tinea manus 1/19/2016    Tinea pedis of both feet 1/19/2016    Tobacco abuse 6/25/2014       Past Surgical History:  Past Surgical History:   Procedure Laterality Date